# Patient Record
Sex: MALE | Race: WHITE | Employment: UNEMPLOYED | URBAN - METROPOLITAN AREA
[De-identification: names, ages, dates, MRNs, and addresses within clinical notes are randomized per-mention and may not be internally consistent; named-entity substitution may affect disease eponyms.]

---

## 2021-02-17 ENCOUNTER — TRANSFERRED RECORDS (OUTPATIENT)
Dept: HEALTH INFORMATION MANAGEMENT | Facility: CLINIC | Age: 36
End: 2021-02-17

## 2021-02-23 LAB
CREAT SERPL-MCNC: 1.03 MG/DL (ref 0.7–1.2)
GFR SERPL CREATININE-BSD FRML MDRD: >60 ML/MIN/1.73M*2
GLUCOSE SERPL-MCNC: 137 MG/DL (ref 70–99)
INR PPP: 1.2 (ref 0.9–1.1)
POTASSIUM SERPL-SCNC: 3.3 MEQ/L (ref 3.4–5.1)

## 2021-02-24 LAB
CREAT SERPL-MCNC: 0.81 MG/DL (ref 0.7–1.2)
GFR SERPL CREATININE-BSD FRML MDRD: >60 ML/MIN/1.73M*2
GLUCOSE SERPL-MCNC: 134 MG/DL (ref 70–99)
POTASSIUM SERPL-SCNC: 3.1 MEQ/L (ref 3.4–5.1)

## 2021-02-25 LAB
CREAT SERPL-MCNC: 0.79 MG/DL (ref 0.7–1.2)
GFR SERPL CREATININE-BSD FRML MDRD: >60 ML/MIN/1.73M*2
GLUCOSE SERPL-MCNC: 133 MG/DL (ref 70–99)
POTASSIUM SERPL-SCNC: 3.7 MEQ/L (ref 3.4–5.1)

## 2021-02-26 ENCOUNTER — APPOINTMENT (OUTPATIENT)
Dept: GENERAL RADIOLOGY | Facility: CLINIC | Age: 36
End: 2021-02-26
Attending: STUDENT IN AN ORGANIZED HEALTH CARE EDUCATION/TRAINING PROGRAM
Payer: MEDICAID

## 2021-02-26 ENCOUNTER — APPOINTMENT (OUTPATIENT)
Dept: CARDIOLOGY | Facility: CLINIC | Age: 36
End: 2021-02-26
Attending: INTERNAL MEDICINE
Payer: MEDICAID

## 2021-02-26 ENCOUNTER — APPOINTMENT (OUTPATIENT)
Dept: GENERAL RADIOLOGY | Facility: CLINIC | Age: 36
End: 2021-02-26
Attending: INTERNAL MEDICINE
Payer: MEDICAID

## 2021-02-26 ENCOUNTER — APPOINTMENT (OUTPATIENT)
Dept: ULTRASOUND IMAGING | Facility: CLINIC | Age: 36
End: 2021-02-26
Attending: INTERNAL MEDICINE
Payer: MEDICAID

## 2021-02-26 ENCOUNTER — HOSPITAL ENCOUNTER (INPATIENT)
Facility: CLINIC | Age: 36
LOS: 12 days | Discharge: HOME HEALTH | End: 2021-03-10
Attending: INTERNAL MEDICINE | Admitting: INTERNAL MEDICINE
Payer: MEDICAID

## 2021-02-26 DIAGNOSIS — R57.0 CARDIOGENIC SHOCK (H): Primary | ICD-10-CM

## 2021-02-26 DIAGNOSIS — I51.3 LV (LEFT VENTRICULAR) MURAL THROMBUS: ICD-10-CM

## 2021-02-26 LAB
ALBUMIN SERPL-MCNC: 2.8 G/DL (ref 3.4–5)
ALBUMIN SERPL-MCNC: 3.1 G/DL (ref 3.4–5)
ALP SERPL-CCNC: 119 U/L (ref 40–150)
ALP SERPL-CCNC: 128 U/L (ref 40–150)
ALT SERPL W P-5'-P-CCNC: 19 U/L (ref 0–70)
ALT SERPL W P-5'-P-CCNC: 21 U/L (ref 0–70)
AMPHETAMINES UR QL SCN: NEGATIVE
ANION GAP SERPL CALCULATED.3IONS-SCNC: 6 MMOL/L (ref 3–14)
ANION GAP SERPL CALCULATED.3IONS-SCNC: ABNORMAL MMOL/L (ref 3–14)
APTT PPP: 33 SEC (ref 22–37)
AST SERPL W P-5'-P-CCNC: 29 U/L (ref 0–45)
AST SERPL W P-5'-P-CCNC: 31 U/L (ref 0–45)
BARBITURATES UR QL: NEGATIVE
BASE EXCESS BLDA CALC-SCNC: 16.1 MMOL/L
BASE EXCESS BLDA CALC-SCNC: 25.1 MMOL/L
BASE EXCESS BLDA CALC-SCNC: 25.1 MMOL/L
BASE EXCESS BLDA CALC-SCNC: 25.2 MMOL/L
BASE EXCESS BLDA CALC-SCNC: 25.3 MMOL/L
BASE EXCESS BLDV CALC-SCNC: 12.1 MMOL/L
BASE EXCESS BLDV CALC-SCNC: 25.1 MMOL/L
BASE EXCESS BLDV CALC-SCNC: 25.8 MMOL/L
BENZODIAZ UR QL: NEGATIVE
BILIRUB SERPL-MCNC: 3.7 MG/DL (ref 0.2–1.3)
BILIRUB SERPL-MCNC: 3.7 MG/DL (ref 0.2–1.3)
BUN SERPL-MCNC: 23 MG/DL (ref 7–30)
BUN SERPL-MCNC: 23 MG/DL (ref 7–30)
BUN SERPL-MCNC: 24 MG/DL (ref 7–30)
BUN SERPL-MCNC: 25 MG/DL (ref 7–30)
CA-I BLD-MCNC: 4.1 MG/DL (ref 4.4–5.2)
CALCIUM SERPL-MCNC: 8.2 MG/DL (ref 8.5–10.1)
CALCIUM SERPL-MCNC: 8.4 MG/DL (ref 8.5–10.1)
CALCIUM SERPL-MCNC: 8.8 MG/DL (ref 8.5–10.1)
CALCIUM SERPL-MCNC: 9.1 MG/DL (ref 8.5–10.1)
CANNABINOIDS UR QL SCN: POSITIVE
CHLORIDE SERPL-SCNC: 74 MMOL/L (ref 94–109)
CHLORIDE SERPL-SCNC: 77 MMOL/L (ref 94–109)
CHLORIDE SERPL-SCNC: 77 MMOL/L (ref 94–109)
CHLORIDE SERPL-SCNC: 79 MMOL/L (ref 94–109)
CO2 SERPL-SCNC: 39 MMOL/L (ref 20–32)
CO2 SERPL-SCNC: >45 MMOL/L (ref 20–32)
COCAINE UR QL: NEGATIVE
CREAT SERPL-MCNC: 0.68 MG/DL (ref 0.66–1.25)
CREAT SERPL-MCNC: 0.72 MG/DL (ref 0.66–1.25)
CREAT SERPL-MCNC: 0.85 MG/DL (ref 0.66–1.25)
CREAT SERPL-MCNC: 0.91 MG/DL (ref 0.66–1.25)
ERYTHROCYTE [DISTWIDTH] IN BLOOD BY AUTOMATED COUNT: 16.1 % (ref 10–15)
ERYTHROCYTE [DISTWIDTH] IN BLOOD BY AUTOMATED COUNT: 16.2 % (ref 10–15)
ERYTHROCYTE [DISTWIDTH] IN BLOOD BY AUTOMATED COUNT: 16.3 % (ref 10–15)
ETHANOL UR QL SCN: NEGATIVE
GFR SERPL CREATININE-BSD FRML MDRD: >90 ML/MIN/{1.73_M2}
GLUCOSE BLDC GLUCOMTR-MCNC: 151 MG/DL (ref 70–99)
GLUCOSE BLDC GLUCOMTR-MCNC: 166 MG/DL (ref 70–99)
GLUCOSE SERPL-MCNC: 110 MG/DL (ref 70–99)
GLUCOSE SERPL-MCNC: 123 MG/DL (ref 70–99)
GLUCOSE SERPL-MCNC: 131 MG/DL (ref 70–99)
GLUCOSE SERPL-MCNC: 153 MG/DL (ref 70–99)
HCO3 BLD-SCNC: 41 MMOL/L (ref 21–28)
HCO3 BLD-SCNC: 51 MMOL/L (ref 21–28)
HCO3 BLD-SCNC: 52 MMOL/L (ref 21–28)
HCO3 BLDV-SCNC: 37 MMOL/L (ref 21–28)
HCO3 BLDV-SCNC: 52 MMOL/L (ref 21–28)
HCO3 BLDV-SCNC: 53 MMOL/L (ref 21–28)
HCT VFR BLD AUTO: 33.8 % (ref 40–53)
HCT VFR BLD AUTO: 34.7 % (ref 40–53)
HCT VFR BLD AUTO: 35.8 % (ref 40–53)
HGB BLD-MCNC: 11.1 G/DL (ref 13.3–17.7)
HGB BLD-MCNC: 11.5 G/DL (ref 13.3–17.7)
HGB BLD-MCNC: 11.6 G/DL (ref 13.3–17.7)
HGB BLD-MCNC: 12 G/DL (ref 13.3–17.7)
INR PPP: 1.16 (ref 0.86–1.14)
INTERPRETATION ECG - MUSE: NORMAL
LABORATORY COMMENT REPORT: NORMAL
LACTATE BLD-SCNC: 1.3 MMOL/L (ref 0.7–2)
MAGNESIUM SERPL-MCNC: 2.4 MG/DL (ref 1.6–2.3)
MAGNESIUM SERPL-MCNC: 2.4 MG/DL (ref 1.6–2.3)
MAGNESIUM SERPL-MCNC: 2.5 MG/DL (ref 1.6–2.3)
MCH RBC QN AUTO: 31.6 PG (ref 26.5–33)
MCH RBC QN AUTO: 32.5 PG (ref 26.5–33)
MCH RBC QN AUTO: 32.5 PG (ref 26.5–33)
MCHC RBC AUTO-ENTMCNC: 32.8 G/DL (ref 31.5–36.5)
MCHC RBC AUTO-ENTMCNC: 33.4 G/DL (ref 31.5–36.5)
MCHC RBC AUTO-ENTMCNC: 33.5 G/DL (ref 31.5–36.5)
MCV RBC AUTO: 96 FL (ref 78–100)
MCV RBC AUTO: 97 FL (ref 78–100)
MCV RBC AUTO: 97 FL (ref 78–100)
O2/TOTAL GAS SETTING VFR VENT: ABNORMAL %
OPIATES UR QL SCN: POSITIVE
OXYHGB MFR BLD: 69 % (ref 92–100)
OXYHGB MFR BLD: 95 % (ref 92–100)
OXYHGB MFR BLDV: 65 %
OXYHGB MFR BLDV: 73 %
OXYHGB MFR BLDV: 75 %
PCO2 BLD: 52 MM HG (ref 35–45)
PCO2 BLD: 56 MM HG (ref 35–45)
PCO2 BLD: 59 MM HG (ref 35–45)
PCO2 BLD: 59 MM HG (ref 35–45)
PCO2 BLD: 60 MM HG (ref 35–45)
PCO2 BLDV: 51 MM HG (ref 40–50)
PCO2 BLDV: 64 MM HG (ref 40–50)
PCO2 BLDV: 65 MM HG (ref 40–50)
PCP UR QL SCN: NEGATIVE
PH BLD: 7.51 PH (ref 7.35–7.45)
PH BLD: 7.55 PH (ref 7.35–7.45)
PH BLD: 7.57 PH (ref 7.35–7.45)
PH BLDV: 7.48 PH (ref 7.32–7.43)
PH BLDV: 7.51 PH (ref 7.32–7.43)
PH BLDV: 7.53 PH (ref 7.32–7.43)
PHOSPHATE SERPL-MCNC: 3.4 MG/DL (ref 2.5–4.5)
PLATELET # BLD AUTO: 147 10E9/L (ref 150–450)
PLATELET # BLD AUTO: 149 10E9/L (ref 150–450)
PLATELET # BLD AUTO: 150 10E9/L (ref 150–450)
PO2 BLD: 132 MM HG (ref 80–105)
PO2 BLD: 79 MM HG (ref 80–105)
PO2 BLD: 83 MM HG (ref 80–105)
PO2 BLD: 83 MM HG (ref 80–105)
PO2 BLD: 92 MM HG (ref 80–105)
PO2 BLDV: 36 MM HG (ref 25–47)
PO2 BLDV: 41 MM HG (ref 25–47)
PO2 BLDV: 43 MM HG (ref 25–47)
POTASSIUM SERPL-SCNC: 2.8 MMOL/L (ref 3.4–5.3)
POTASSIUM SERPL-SCNC: 3.2 MMOL/L (ref 3.4–5.3)
POTASSIUM SERPL-SCNC: 3.4 MMOL/L (ref 3.4–5.3)
POTASSIUM SERPL-SCNC: 3.4 MMOL/L (ref 3.4–5.3)
POTASSIUM SERPL-SCNC: 3.5 MMOL/L (ref 3.4–5.3)
PROT SERPL-MCNC: 5.4 G/DL (ref 6.8–8.8)
PROT SERPL-MCNC: 5.8 G/DL (ref 6.8–8.8)
RBC # BLD AUTO: 3.51 10E12/L (ref 4.4–5.9)
RBC # BLD AUTO: 3.57 10E12/L (ref 4.4–5.9)
RBC # BLD AUTO: 3.69 10E12/L (ref 4.4–5.9)
SARS-COV-2 RNA RESP QL NAA+PROBE: NEGATIVE
SODIUM SERPL-SCNC: 124 MMOL/L (ref 133–144)
SODIUM SERPL-SCNC: 126 MMOL/L (ref 133–144)
SODIUM SERPL-SCNC: 128 MMOL/L (ref 133–144)
SODIUM SERPL-SCNC: 129 MMOL/L (ref 133–144)
SPECIMEN SOURCE: NORMAL
T4 FREE SERPL-MCNC: 1.04 NG/DL (ref 0.76–1.46)
TSH SERPL DL<=0.005 MIU/L-ACNC: 14.24 MU/L (ref 0.4–4)
UFH PPP CHRO-ACNC: 0.29 IU/ML
WBC # BLD AUTO: 6.6 10E9/L (ref 4–11)
WBC # BLD AUTO: 6.8 10E9/L (ref 4–11)
WBC # BLD AUTO: 7 10E9/L (ref 4–11)

## 2021-02-26 PROCEDURE — 84443 ASSAY THYROID STIM HORMONE: CPT | Performed by: INTERNAL MEDICINE

## 2021-02-26 PROCEDURE — 250N000013 HC RX MED GY IP 250 OP 250 PS 637: Performed by: INTERNAL MEDICINE

## 2021-02-26 PROCEDURE — 999N000065 XR CHEST PORT 1 VW

## 2021-02-26 PROCEDURE — 71045 X-RAY EXAM CHEST 1 VIEW: CPT | Mod: 26 | Performed by: RADIOLOGY

## 2021-02-26 PROCEDURE — 250N000011 HC RX IP 250 OP 636: Performed by: STUDENT IN AN ORGANIZED HEALTH CARE EDUCATION/TRAINING PROGRAM

## 2021-02-26 PROCEDURE — 250N000011 HC RX IP 250 OP 636: Performed by: INTERNAL MEDICINE

## 2021-02-26 PROCEDURE — 02HP32Z INSERTION OF MONITORING DEVICE INTO PULMONARY TRUNK, PERCUTANEOUS APPROACH: ICD-10-PCS | Performed by: INTERNAL MEDICINE

## 2021-02-26 PROCEDURE — 93260 PRGRMG DEV EVAL IMPLTBL SYS: CPT | Mod: 26 | Performed by: INTERNAL MEDICINE

## 2021-02-26 PROCEDURE — 258N000003 HC RX IP 258 OP 636

## 2021-02-26 PROCEDURE — 93005 ELECTROCARDIOGRAM TRACING: CPT

## 2021-02-26 PROCEDURE — 82810 BLOOD GASES O2 SAT ONLY: CPT

## 2021-02-26 PROCEDURE — 4A023N6 MEASUREMENT OF CARDIAC SAMPLING AND PRESSURE, RIGHT HEART, PERCUTANEOUS APPROACH: ICD-10-PCS | Performed by: INTERNAL MEDICINE

## 2021-02-26 PROCEDURE — 999N000208 ECHOCARDIOGRAM COMPLETE

## 2021-02-26 PROCEDURE — 93010 ELECTROCARDIOGRAM REPORT: CPT | Performed by: INTERNAL MEDICINE

## 2021-02-26 PROCEDURE — 99292 CRITICAL CARE ADDL 30 MIN: CPT | Mod: 25 | Performed by: INTERNAL MEDICINE

## 2021-02-26 PROCEDURE — 250N000009 HC RX 250: Performed by: INTERNAL MEDICINE

## 2021-02-26 PROCEDURE — 85520 HEPARIN ASSAY: CPT | Performed by: INTERNAL MEDICINE

## 2021-02-26 PROCEDURE — 99223 1ST HOSP IP/OBS HIGH 75: CPT | Mod: GC | Performed by: INTERNAL MEDICINE

## 2021-02-26 PROCEDURE — 82803 BLOOD GASES ANY COMBINATION: CPT | Performed by: INTERNAL MEDICINE

## 2021-02-26 PROCEDURE — 85610 PROTHROMBIN TIME: CPT | Performed by: INTERNAL MEDICINE

## 2021-02-26 PROCEDURE — 85027 COMPLETE CBC AUTOMATED: CPT | Performed by: STUDENT IN AN ORGANIZED HEALTH CARE EDUCATION/TRAINING PROGRAM

## 2021-02-26 PROCEDURE — 84100 ASSAY OF PHOSPHORUS: CPT | Performed by: INTERNAL MEDICINE

## 2021-02-26 PROCEDURE — 3E043XZ INTRODUCTION OF VASOPRESSOR INTO CENTRAL VEIN, PERCUTANEOUS APPROACH: ICD-10-PCS | Performed by: INTERNAL MEDICINE

## 2021-02-26 PROCEDURE — 83735 ASSAY OF MAGNESIUM: CPT | Performed by: INTERNAL MEDICINE

## 2021-02-26 PROCEDURE — 999N001017 HC STATISTIC GLUCOSE BY METER IP

## 2021-02-26 PROCEDURE — 80307 DRUG TEST PRSMV CHEM ANLYZR: CPT | Performed by: INTERNAL MEDICINE

## 2021-02-26 PROCEDURE — 250N000011 HC RX IP 250 OP 636

## 2021-02-26 PROCEDURE — 80323 ALKALOIDS NOS: CPT | Performed by: STUDENT IN AN ORGANIZED HEALTH CARE EDUCATION/TRAINING PROGRAM

## 2021-02-26 PROCEDURE — 4A133B3 MONITORING OF ARTERIAL PRESSURE, PULMONARY, PERCUTANEOUS APPROACH: ICD-10-PCS | Performed by: INTERNAL MEDICINE

## 2021-02-26 PROCEDURE — 82330 ASSAY OF CALCIUM: CPT | Performed by: STUDENT IN AN ORGANIZED HEALTH CARE EDUCATION/TRAINING PROGRAM

## 2021-02-26 PROCEDURE — 93451 RIGHT HEART CATH: CPT | Performed by: INTERNAL MEDICINE

## 2021-02-26 PROCEDURE — 80053 COMPREHEN METABOLIC PANEL: CPT | Performed by: INTERNAL MEDICINE

## 2021-02-26 PROCEDURE — 200N000002 HC R&B ICU UMMC

## 2021-02-26 PROCEDURE — 250N000013 HC RX MED GY IP 250 OP 250 PS 637: Performed by: STUDENT IN AN ORGANIZED HEALTH CARE EDUCATION/TRAINING PROGRAM

## 2021-02-26 PROCEDURE — U0003 INFECTIOUS AGENT DETECTION BY NUCLEIC ACID (DNA OR RNA); SEVERE ACUTE RESPIRATORY SYNDROME CORONAVIRUS 2 (SARS-COV-2) (CORONAVIRUS DISEASE [COVID-19]), AMPLIFIED PROBE TECHNIQUE, MAKING USE OF HIGH THROUGHPUT TECHNOLOGIES AS DESCRIBED BY CMS-2020-01-R: HCPCS | Performed by: INTERNAL MEDICINE

## 2021-02-26 PROCEDURE — 80048 BASIC METABOLIC PNL TOTAL CA: CPT

## 2021-02-26 PROCEDURE — 71045 X-RAY EXAM CHEST 1 VIEW: CPT

## 2021-02-26 PROCEDURE — 80048 BASIC METABOLIC PNL TOTAL CA: CPT | Performed by: INTERNAL MEDICINE

## 2021-02-26 PROCEDURE — 255N000002 HC RX 255 OP 636: Performed by: INTERNAL MEDICINE

## 2021-02-26 PROCEDURE — 93975 VASCULAR STUDY: CPT

## 2021-02-26 PROCEDURE — 93975 VASCULAR STUDY: CPT | Mod: 26 | Performed by: RADIOLOGY

## 2021-02-26 PROCEDURE — 93260 PRGRMG DEV EVAL IMPLTBL SYS: CPT

## 2021-02-26 PROCEDURE — 85027 COMPLETE CBC AUTOMATED: CPT | Performed by: INTERNAL MEDICINE

## 2021-02-26 PROCEDURE — 76376 3D RENDER W/INTRP POSTPROCES: CPT | Mod: 26 | Performed by: INTERNAL MEDICINE

## 2021-02-26 PROCEDURE — 85018 HEMOGLOBIN: CPT

## 2021-02-26 PROCEDURE — 99291 CRITICAL CARE FIRST HOUR: CPT | Mod: 25 | Performed by: INTERNAL MEDICINE

## 2021-02-26 PROCEDURE — 80321 ALCOHOLS BIOMARKERS 1OR 2: CPT | Performed by: INTERNAL MEDICINE

## 2021-02-26 PROCEDURE — 250N000009 HC RX 250: Performed by: STUDENT IN AN ORGANIZED HEALTH CARE EDUCATION/TRAINING PROGRAM

## 2021-02-26 PROCEDURE — U0005 INFEC AGEN DETEC AMPLI PROBE: HCPCS | Performed by: INTERNAL MEDICINE

## 2021-02-26 PROCEDURE — 250N000013 HC RX MED GY IP 250 OP 250 PS 637

## 2021-02-26 PROCEDURE — 4A1239Z MONITORING OF CARDIAC OUTPUT, PERCUTANEOUS APPROACH: ICD-10-PCS | Performed by: INTERNAL MEDICINE

## 2021-02-26 PROCEDURE — 82805 BLOOD GASES W/O2 SATURATION: CPT | Performed by: INTERNAL MEDICINE

## 2021-02-26 PROCEDURE — 84132 ASSAY OF SERUM POTASSIUM: CPT | Performed by: INTERNAL MEDICINE

## 2021-02-26 PROCEDURE — 272N000001 HC OR GENERAL SUPPLY STERILE: Performed by: INTERNAL MEDICINE

## 2021-02-26 PROCEDURE — 83605 ASSAY OF LACTIC ACID: CPT | Performed by: INTERNAL MEDICINE

## 2021-02-26 PROCEDURE — 85730 THROMBOPLASTIN TIME PARTIAL: CPT | Performed by: STUDENT IN AN ORGANIZED HEALTH CARE EDUCATION/TRAINING PROGRAM

## 2021-02-26 PROCEDURE — 80320 DRUG SCREEN QUANTALCOHOLS: CPT | Performed by: INTERNAL MEDICINE

## 2021-02-26 PROCEDURE — 93306 TTE W/DOPPLER COMPLETE: CPT | Mod: 26 | Performed by: INTERNAL MEDICINE

## 2021-02-26 PROCEDURE — 258N000003 HC RX IP 258 OP 636: Performed by: STUDENT IN AN ORGANIZED HEALTH CARE EDUCATION/TRAINING PROGRAM

## 2021-02-26 PROCEDURE — 84439 ASSAY OF FREE THYROXINE: CPT | Performed by: INTERNAL MEDICINE

## 2021-02-26 PROCEDURE — G0463 HOSPITAL OUTPT CLINIC VISIT: HCPCS

## 2021-02-26 PROCEDURE — 82803 BLOOD GASES ANY COMBINATION: CPT

## 2021-02-26 RX ORDER — POTASSIUM CHLORIDE 29.8 MG/ML
20 INJECTION INTRAVENOUS ONCE
Status: COMPLETED | OUTPATIENT
Start: 2021-02-27 | End: 2021-02-27

## 2021-02-26 RX ORDER — POTASSIUM CHLORIDE 7.45 MG/ML
10 INJECTION INTRAVENOUS ONCE
Status: COMPLETED | OUTPATIENT
Start: 2021-02-26 | End: 2021-02-26

## 2021-02-26 RX ORDER — LIDOCAINE 40 MG/G
CREAM TOPICAL
Status: DISCONTINUED | OUTPATIENT
Start: 2021-02-26 | End: 2021-03-10 | Stop reason: HOSPADM

## 2021-02-26 RX ORDER — DOBUTAMINE HYDROCHLORIDE 200 MG/100ML
2.5-2 INJECTION INTRAVENOUS CONTINUOUS
Status: DISCONTINUED | OUTPATIENT
Start: 2021-02-26 | End: 2021-02-26 | Stop reason: DRUGHIGH

## 2021-02-26 RX ORDER — POTASSIUM CHLORIDE 20MEQ/15ML
40 LIQUID (ML) ORAL ONCE
Status: COMPLETED | OUTPATIENT
Start: 2021-02-26 | End: 2021-02-26

## 2021-02-26 RX ORDER — DOBUTAMINE HYDROCHLORIDE 200 MG/100ML
1 INJECTION INTRAVENOUS CONTINUOUS
Status: DISCONTINUED | OUTPATIENT
Start: 2021-02-26 | End: 2021-02-28

## 2021-02-26 RX ORDER — POTASSIUM CHLORIDE 20MEQ/15ML
30 LIQUID (ML) ORAL ONCE
Status: COMPLETED | OUTPATIENT
Start: 2021-02-26 | End: 2021-02-26

## 2021-02-26 RX ORDER — OXYCODONE HYDROCHLORIDE 5 MG/1
5 TABLET ORAL
Status: COMPLETED | OUTPATIENT
Start: 2021-02-26 | End: 2021-02-26

## 2021-02-26 RX ORDER — ACETAMINOPHEN 325 MG/1
650 TABLET ORAL EVERY 4 HOURS PRN
Status: DISCONTINUED | OUTPATIENT
Start: 2021-02-26 | End: 2021-03-10 | Stop reason: HOSPADM

## 2021-02-26 RX ORDER — NOREPINEPHRINE BITARTRATE 0.06 MG/ML
0.03-0.4 INJECTION, SOLUTION INTRAVENOUS CONTINUOUS
Status: DISCONTINUED | OUTPATIENT
Start: 2021-02-26 | End: 2021-03-01

## 2021-02-26 RX ORDER — MULTIPLE VITAMINS W/ MINERALS TAB 9MG-400MCG
1 TAB ORAL DAILY
Status: DISCONTINUED | OUTPATIENT
Start: 2021-02-26 | End: 2021-03-10 | Stop reason: HOSPADM

## 2021-02-26 RX ORDER — ACETAMINOPHEN 650 MG/1
650 SUPPOSITORY RECTAL EVERY 4 HOURS PRN
Status: DISCONTINUED | OUTPATIENT
Start: 2021-02-26 | End: 2021-03-10 | Stop reason: HOSPADM

## 2021-02-26 RX ORDER — NOREPINEPHRINE BITARTRATE 0.06 MG/ML
0.03-0.4 INJECTION, SOLUTION INTRAVENOUS CONTINUOUS
Status: DISCONTINUED | OUTPATIENT
Start: 2021-02-26 | End: 2021-02-26 | Stop reason: DRUGHIGH

## 2021-02-26 RX ORDER — FOLIC ACID 1 MG/1
1 TABLET ORAL DAILY
Status: DISCONTINUED | OUTPATIENT
Start: 2021-02-26 | End: 2021-03-10 | Stop reason: HOSPADM

## 2021-02-26 RX ORDER — MAGNESIUM HYDROXIDE/ALUMINUM HYDROXICE/SIMETHICONE 120; 1200; 1200 MG/30ML; MG/30ML; MG/30ML
30 SUSPENSION ORAL EVERY 4 HOURS PRN
Status: DISCONTINUED | OUTPATIENT
Start: 2021-02-26 | End: 2021-03-10 | Stop reason: HOSPADM

## 2021-02-26 RX ORDER — NITROGLYCERIN 0.4 MG/1
0.4 TABLET SUBLINGUAL EVERY 5 MIN PRN
Status: DISCONTINUED | OUTPATIENT
Start: 2021-02-26 | End: 2021-03-10 | Stop reason: HOSPADM

## 2021-02-26 RX ORDER — LANOLIN ALCOHOL/MO/W.PET/CERES
100 CREAM (GRAM) TOPICAL DAILY
Status: DISCONTINUED | OUTPATIENT
Start: 2021-02-26 | End: 2021-03-10 | Stop reason: HOSPADM

## 2021-02-26 RX ORDER — ACETAZOLAMIDE 500 MG/5ML
500 INJECTION, POWDER, LYOPHILIZED, FOR SOLUTION INTRAVENOUS EVERY 12 HOURS
Status: COMPLETED | OUTPATIENT
Start: 2021-02-26 | End: 2021-02-27

## 2021-02-26 RX ORDER — POTASSIUM CHLORIDE 750 MG/1
40 TABLET, EXTENDED RELEASE ORAL ONCE
Status: COMPLETED | OUTPATIENT
Start: 2021-02-26 | End: 2021-02-26

## 2021-02-26 RX ORDER — HEPARIN SODIUM 10000 [USP'U]/100ML
0-5000 INJECTION, SOLUTION INTRAVENOUS CONTINUOUS
Status: DISCONTINUED | OUTPATIENT
Start: 2021-02-26 | End: 2021-02-28

## 2021-02-26 RX ADMIN — MULTIPLE VITAMINS W/ MINERALS TAB 1 TABLET: TAB at 16:55

## 2021-02-26 RX ADMIN — POTASSIUM CHLORIDE 10 MEQ: 7.46 INJECTION, SOLUTION INTRAVENOUS at 11:22

## 2021-02-26 RX ADMIN — NICOTINE 7 MG/24 HR DAILY TRANSDERMAL PATCH 1 PATCH: at 13:10

## 2021-02-26 RX ADMIN — POTASSIUM CHLORIDE 20 MEQ: 29.8 INJECTION, SOLUTION INTRAVENOUS at 23:56

## 2021-02-26 RX ADMIN — HEPARIN SODIUM 950 UNITS/HR: 10000 INJECTION, SOLUTION INTRAVENOUS at 16:59

## 2021-02-26 RX ADMIN — Medication 0.04 MCG/KG/MIN: at 00:10

## 2021-02-26 RX ADMIN — OXYCODONE HYDROCHLORIDE 5 MG: 5 TABLET ORAL at 02:51

## 2021-02-26 RX ADMIN — ACETAZOLAMIDE SODIUM 500 MG: 500 INJECTION, POWDER, LYOPHILIZED, FOR SOLUTION INTRAVENOUS at 11:22

## 2021-02-26 RX ADMIN — DOBUTAMINE HYDROCHLORIDE 3 MCG/KG/MIN: 200 INJECTION INTRAVENOUS at 00:51

## 2021-02-26 RX ADMIN — Medication 2.4 UNITS/HR: at 00:12

## 2021-02-26 RX ADMIN — POTASSIUM CHLORIDE 40 MEQ: 40 SOLUTION ORAL at 02:51

## 2021-02-26 RX ADMIN — FOLIC ACID 1 MG: 1 TABLET ORAL at 16:55

## 2021-02-26 RX ADMIN — ACETAMINOPHEN 650 MG: 325 TABLET, FILM COATED ORAL at 00:43

## 2021-02-26 RX ADMIN — Medication 5 MG/HR: at 00:50

## 2021-02-26 RX ADMIN — HUMAN ALBUMIN MICROSPHERES AND PERFLUTREN 4 ML: 10; .22 INJECTION, SOLUTION INTRAVENOUS at 08:47

## 2021-02-26 RX ADMIN — POTASSIUM CHLORIDE 40 MEQ: 750 TABLET, EXTENDED RELEASE ORAL at 18:55

## 2021-02-26 RX ADMIN — Medication 2.4 UNITS/HR: at 09:30

## 2021-02-26 RX ADMIN — POTASSIUM CHLORIDE 30 MEQ: 40 SOLUTION ORAL at 20:06

## 2021-02-26 RX ADMIN — OXYCODONE HYDROCHLORIDE 5 MG: 5 TABLET ORAL at 22:54

## 2021-02-26 RX ADMIN — CALCIUM GLUCONATE 1 G: 98 INJECTION, SOLUTION INTRAVENOUS at 13:37

## 2021-02-26 RX ADMIN — THIAMINE HCL TAB 100 MG 100 MG: 100 TAB at 16:55

## 2021-02-26 RX ADMIN — ACETAMINOPHEN 650 MG: 325 TABLET, FILM COATED ORAL at 22:54

## 2021-02-26 RX ADMIN — DOBUTAMINE HYDROCHLORIDE 3 MCG/KG/MIN: 200 INJECTION INTRAVENOUS at 00:12

## 2021-02-26 ASSESSMENT — ACTIVITIES OF DAILY LIVING (ADL)
ADLS_ACUITY_SCORE: 15
ADLS_ACUITY_SCORE: 15
ADLS_ACUITY_SCORE: 17
ADLS_ACUITY_SCORE: 15
ADLS_ACUITY_SCORE: 17

## 2021-02-26 ASSESSMENT — MIFFLIN-ST. JEOR: SCORE: 1446.25

## 2021-02-26 NOTE — PROGRESS NOTES
[facility]    Cardiology Progress Note- Cards 2        Date of Admission:  2/26/2021     Assessment & Plan: HVSL   Miguel Ángel Henson is a 35 year old male with a PMHx of NICM (LVEF 15%) 2/2 to EtOH abuse, subcutaneous ICD with hx of VT and tobacco abuse who presented to OSH 2/17/20 profoundly volume overloaded with acute on chronic HFrEF, cardiogenic shock and AC requiring transfer to [facility] 2/26/2021 for consideration of advanced therapies.     New Today:  - RHC  - start heparin gtt for LV apical thrombus  - wean vasopressors as tolerated  - start acetazolamide for metabolic alkalosis  - f/u liver unit(s)/s  - neuropsych evaluation  - obtain financial clearance for advanced therapies evaluation     ---Neuro---  No acute issues      ---Cardiovascular---  # Dilated NICM (LVEF 15%, LVIDd 7.7 cm) 2/2 to EtOH   # Acute on chronic HFrEF, NYHA class IV   # Cardiogenic shock   # Medication/clinic follow-up non compliance   Presented to OSH with marked volume overload and cardiogenic shock requiring multiple pressors. Decompensated HF is in setting of medication non-compliance. On admit to [facility], CI is 2.2 (on dobutamine 3 mcg/kg/min, NE 0.06 and vaso 2.4) with CVP of 8 (small collapsible IVC) after significant volume removal (70 lbs) at OSH. Consideration of advanced therapies likely limited by current tobacco use, EtOH abuse, and medication/follow-up noncompliance.   -continue dobutamine 3 mcg/kg/min   -wean NE and vasopressin as able  -hold diuretics for now (was on lasix gtt 5 mg/hr)   -RHC today  -TTE today  -PEt in process  - neuropsych evaluation  - obtain financial approval for advanced therapies evalulation   -cirrhosis w/u as below     # LV apical thrombus  - start heparin gtt     # Subcutaneous ICD with hx of VT  -ICD check ordered      ---Respiratory---  # Acute hypoxic respiratory failure 2/2 to CHF/atelectasis, improved   -supplemental O2 as needed to maintain  SpO2>92%     # Tobacco abuse  -strict cessation encouraged      ---Renal---  # AC, resolved, 2/2 to cardiorenal   Cr 1.5 on admit to OSH, 0.9 on admit to [facility].  -trend renal fxn and UOP      # Hyponatremia, improving, 2/2 to volume overload   -trend BMP      # Primary metabolic alkalosis, suspect 2/2 to significant diuresis     -start acetazolamide  -trend BMP/VBG     # Diuretic induced hypokalemia  - replacement per unit protocol     ---GI---  # Portal hypertension   # Ascites s/p multiple paracentesis   # Hx alcohol abuse with alcoholic pancreatis/hepatitis    No cirrhosis per reports on outside imaging. Ascites felt to be 2/2 to CHF as above. As status of liver will impact advanced therapies discussion, will get formal abdominal US.  -abdominal US with dopplers   -PEth as above   -trend LFTs  MELD-Na score: 20 at 2/26/2021  9:25 AM  MELD score: 13 at 2/26/2021  9:25 AM  Calculated from:  Serum Creatinine: 0.85 mg/dL (Rounded to 1 mg/dL) at 2/26/2021  9:25 AM  Serum Sodium: 129 mmol/L at 2/26/2021  9:25 AM  Total Bilirubin: 3.7 mg/dL at 2/26/2021  3:37 AM  INR(ratio): 1.16 at 2/26/2021 12:30 AM  Age: 35 years 3 months       ---ID---  No acute issues, no growth on cx from paracentesis   -covid negative   -repeat infectious workup if unable to wean vasopressors     ---Endocrine--   No acute issues     ---Hematology---  # Normocytic Anemia/ ACD  - trend CBC daily     FEN: NPO for RHC   DVT Prophylaxis: Heparin gtt  Code Status: Full     Entered: Renzo Dorman MD 02/26/2021, 7:02 AM       The patient's care was discussed with the Attending Physician, Dr. Pratt.    Renzo Dorman MD   Cardiology Fellow  [facility] [id number]  [facility]  Please see sign in/sign out for up to date coverage information  ______________________________________________________________________    Interval History   Patient admitted overnight as transfer with cardiogenic shock 2/2 NICM and volume  overload. Overnight diuretics were held given CVP of 6 and metabolic alkalosis. Patient remains on dobutamine, norepinephrine, and vasopressin. This morning his only complaint is thirst. Denies shortness of breath. Denies chest pain or palpitations. Denies lightheadedness or dizziness. Denies n/v/d. Denies fevers,, chills, or night sweats.    Data reviewed today: I reviewed all medications, new labs and imaging results over the last 24 hours.    Physical Exam   Vital Signs: Temp: 98.7  F (37.1  C) Temp src: Oral   Pulse: 97   Resp: 16 SpO2: 96 % O2 Device: Nasal cannula Oxygen Delivery: 2 LPM  Weight: 113 lbs 8.59 oz  Constitutional: awake, alert, cooperative, no apparent distress, cachectic in appearance  Eyes: Nonicteric, pupils equal  HENT: NCAT, temporal wasting  Respiratory: Nonlabored,  Decreased air movement over the b/l bases, no crackles or wheezess  Cardiovascular: Regular tachycardia,  III/VI Holosystolic blowing murmur   Best appreciated over apex, parasternal heave, laterally displaced PMI  GI: Distended abdomen, + fluid wave, tympanitic to percussion, NT  Skin: warm and dry, no rash  Musculoskeletal: thin extremities, wwp, no peripheral edema  Neurologic: AAOx3, CN grossly intact, spontaneous movement of all extremities    Data   Recent Labs   Lab 02/26/21  0337 02/26/21  0030   WBC 7.0 6.6   HGB 11.1* 12.0*   MCV 96 97   * 150   INR  --  1.16*   * 128*   POTASSIUM 3.4 3.4   CHLORIDE 74* 77*   CO2 >45* >45*   BUN 23 23   CR 0.68 0.91   ANIONGAP Not Calculated Not Calculated   ALEKSEY 8.4* 9.1   * 131*   ALBUMIN 2.8* 3.1*   PROTTOTAL 5.4* 5.8*   BILITOTAL 3.7* 3.7*   ALKPHOS 119 128   ALT 21 19   AST 29 31     Recent Results (from the past 24 hour(s))   XR Chest Port 1 View    Narrative    EXAM: XR CHEST PORT 1 VW  2/26/2021 1:55 AM     HISTORY:  heart failure, intubated       COMPARISON:  None    FINDINGS:   Frontal radiograph of the chest. Right upper extremity PICC tip  projects over  the low SVC. Single lead subcutaneous left chest wall  implanted cardioverter defibrillator. The cardiac silhouette is  enlarged. Pulmonary venous congestion. Mild prominent interstitial  opacities. No pneumothorax. Trace bilateral pleural effusions. The  upper abdomen is unremarkable. No acute osseous abnormalities.      Impression    IMPRESSION:   1. Endotracheal tube is not visualized in the thoracic trachea.  2. Cardiomegaly with pulmonary venous congestion and mild prominent  interstitial opacities, likely representing pulmonary edema.  3. Trace bilateral pleural effusions.     Medications     DOBUTamine 3 mcg/kg/min (02/26/21 0800)     norepinephrine 0.06 mcg/kg/min (02/26/21 0800)     - MEDICATION INSTRUCTIONS -       vasopressin 2.4 Units/hr (02/26/21 0800)       sodium chloride (PF)  10 mL Intravenous Once

## 2021-02-26 NOTE — PLAN OF CARE
Major shift events: Patient arrived to  from [facility] at midnight. Belongings kept with patient. Patient at home meds kept in room and to be taken home with Teresa, patient's significant other, when she arrives today. 2 RN skin assessment completed by WILDER Garcia RN and MARY KATE Felix RN. Wound consult placed for coccyx and right dorsal foot wounds.     Patient A/Ox4 breathing comfortably on 2L NC. Denies SOB. On vaso, dobutamine, and titrating levo to maintain MAP > 65. HR ST. CVP 8. EP consulted for ICD device check. NPO since 0200 for abdominal US, but tolerates liquids and meds. Patient notes poor appetite for the past couple weeks. Hanson in place with adequate urine output. Lasix gtt stopped d/t over diuresing. No BM, + BS. Abdomen firm and distended. Mild back pain controlled with tylenol and oxy. R dorsal foot wound and coccyx wound dressed w/ foam dressings and WOC consult placed. K+ replaced x1 and trend with further AM labs. Plan for potential RHC and swan placement today per cardiology. Continue to monitor patient and update team with further changes. See flowsheet for details and assessment.

## 2021-02-26 NOTE — PROGRESS NOTES
Reviewed RN consult for suspected pressure injury from OSH from foot and coccyx.   RN may enter consult for hospital acquired pressure injury only.  Provider order required for WOC service.   WOC RN requested bedside RN obtain provider order/consult.   WOC consult completed by WOC- RN, await provider consult if services desired.    Sylwia Mirza, RN BSN CWOCN

## 2021-02-26 NOTE — PROGRESS NOTES
"CLINICAL NUTRITION SERVICES - ASSESSMENT NOTE     Nutrition Prescription    Recommendations:  Advance to regular diet as appropriate. Suspect patient will not need low-sodium diet restrictions given low appetite (can always restrict in the future if his appetite improves).     Malnutrition Status:    Severe malnutrition in the context of acute on chronic illness    Interventions ordered by Registered Dietitian (RD):  - When diet is ordered post-procedure, Ensure Enlive (350 kcal, 20 g protein ea) will be sent TID between meals.   - Multivitamin w/ minerals (1 tablet), thiamine (100 mg) and folic acid (1 mg) daily given h/o ETOH dependence and possible micronutrient deficiencies.     Future Recommendations:  If enteral nutrition indicated this admit, would recommend: Nutren 1.5 @ 50 mL/hr + Prosource (1 pkt BID) to provide 1880 kcals (36 kcal/kg/day), 104 g PRO (2 g/kg/day), 912 mL H2O, 211 g CHO and no fiber daily.       REASON FOR ASSESSMENT  Miguel Ángel Henson is a 35 year old male assessed by the dietitian for Provider Order - \"Severe Malnutrition\"  PMH includes NICM (LVEF 15%) secondary to ETOH dependence (6 beers/day multiple days of the week, reported last drink was 2 months ago), subcutaneous ICD w/ hx of VT and active tobacco use.    NUTRITION HISTORY  Patient reports following a regular diet at home. Typically consumes 3 meals/day (oatmeal or yogurt/fruit smoothie for breakfast; sandwich for lunch; protein/starch/vegetable for dinner). Patient reports usual body weight of 150-160 lbs, though he doesn't regularly weigh himself. Given severe fat and muscle loss, suspect ETOH consumption has chronically displaced nutrient-dense intake.    Patient reports reduced appetite since admission at OSH on 2/17 (x9 days). He attributes reduced appetite to fluid restrictions at OSH, stating he \"always drinks 16 oz of fluid\" with meals, and with a fluid restriction it felt \"impossible\" to eat. He feels he lost significant " "weight during OSH admission, reports feeling like \"a skeleton\" despite that he's always considered himself to have a thin physique. Patient enjoyed strawberry and chocolate Ensure Enlive at OSH.     CURRENT NUTRITION ORDERS  Diet: NPO (for procedure)   Intake: Patient has been NPO since arrival (< 24 hrs).     LABS  Labs reviewed  Na 129   Cr 0.85     MEDICATIONS  Medications reviewed  Dobutamine 3 mcg/kg/min  Norepinephrine 0.06 mcg/kg/min  Vasopressin  2.4 units/hr     ANTHROPOMETRICS  Height: 177.8 cm (5' 10\")  Most Recent Weight: 51.5 kg (113 lb 8.6 oz)    IBW: 75.5 kg  BMI: 16.29 kg/m^2 (underweight)   Weight History: Per chart, admitted at 206 lbs (93.5 kg) on 2/17 at OSH. After significan volume removal with paracentesis and diuresis, patient discharged at 135 lbs (61.5 kg) on 2/26 at OSH. Upon admission to [facility], patient weighs 113 lbs (51.5 kg) on 2/26. Overall, patient has lost 93 lbs (45% body weight) over the past 9 days. Cannot rule out fluctuation in fluid status.   Wt Readings from Last 10 Encounters:   02/26/21 51.5 kg (113 lb 8.6 oz)       Dosing Weight: 52 kg (actual weight on 2/26)    ASSESSED NUTRITION NEEDS  Estimated Energy Needs: 1550 - 1800+ kcals/day (30 - 35 kcals/kg )  Justification: Increased needs, underweight   Estimated Protein Needs: 80 - 105 grams protein/day (1.5 - 2 grams of pro/kg)  Justification: Increased needs, repletion  Estimated Fluid Needs: (1 mL/kcal)   Justification: Maintenance, pending fluid status     PHYSICAL FINDINGS  See malnutrition section below.  Ascites     MALNUTRITION  % Intake: </= 50% for >/= 5 days (severe)  % Weight Loss: Difficult to assess w/ fluctuation in fluid status   Subcutaneous Fat Loss: Facial region: Severe, Upper arm: Severe, Lower arm: Severe and Thoracic/intercostal: Severe  Muscle Loss: Facial & jaw region: Moderate, Scapular bone: Severe, Thoracic region (clavicle, acromium bone, deltoid, trapezius, pectoral): Severe, Upper arm (bicep, " tricep): Severe, Lower arm  (forearm): Severe, Dorsal hand: Severe, Upper leg (quadricep, hamstring): Severe, Patellar region: Severe and Posterior calf: Severe  Fluid Accumulation/Edema: Does not meet criteria  Malnutrition Diagnosis: Severe malnutrition in the context of acute on chronic illness    NUTRITION DIAGNOSIS  Inadequate oral intake related to reduced appetite 2/2 diet restrictions at OSH and chronic illness as evidenced by pt reports poor intake x9 days, observed severe fat and muscle wasting with BMI of 16       INTERVENTIONS  Implementation  See interventions at top of progress note     Goals  Diet adv within 1-2 days.  Patient to consume % of nutritionally adequate meal trays TID, or the equivalent with supplements/snacks.     Monitoring/Evaluation  Progress toward goals will be monitored and evaluated per protocol.    Zeynep Neville RD, LD  [id number]  Pgr: [phone]     .
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
## 2021-02-26 NOTE — Clinical Note
Secured with Catheter remains in place at 55cm.    Secured in normal fashion with sutureby Trinidad CÁRDENAS

## 2021-02-26 NOTE — H&P
Cards 2 - History and Physical       Date of Admission: 2/25/2021   Date of Service (when I saw the patient): 02/25/21    Assessment & Plan   Miguel Ángel Henson is a 35 year old male with a PMHx of NICM (LVEF 15%) 2/2 to EtOH abuse, subcutaneous ICD with hx of VT and tobacco abuse who presented to OSH 2/17/20 profoundly volume overloaded with acute on chronic HFrEF, cardiogenic shock and AC requiring transfer to [facility] 2/26/2021 for consideration of advanced therapies.     ---Neuro---  No acute issues     ---Cardiovascular---  # Dilated NICM (LVEF 15%, LVIDd 7.7 cm) 2/2 to EtOH   # Acute on chronic HFrEF, NYHA class IV   # Cardiogenic shock   # Medication/clinic follow-up non compliance   Presented to OSH with marked volume overload and cardiogenic shock requiring multiple pressors. Decompensated HF is in setting of medication non-compliance. On admit to [facility], CI is 2.2 (on dobutamine 3 mcg/kg/min, NE 0.06 and vaso 2.4) with CVP of 8 (small collapsible IVC) after significant volume removal (70 lbs) at OSH. Consideration of advanced therapies likely limited by current tobacco use, EtOH abuse, and medication/follow-up noncompliance.   -continue dobutamine 3 mcg/kg/min   -wean NE and vasopressin as able  -hold diuretics for now (was on lasix gtt 5 mg/hr)   -RHC in AM  -TTE  -PEth and UDS   -consider palliative care involvement   -cirrhosis w/u as below     # Subcutaneous ICD with hx of VT  -ICD check ordered     ---Respiratory---  # Acute hypoxic respiratory failure 2/2 to CHF, improved   -supplemental O2     # Tobacco abuse  -strict cessation encouraged     ---Renal---  # AC, resolved, 2/2 to cardiorenal   Cr 1.5 on admit to OSH, 0.9 on admit to [facility].  -trend renal fxn and UOP     # Hyponatremia, improving, 2/2 to volume overload   -trend BMP     # Primary metabolic alkalosis, suspect 2/2 to significant diuresis     -hold diuretics as above   -trend BMP/VBG     ---GI---  # Portal hypertension   # Ascites s/p multiple  paracentesis   # Hx alcohol abuse with alcoholic pancreatis/hepatitis    No cirrhosis per reports on outside imaging. Ascites felt to be 2/2 to CHF as above. As status of liver will impact advanced therapies discussion, will get formal abdominal US in AM.  -abdominal US with dopplers   -PEt as above   -trend LFTs    ---ID---  No acute issues, no growth on cx from paracentesis   -covid swab pending (negative at OSH ~9 days ago)     ---Endocrine--   No acute issues    ---Hematology---  No acute issues    FEN: NPO for RHC   DVT Prophylaxis: Enoxaparin (Lovenox) SQ  Code Status: Full     Staffed overnight with Dr. Vera. Will be formally discussed in AM with Dr. Pratt.      Chandana Franco MD  Cardiology Fellow, PGY-5  [phone]     Chief Complaint   Lower extremity swelling    History is obtained from the patient and electronic health record    History of Present Illness   Miguel Ángel Henson is a 35 year old male with a PMHx of NICM (LVEF 15%) 2/2 to EtOH abuse, subcutaneous ICD with hx of VT and tobacco abuse who presented to OSH 2/17/20 profoundly volume overloaded with acute on chronic HFrEF, cardiogenic shock and AC requiring transfer to [facility] 2/26/2021 for consideration of advanced therapies.     Per chart review of records from [facility] in [place], the patient presented to the emergency department 2/17/2021 with marked lower extremity swelling to the point where he had difficulty walking and abdominal distention from ascites and profound volume overload in the setting of acute on chronic HFrEF and cardiogenic shock.  He states he had approximately 1 month of progressive abdominal swelling and lower extremity swelling prior to presentation at [facility], this was in the setting of him not taking his medications as he ran out.  Notes also indicate he is noncompliant with follow-up.  On admission, he was found to be hyponatremic with a sodium of 109 and hyperkalemic with a potassium of 6.3 on admission and had AC  with Cr of 1.5.  TTE on 2/18/2021 showed LVEF less than 20% with severe global hypokinesis of the left ventricle and severe LV dilation along with reduced RV function.      He underwent a total of 4 paracenteses for a total volume removal of 21.4 L over 3 days.  Additionally, he required dobutamine, norepinephrine and vasopressin and was diuresed from an admission weight of 93.5 kg to a discharge weight of 61.5 kg (134 lbs) with a continuous Lasix drip (at 5 mg/hr at discharge from [facility]) and metolazone.  On discharge, sodium was 130, potassium 3.5 and creatinine 0.79.    He states his last drink of alcohol was approximately 2 months ago.  He notes his last ICD shock was approximately 1 year ago.  He denies any chest pain. Notes his dry weight is ~150 lbs. He further endorses orthopnea prior to admission and marked activity limitation in the setting of his swelling and shortness of breath.    Past Medical History    I have reviewed this patient's medical history and updated it with pertinent information if needed.   1.  Nonischemic cardiomyopathy secondary to alcohol use  -Hx of CHF dates back to Dec '18, no CAD on angiogram 12/22/18  2. Subcutaneous ICD placed for primary prevention, hx of VT   -ICD placed 9/30/19  -hx of ICD shocks in '19 in setting of hypokalemia and hypomagnesia   3.  Alcohol abuse   4. Tobacco abuse   5. Hx of alcoholic pancreatitis and alcoholic hepatitis      Past Surgical History   1.  Plastic surgery of the face for facial trauma    Prior to Admission Medications    Losartan 50 mg daily  Eplerenone 25 mg daily  Omeprazole 40 mg daily  Magnesium oxide 800 mg daily  Potassium chloride 10 mill equivalents twice daily  Torsemide 40 mg daily  Toprol-XL 25 mg daily  Multivitamin  Sildenafil citrate 100 mg as needed    Allergies   No known allergies    Social History   I have personally reviewed the social history with the patient showing that he currently resides with his fiancée, he is  unemployed last worked approximately 2 years ago as a .  He smokes marijuana on a weekly basis to help fall asleep, last use marijuana a week prior to admission. Also smokes tobacco. He states his last drink of alcohol was approximately 2 months ago, he states he would drink up to 6 beers in a day multiple times per week.      Family History   Mother has CAD with hx of PCI  No hx of heart failure or sudden cardiac death in family     Review of Systems   The 10 point Review of Systems is negative other than noted in the HPI or here.     Physical Exam   Vitals: Afebrile, heart rate 114, blood pressure 119/52 with map of 76, respiratory rate 12 satting 100% on 2 L per nasal cannula  GEN:  Cachectic. Alert, oriented x 3.  No acute distress.    HEENT: EMOI, PERRL, no scleral icterus, no nasal discharge.   CV:  Tachy, no murmurs. JVP  8  S1 + S2 noted.  LUNGS:  Decreased at bases b/l.   ABD:  Distended. Active bowel sounds, non-tender No rebound/guarding/rigidity.  EXT:  1+ LE edema, hands/feet warm to touch.    SKIN:  Dry to touch, no exanthems noted in the visualized areas.  NEURO: Alert and oriented x3, follows commands, answers questions appropriately with fluent speech, no focal deficits on limited exam.    Data   Data reviewed today:  I personally reviewed  Lab Results   Component Value Date    PH 7.57 (H) 02/26/2021    PO2 83 02/26/2021    PCO2 56 (H) 02/26/2021    HCO3 51 (HH) 02/26/2021    KYLE 25.1 02/26/2021          Lab Results   Component Value Date     02/26/2021    Lab Results   Component Value Date    CHLORIDE 77 02/26/2021    Lab Results   Component Value Date    BUN 23 02/26/2021      Lab Results   Component Value Date    POTASSIUM 3.4 02/26/2021    Lab Results   Component Value Date    CO2 >45 02/26/2021    Lab Results   Component Value Date    CR 0.91 02/26/2021        Lab Results   Component Value Date    WBC 6.6 02/26/2021    HGB 12.0 (L) 02/26/2021    HCT 35.8 (L) 02/26/2021    MCV 97  02/26/2021     02/26/2021     Lab Results   Component Value Date    AST 31 02/26/2021    ALT 19 02/26/2021    ALKPHOS 128 02/26/2021    BILITOTAL 3.7 (H) 02/26/2021     Lab Results   Component Value Date    INR 1.16 (H) 02/26/2021

## 2021-02-26 NOTE — CONSULTS
[facility] Nurse Inpatient Wound Assessment    Reason for consultation: Evaluate and treat  R foot and coccyx wounds    Assessment  R foot wounds due to Venous Ulcer due to significant edema   Status: initial assessment     R ischial tuberosity wounds due to Pressure Injury, present on admission  Pressure injury is stage 2  Status: initial assessment    Coccyx/sacrum with distinct area of minimally blanchable redness, present on admission     Treatment Plan  R foot wounds: Every other day cleanse with microKlenz and dry, cut to fit vaseline gauze and butter with medihoney (381971) to nickel thickness then apply to wound bed, medihoney side against wound, cover with mepilex. Ensure heels elevated off mattress at all times when in bed.      R ischial tuberosity wound and coccyx/sacrum redness: Every third day cleanse with microKlenz and dry, apply Cavilon no sting barrier film to skin around wound and let dry, then place mepilex. Turns q2hrs, lift foot of bed prior to lifting head of bed to reduce shearing to sacrum, chair cushion use when up to chair.       Orders Written    WO Nurse follow-up plan:weekly  Nursing to notify the Provider(s) and re-consult the WO Nurse if wound(s) deteriorates or new skin concern.    Patient History  According to provider note(s):  Miguel Ángel Henson is a 35 year old male with a PMHx of NICM (LVEF 15%) 2/2 to EtOH abuse, subcutaneous ICD with hx of VT and tobacco abuse who presented to OSH 2/17/20 profoundly volume overloaded with acute on chronic HFrEF, cardiogenic shock and AC requiring transfer to [facility] 2/26/2021 for consideration of advanced therapies.     Objective Data  Containment of urine/stool: Indwelling catheter    Active Diet Order  Orders Placed This Encounter      NPO for Medical/Clinical Reasons Except for: Meds, Ice Chips      Output:   I/O last 3 completed shifts:  In: 200.24 [P.O.:120; I.V.:80.24]  Out: 650 [Urine:650]    Risk Assessment:   Sensory Perception: 4-->no  impairment  Moisture: 4-->rarely moist  Activity: 2-->chairfast  Mobility: 3-->slightly limited  Nutrition: 2-->probably inadequate  Friction and Shear: 2-->potential problem  Hermilo Score: 17                          Labs:   Recent Labs   Lab 02/26/21  1148 02/26/21  0337 02/26/21  0030   ALBUMIN  --  2.8* 3.1*   HGB 11.6* 11.1* 12.0*   INR  --   --  1.16*   WBC 6.8 7.0 6.6       Physical Exam  Areas of skin assessed: focused R foot and buttock     Wound Location:  R dorsal foot  Date of last photo 2/26  Wound History: patient had severe LE edema which caused this wound to develop per patient         Wound Base: 100 % eschar and fibrin     Palpation of the wound bed: normal      Drainage: moderate     Description of drainage: serosanguinous     Measurements (length x width x depth, in cm) 2cm  x 3cm  x  Unknown due to fibrin     Tunneling N/A     Undermining N/A  Periwound skin: erythema- blanchable      Color: red      Temperature: normal   Odor: none  Pain: moderate, tender        Wound Location:  R ischial tuberosity and coccyx/sacrum redness   Date of last photo 2/26  Wound History: present on admission, developed at outside hospital         Wound Base: 100 % blanchable , erythema and dermis     Palpation of the wound bed: normal      Drainage: small     Description of drainage: serous     Measurements (length x width x depth, in cm) R IT: 1.2cm  x 1.5cm  x  0.1 cm  Coccyx/sacrum redness measures about 5cm x 5cm x 0cm      Tunneling N/A     Undermining N/A  Periwound skin: erythema- blanchable      Color: red      Temperature: normal   Odor: none  Pain: mild, tender    Interventions  Visual inspection and assessment completed   Wound Care Rationale Provide selective debridement (autolysis) of nonviable tissue  and Decrease bacterial load  Wound Care: done per plan of care  Supplies: at bedside  Current off-loading measures: Pillows  Current support surface: Standard  Low air loss mattress  Education provided  to: importance of repositioning and plan of care  Discussed plan of care with Patient and Nurse    Rosalind Edgar RN, CWOCN

## 2021-02-27 ENCOUNTER — ANCILLARY PROCEDURE (OUTPATIENT)
Dept: ULTRASOUND IMAGING | Facility: CLINIC | Age: 36
End: 2021-02-27
Attending: STUDENT IN AN ORGANIZED HEALTH CARE EDUCATION/TRAINING PROGRAM
Payer: MEDICAID

## 2021-02-27 ENCOUNTER — APPOINTMENT (OUTPATIENT)
Dept: GENERAL RADIOLOGY | Facility: CLINIC | Age: 36
End: 2021-02-27
Attending: STUDENT IN AN ORGANIZED HEALTH CARE EDUCATION/TRAINING PROGRAM
Payer: MEDICAID

## 2021-02-27 LAB
ABO + RH BLD: NORMAL
ABO + RH BLD: NORMAL
ALBUMIN FLD-MCNC: 1.4 G/DL
ALBUMIN SERPL-MCNC: 2.6 G/DL (ref 3.4–5)
ALP SERPL-CCNC: 131 U/L (ref 40–150)
ALT SERPL W P-5'-P-CCNC: 21 U/L (ref 0–70)
ANION GAP SERPL CALCULATED.3IONS-SCNC: 8 MMOL/L (ref 3–14)
ANION GAP SERPL CALCULATED.3IONS-SCNC: 9 MMOL/L (ref 3–14)
APPEARANCE FLD: CLEAR
AST SERPL W P-5'-P-CCNC: 35 U/L (ref 0–45)
BASE EXCESS BLDA CALC-SCNC: 7.6 MMOL/L
BASE EXCESS BLDA CALC-SCNC: 9.6 MMOL/L
BASE EXCESS BLDV CALC-SCNC: 10.7 MMOL/L
BASE EXCESS BLDV CALC-SCNC: 6.6 MMOL/L
BASE EXCESS BLDV CALC-SCNC: 7.6 MMOL/L
BASE EXCESS BLDV CALC-SCNC: 7.8 MMOL/L
BILIRUB SERPL-MCNC: 3.4 MG/DL (ref 0.2–1.3)
BLD GP AB SCN SERPL QL: NORMAL
BLOOD BANK CMNT PATIENT-IMP: NORMAL
BUN SERPL-MCNC: 26 MG/DL (ref 7–30)
BUN SERPL-MCNC: 31 MG/DL (ref 7–30)
CALCIUM SERPL-MCNC: 8 MG/DL (ref 8.5–10.1)
CALCIUM SERPL-MCNC: 8.5 MG/DL (ref 8.5–10.1)
CHLORIDE SERPL-SCNC: 80 MMOL/L (ref 94–109)
CHLORIDE SERPL-SCNC: 84 MMOL/L (ref 94–109)
CO2 SERPL-SCNC: 32 MMOL/L (ref 20–32)
CO2 SERPL-SCNC: 36 MMOL/L (ref 20–32)
COLOR FLD: YELLOW
CREAT SERPL-MCNC: 0.74 MG/DL (ref 0.66–1.25)
CREAT SERPL-MCNC: 0.76 MG/DL (ref 0.66–1.25)
ERYTHROCYTE [DISTWIDTH] IN BLOOD BY AUTOMATED COUNT: 16 % (ref 10–15)
GFR SERPL CREATININE-BSD FRML MDRD: >90 ML/MIN/{1.73_M2}
GFR SERPL CREATININE-BSD FRML MDRD: >90 ML/MIN/{1.73_M2}
GLUCOSE BLDC GLUCOMTR-MCNC: 118 MG/DL (ref 70–99)
GLUCOSE FLD-MCNC: 103 MG/DL
GLUCOSE SERPL-MCNC: 101 MG/DL (ref 70–99)
GLUCOSE SERPL-MCNC: 117 MG/DL (ref 70–99)
GRAM STN SPEC: NORMAL
HCO3 BLD-SCNC: 32 MMOL/L (ref 21–28)
HCO3 BLD-SCNC: 34 MMOL/L (ref 21–28)
HCO3 BLDV-SCNC: 32 MMOL/L (ref 21–28)
HCO3 BLDV-SCNC: 32 MMOL/L (ref 21–28)
HCO3 BLDV-SCNC: 33 MMOL/L (ref 21–28)
HCO3 BLDV-SCNC: 36 MMOL/L (ref 21–28)
HCT VFR BLD AUTO: 35 % (ref 40–53)
HGB BLD-MCNC: 11.8 G/DL (ref 13.3–17.7)
LDH FLD L TO P-CCNC: 172 U/L
LDH SERPL L TO P-CCNC: 189 U/L (ref 85–227)
LYMPHOCYTES NFR FLD MANUAL: 20 %
MAGNESIUM SERPL-MCNC: 2.1 MG/DL (ref 1.6–2.3)
MAGNESIUM SERPL-MCNC: 2.3 MG/DL (ref 1.6–2.3)
MCH RBC QN AUTO: 32.4 PG (ref 26.5–33)
MCHC RBC AUTO-ENTMCNC: 33.7 G/DL (ref 31.5–36.5)
MCV RBC AUTO: 96 FL (ref 78–100)
NEUTS BAND NFR FLD MANUAL: 21 %
O2/TOTAL GAS SETTING VFR VENT: ABNORMAL %
OTHER CELLS FLD MANUAL: 59 %
OXYHGB MFR BLDV: 60 %
OXYHGB MFR BLDV: 64 %
OXYHGB MFR BLDV: 67 %
OXYHGB MFR BLDV: 74 %
PCO2 BLD: 44 MM HG (ref 35–45)
PCO2 BLD: 47 MM HG (ref 35–45)
PCO2 BLDV: 44 MM HG (ref 40–50)
PCO2 BLDV: 49 MM HG (ref 40–50)
PCO2 BLDV: 50 MM HG (ref 40–50)
PCO2 BLDV: 52 MM HG (ref 40–50)
PH BLD: 7.47 PH (ref 7.35–7.45)
PH BLD: 7.48 PH (ref 7.35–7.45)
PH BLDV: 7.42 PH (ref 7.32–7.43)
PH BLDV: 7.43 PH (ref 7.32–7.43)
PH BLDV: 7.45 PH (ref 7.32–7.43)
PH BLDV: 7.47 PH (ref 7.32–7.43)
PHOSPHATE SERPL-MCNC: 3.6 MG/DL (ref 2.5–4.5)
PHOSPHATE SERPL-MCNC: 4.2 MG/DL (ref 2.5–4.5)
PLATELET # BLD AUTO: 155 10E9/L (ref 150–450)
PO2 BLD: 113 MM HG (ref 80–105)
PO2 BLD: 128 MM HG (ref 80–105)
PO2 BLDV: 34 MM HG (ref 25–47)
PO2 BLDV: 36 MM HG (ref 25–47)
PO2 BLDV: 38 MM HG (ref 25–47)
PO2 BLDV: 42 MM HG (ref 25–47)
POTASSIUM SERPL-SCNC: 3 MMOL/L (ref 3.4–5.3)
POTASSIUM SERPL-SCNC: 3.1 MMOL/L (ref 3.4–5.3)
POTASSIUM SERPL-SCNC: 3.5 MMOL/L (ref 3.4–5.3)
PROT FLD-MCNC: 2.4 G/DL
PROT SERPL-MCNC: 5.5 G/DL (ref 6.8–8.8)
RBC # BLD AUTO: 3.64 10E12/L (ref 4.4–5.9)
SODIUM SERPL-SCNC: 123 MMOL/L (ref 133–144)
SODIUM SERPL-SCNC: 125 MMOL/L (ref 133–144)
SPECIMEN EXP DATE BLD: NORMAL
SPECIMEN SOURCE FLD: NORMAL
SPECIMEN SOURCE: NORMAL
UFH PPP CHRO-ACNC: 0.15 IU/ML
UFH PPP CHRO-ACNC: 0.27 IU/ML
WBC # BLD AUTO: 9.6 10E9/L (ref 4–11)
WBC # FLD AUTO: 125 /UL

## 2021-02-27 PROCEDURE — 82803 BLOOD GASES ANY COMBINATION: CPT

## 2021-02-27 PROCEDURE — 250N000011 HC RX IP 250 OP 636: Performed by: STUDENT IN AN ORGANIZED HEALTH CARE EDUCATION/TRAINING PROGRAM

## 2021-02-27 PROCEDURE — 250N000013 HC RX MED GY IP 250 OP 250 PS 637: Performed by: STUDENT IN AN ORGANIZED HEALTH CARE EDUCATION/TRAINING PROGRAM

## 2021-02-27 PROCEDURE — 82805 BLOOD GASES W/O2 SATURATION: CPT | Performed by: INTERNAL MEDICINE

## 2021-02-27 PROCEDURE — P9041 ALBUMIN (HUMAN),5%, 50ML: HCPCS | Performed by: STUDENT IN AN ORGANIZED HEALTH CARE EDUCATION/TRAINING PROGRAM

## 2021-02-27 PROCEDURE — 87205 SMEAR GRAM STAIN: CPT

## 2021-02-27 PROCEDURE — 84100 ASSAY OF PHOSPHORUS: CPT | Performed by: INTERNAL MEDICINE

## 2021-02-27 PROCEDURE — 85520 HEPARIN ASSAY: CPT | Performed by: INTERNAL MEDICINE

## 2021-02-27 PROCEDURE — 71045 X-RAY EXAM CHEST 1 VIEW: CPT | Mod: 26 | Performed by: RADIOLOGY

## 2021-02-27 PROCEDURE — 83615 LACTATE (LD) (LDH) ENZYME: CPT | Performed by: INTERNAL MEDICINE

## 2021-02-27 PROCEDURE — 71045 X-RAY EXAM CHEST 1 VIEW: CPT

## 2021-02-27 PROCEDURE — 250N000011 HC RX IP 250 OP 636: Performed by: INTERNAL MEDICINE

## 2021-02-27 PROCEDURE — 87070 CULTURE OTHR SPECIMN AEROBIC: CPT

## 2021-02-27 PROCEDURE — 84100 ASSAY OF PHOSPHORUS: CPT

## 2021-02-27 PROCEDURE — 250N000011 HC RX IP 250 OP 636

## 2021-02-27 PROCEDURE — 250N000009 HC RX 250: Performed by: STUDENT IN AN ORGANIZED HEALTH CARE EDUCATION/TRAINING PROGRAM

## 2021-02-27 PROCEDURE — 83615 LACTATE (LD) (LDH) ENZYME: CPT

## 2021-02-27 PROCEDURE — 84157 ASSAY OF PROTEIN OTHER: CPT

## 2021-02-27 PROCEDURE — 88112 CYTOPATH CELL ENHANCE TECH: CPT | Mod: 26 | Performed by: PATHOLOGY

## 2021-02-27 PROCEDURE — 87015 SPECIMEN INFECT AGNT CONCNTJ: CPT

## 2021-02-27 PROCEDURE — 80053 COMPREHEN METABOLIC PANEL: CPT | Performed by: INTERNAL MEDICINE

## 2021-02-27 PROCEDURE — 258N000003 HC RX IP 258 OP 636: Performed by: STUDENT IN AN ORGANIZED HEALTH CARE EDUCATION/TRAINING PROGRAM

## 2021-02-27 PROCEDURE — 85027 COMPLETE CBC AUTOMATED: CPT | Performed by: INTERNAL MEDICINE

## 2021-02-27 PROCEDURE — 83735 ASSAY OF MAGNESIUM: CPT | Performed by: INTERNAL MEDICINE

## 2021-02-27 PROCEDURE — 89051 BODY FLUID CELL COUNT: CPT

## 2021-02-27 PROCEDURE — 88305 TISSUE EXAM BY PATHOLOGIST: CPT | Mod: TC

## 2021-02-27 PROCEDURE — 87075 CULTR BACTERIA EXCEPT BLOOD: CPT

## 2021-02-27 PROCEDURE — 88112 CYTOPATH CELL ENHANCE TECH: CPT | Mod: TC

## 2021-02-27 PROCEDURE — 49083 ABD PARACENTESIS W/IMAGING: CPT | Performed by: STUDENT IN AN ORGANIZED HEALTH CARE EDUCATION/TRAINING PROGRAM

## 2021-02-27 PROCEDURE — 250N000013 HC RX MED GY IP 250 OP 250 PS 637

## 2021-02-27 PROCEDURE — 999N001018 HC STATISTIC H-CELL BLOCK W/STAIN

## 2021-02-27 PROCEDURE — 80048 BASIC METABOLIC PNL TOTAL CA: CPT

## 2021-02-27 PROCEDURE — 86901 BLOOD TYPING SEROLOGIC RH(D): CPT | Performed by: INTERNAL MEDICINE

## 2021-02-27 PROCEDURE — 84132 ASSAY OF SERUM POTASSIUM: CPT | Performed by: INTERNAL MEDICINE

## 2021-02-27 PROCEDURE — 200N000002 HC R&B ICU UMMC

## 2021-02-27 PROCEDURE — 86900 BLOOD TYPING SEROLOGIC ABO: CPT | Performed by: INTERNAL MEDICINE

## 2021-02-27 PROCEDURE — 82042 OTHER SOURCE ALBUMIN QUAN EA: CPT

## 2021-02-27 PROCEDURE — 99291 CRITICAL CARE FIRST HOUR: CPT | Mod: GC | Performed by: INTERNAL MEDICINE

## 2021-02-27 PROCEDURE — 86850 RBC ANTIBODY SCREEN: CPT | Performed by: INTERNAL MEDICINE

## 2021-02-27 PROCEDURE — 999N001017 HC STATISTIC GLUCOSE BY METER IP

## 2021-02-27 PROCEDURE — 88305 TISSUE EXAM BY PATHOLOGIST: CPT | Mod: 26 | Performed by: PATHOLOGY

## 2021-02-27 PROCEDURE — 82945 GLUCOSE OTHER FLUID: CPT

## 2021-02-27 PROCEDURE — 999N001014 HC STATISTIC CYTO WRIGHT STAIN TC

## 2021-02-27 PROCEDURE — P9041 ALBUMIN (HUMAN),5%, 50ML: HCPCS

## 2021-02-27 PROCEDURE — 85520 HEPARIN ASSAY: CPT

## 2021-02-27 PROCEDURE — 83735 ASSAY OF MAGNESIUM: CPT

## 2021-02-27 RX ORDER — POTASSIUM CHLORIDE 20MEQ/15ML
60 LIQUID (ML) ORAL ONCE
Status: COMPLETED | OUTPATIENT
Start: 2021-02-27 | End: 2021-02-27

## 2021-02-27 RX ORDER — OMEPRAZOLE 40 MG/1
40 CAPSULE, DELAYED RELEASE ORAL DAILY
COMMUNITY

## 2021-02-27 RX ORDER — POTASSIUM CHLORIDE 29.8 MG/ML
20 INJECTION INTRAVENOUS ONCE
Status: COMPLETED | OUTPATIENT
Start: 2021-02-27 | End: 2021-02-27

## 2021-02-27 RX ORDER — EPLERENONE 25 MG/1
25 TABLET, FILM COATED ORAL DAILY
Status: ON HOLD | COMMUNITY
End: 2021-03-10

## 2021-02-27 RX ORDER — ONDANSETRON 4 MG/1
4 TABLET, ORALLY DISINTEGRATING ORAL
Status: COMPLETED | OUTPATIENT
Start: 2021-02-27 | End: 2021-02-27

## 2021-02-27 RX ORDER — POTASSIUM CHLORIDE 750 MG/1
10 TABLET, EXTENDED RELEASE ORAL 2 TIMES DAILY
Status: ON HOLD | COMMUNITY
End: 2021-03-10

## 2021-02-27 RX ORDER — POTASSIUM CHLORIDE 29.8 MG/ML
20 INJECTION INTRAVENOUS
Status: COMPLETED | OUTPATIENT
Start: 2021-02-27 | End: 2021-02-27

## 2021-02-27 RX ORDER — SILDENAFIL 100 MG/1
100 TABLET, FILM COATED ORAL DAILY PRN
Status: ON HOLD | COMMUNITY
End: 2021-03-10

## 2021-02-27 RX ORDER — MULTIPLE VITAMINS W/ MINERALS TAB 9MG-400MCG
1 TAB ORAL DAILY
COMMUNITY

## 2021-02-27 RX ORDER — METOPROLOL SUCCINATE 25 MG/1
25 TABLET, EXTENDED RELEASE ORAL DAILY
Status: ON HOLD | COMMUNITY
End: 2021-03-10

## 2021-02-27 RX ORDER — ALBUMIN, HUMAN INJ 5% 5 %
12.5 SOLUTION INTRAVENOUS ONCE
Status: COMPLETED | OUTPATIENT
Start: 2021-02-27 | End: 2021-02-27

## 2021-02-27 RX ORDER — ONDANSETRON 2 MG/ML
4 INJECTION INTRAMUSCULAR; INTRAVENOUS
Status: COMPLETED | OUTPATIENT
Start: 2021-02-27 | End: 2021-02-27

## 2021-02-27 RX ORDER — TORSEMIDE 20 MG/1
40 TABLET ORAL DAILY
Status: ON HOLD | COMMUNITY
End: 2021-03-10

## 2021-02-27 RX ORDER — LIDOCAINE 4 G/G
1 PATCH TOPICAL
Status: DISCONTINUED | OUTPATIENT
Start: 2021-02-27 | End: 2021-03-10 | Stop reason: HOSPADM

## 2021-02-27 RX ORDER — LIDOCAINE 40 MG/G
CREAM TOPICAL
Status: DISCONTINUED | OUTPATIENT
Start: 2021-02-27 | End: 2021-02-27

## 2021-02-27 RX ORDER — LOSARTAN POTASSIUM 50 MG/1
50 TABLET ORAL DAILY
Status: ON HOLD | COMMUNITY
End: 2021-03-10

## 2021-02-27 RX ORDER — POTASSIUM CHLORIDE 29.8 MG/ML
20 INJECTION INTRAVENOUS ONCE
Status: DISCONTINUED | OUTPATIENT
Start: 2021-02-27 | End: 2021-02-27 | Stop reason: DRUGHIGH

## 2021-02-27 RX ORDER — MAGNESIUM OXIDE 400 MG/1
800 TABLET ORAL DAILY
COMMUNITY

## 2021-02-27 RX ADMIN — POTASSIUM CHLORIDE 20 MEQ: 29.8 INJECTION, SOLUTION INTRAVENOUS at 21:21

## 2021-02-27 RX ADMIN — POTASSIUM CHLORIDE 20 MEQ: 29.8 INJECTION, SOLUTION INTRAVENOUS at 03:05

## 2021-02-27 RX ADMIN — NICOTINE 7 MG/24 HR DAILY TRANSDERMAL PATCH 1 PATCH: at 08:20

## 2021-02-27 RX ADMIN — POTASSIUM CHLORIDE 60 MEQ: 40 SOLUTION ORAL at 10:51

## 2021-02-27 RX ADMIN — LIDOCAINE 1 PATCH: 560 PATCH PERCUTANEOUS; TOPICAL; TRANSDERMAL at 11:05

## 2021-02-27 RX ADMIN — HEPARIN SODIUM 1250 UNITS/HR: 10000 INJECTION, SOLUTION INTRAVENOUS at 07:58

## 2021-02-27 RX ADMIN — POTASSIUM CHLORIDE 20 MEQ: 29.8 INJECTION, SOLUTION INTRAVENOUS at 11:03

## 2021-02-27 RX ADMIN — POTASSIUM CHLORIDE 20 MEQ: 29.8 INJECTION, SOLUTION INTRAVENOUS at 20:06

## 2021-02-27 RX ADMIN — ONDANSETRON 4 MG: 2 INJECTION INTRAMUSCULAR; INTRAVENOUS at 11:14

## 2021-02-27 RX ADMIN — POTASSIUM CHLORIDE 20 MEQ: 29.8 INJECTION, SOLUTION INTRAVENOUS at 05:13

## 2021-02-27 RX ADMIN — ACETAMINOPHEN 650 MG: 325 TABLET, FILM COATED ORAL at 23:48

## 2021-02-27 RX ADMIN — THIAMINE HCL TAB 100 MG 100 MG: 100 TAB at 08:20

## 2021-02-27 RX ADMIN — ALBUMIN HUMAN 12.5 G: 0.05 INJECTION, SOLUTION INTRAVENOUS at 17:21

## 2021-02-27 RX ADMIN — LIDOCAINE: 40 CREAM TOPICAL at 01:58

## 2021-02-27 RX ADMIN — ALBUMIN HUMAN 12.5 G: 0.05 INJECTION, SOLUTION INTRAVENOUS at 10:51

## 2021-02-27 RX ADMIN — POTASSIUM CHLORIDE 20 MEQ: 29.8 INJECTION, SOLUTION INTRAVENOUS at 04:07

## 2021-02-27 RX ADMIN — POTASSIUM CHLORIDE 20 MEQ: 29.8 INJECTION, SOLUTION INTRAVENOUS at 16:55

## 2021-02-27 RX ADMIN — Medication 1 UNITS/HR: at 06:06

## 2021-02-27 RX ADMIN — ONDANSETRON 4 MG: 4 TABLET, ORALLY DISINTEGRATING ORAL at 19:35

## 2021-02-27 RX ADMIN — FOLIC ACID 1 MG: 1 TABLET ORAL at 08:20

## 2021-02-27 RX ADMIN — ACETAZOLAMIDE SODIUM 500 MG: 500 INJECTION, POWDER, LYOPHILIZED, FOR SOLUTION INTRAVENOUS at 00:37

## 2021-02-27 RX ADMIN — MULTIPLE VITAMINS W/ MINERALS TAB 1 TABLET: TAB at 08:20

## 2021-02-27 RX ADMIN — ALBUMIN HUMAN 12.5 G: 0.05 INJECTION, SOLUTION INTRAVENOUS at 12:45

## 2021-02-27 RX ADMIN — POTASSIUM CHLORIDE 60 MEQ: 40 SOLUTION ORAL at 16:38

## 2021-02-27 ASSESSMENT — MIFFLIN-ST. JEOR: SCORE: 1529.25

## 2021-02-27 ASSESSMENT — ACTIVITIES OF DAILY LIVING (ADL)
ADLS_ACUITY_SCORE: 17

## 2021-02-27 NOTE — PROGRESS NOTES
[facility]    Cardiology Progress Note- Cards 2        Date of Admission:  2021     Assessment & Plan: HVSL   Miguel Ángel Henson is a 35 year old male with a PMHx of NICM (LVEF 15%) 2 to EtOH abuse, subcutaneous ICD with hx of VT and tobacco abuse who presented to OSH 20 profoundly volume overloaded with acute on chronic HFrEF, cardiogenic shock and AC requiring transfer to [facility] 2021 for consideration of advanced therapies.      New Today:  - paracentesis per procedure team  - albumin 12.5mg pre- and post- paracentesis  - IR for transjugular biopsy, will likely be scheduled for Monday  - trend hemodynamics, wean pressors, may wean dobutamine pending post-paracentesis hemodynamics     ---Neuro---  No acute issues      ---Cardiovascular---  # Dilated NICM (LVEF 15%, LVIDd 7.7 cm)  to EtOH   # Acute on chronic HFrEF, NYHA class IV   # Cardiogenic shock   # Medication/clinic follow-up non compliance   Presented to OSH with marked volume overload and cardiogenic shock requiring multiple pressors. Decompensated HF is in setting of medication non-compliance. On admit to [facility], CI is 2.2 (on dobutamine 3 mcg/kg/min, NE 0.06 and vaso 2.4) with CVP of 8 (small collapsible IVC) after significant volume removal (70 lbs) at OSH. Consideration of advanced therapies likely limited by current tobacco use, EtOH abuse, and medication/follow-up noncompliance.   TTE : LVEF 5-10%, LVIDd 8.97 cm, LV apical thrombus, mod-sev MR, mod-sev TR Mild RV dysfunction,  RH : RA 6, RV 31/6, PA 31/16 (22), PCWP 14, Luis 5.2/3.2, TD 5.4/3.3, SVR 1060, PVR 1.5 on dobutamine 3, vaso 2.4, norepi 0.02  [place] Numbers : CVP 3, PA 28/17 (24), PCWP 12, SvO2 60, CI 2.05, SVR 1634 on vaso 1, norepi 0.06,  2  -dobutamine 2 mcg/kg/min; may wean off today   -wean NE and vasopressin as able  -hold diuretics given low CVP  -PEth in process  - neuropsych evaluation  - obtain  financial approval for advanced therapies evalulation   - cirrhosis w/u as below      # LV apical thrombus  - heparin gtt     # Subcutaneous ICD with hx of VT  -ICD check unremarkable     ---Respiratory---  # Acute hypoxic respiratory failure 2/2 to CHF/atelectasis, improved   -supplemental O2 as needed to maintain SpO2>92%     # Tobacco abuse  -strict cessation encouraged   - nicotine patch     ---Renal---  # AC, resolved, 2/2 to cardiorenal   Cr 1.5 on admit to OSH, 0.9 on admit to [facility].  -trend renal fxn and UOP      # Hyponatremia, improving, 2/2 to volume overload   -trend BMP      # Primary metabolic alkalosis, suspect 2/2 to significant diuresis     -trend BMP/VBG      # Diuretic induced hypokalemia  - replacement per unit protocol     ---GI---  # Portal hypertension   # Ascites s/p multiple paracentesis   # Hx alcohol abuse with alcoholic pancreatis/hepatitis    No cirrhosis per reports on outside imaging. Ascites felt to be 2/2 to CHF as above. As status of liver will impact advanced therapies discussion, will get formal abdominal US.  -PEth as above   -trend LFTs  - appreciate hepatology consultation: obtain transjugular liver biopsy  MELD-Na score: 24 at 2/27/2021  2:18 AM  MELD score: 13 at 2/27/2021  2:18 AM  Calculated from:  Serum Creatinine: 0.76 mg/dL (Rounded to 1 mg/dL) at 2/27/2021  2:18 AM  Serum Sodium: 123 mmol/L (Rounded to 125 mmol/L) at 2/27/2021  2:18 AM  Total Bilirubin: 3.4 mg/dL at 2/27/2021  2:18 AM  INR(ratio): 1.16 at 2/26/2021 12:30 AM  Age: 35 years 3 months    ---ID---  No acute issues, no growth on cx from paracentesis   -covid negative   -repeat infectious workup if unable to wean vasopressors     ---Endocrine--   No acute issues     ---Hematology---  # Normocytic Anemia/ ACD  - trend CBC daily     FEN: 2g Na, 1800cc fluid restricted   DVT Prophylaxis: Heparin gtt  Code Status: Full       Entered: Renzo Dorman MD 02/27/2021, 6:48 AM       The patient's care was discussed with  the Attending Physician, Dr. Pratt.    Renzo Dorman MD   Cardiology Fellow  [facility]  Please see sign in/sign out for up to date coverage information  ______________________________________________________________________    Interval History   RHC yesterday with normal filling pressures and normal cardiac output on dobutamine and vasopressors. Alkalosis improving gradually with acetazolamide and cessation of loop diuretics. Gradually weaning pressors. Subjectively having muscle cramps. Denies shortness of breath or chest pain. Denies palpitations or lightheadedness. Having nausea with movement.    Data reviewed today: I reviewed all medications, new labs and imaging results over the last 24 hours.    Physical Exam   Vital Signs: Temp: 97.6  F (36.4  C) Temp src: Oral   Pulse: 102   Resp: 16 SpO2: 100 % O2 Device: Nasal cannula Oxygen Delivery: 2 LPM  Weight: 114 lbs 3.17 oz  Constitutional: awake, alert, cooperative, no apparent distress, cachectic in appearance  Eyes: Nonicteric, pupils equal  HENT: NCAT, temporal wasting  Respiratory: Nonlabored,  Decreased air movement over the b/l bases, no crackles or wheezess  Cardiovascular: Regular tachycardia,  III/VI Holosystolic blowing murmur  best appreciated over apex, parasternal heave, laterally displaced PMI  GI: Distended abdomen, + fluid wave, NT  Skin: warm and dry, no rash  Musculoskeletal: thin extremities, wwp, no peripheral edema  Neurologic: AAOx3, CN grossly intact, spontaneous movement of all extremities    Data   Recent Labs   Lab 02/27/21  0218 02/26/21  2257 02/26/21  1740 02/26/21  1518 02/26/21  1148 02/26/21  0925 02/26/21  0337 02/26/21  0030   WBC 9.6  --   --   --  6.8  --  7.0 6.6   HGB 11.8*  --   --  11.5* 11.6*  --  11.1* 12.0*   MCV 96  --   --   --  97  --  96 97     --   --   --  149*  --  147* 150   INR  --   --   --   --   --   --   --  1.16*   *  --  124*  --   --  129* 126*  128*   POTASSIUM 3.0* 3.2* 2.8*  --   --  3.5 3.4 3.4   CHLORIDE 80*  --  79*  --   --  77* 74* 77*   CO2 36*  --  39*  --   --  >45* >45* >45*   BUN 26  --  24  --   --  25 23 23   CR 0.76  --  0.72  --   --  0.85 0.68 0.91   ANIONGAP 8  --  6  --   --  Not Calculated Not Calculated Not Calculated   ALEKSEY 8.5  --  8.2*  --   --  8.8 8.4* 9.1   *  --  110*  --   --  123* 153* 131*   ALBUMIN 2.6*  --   --   --   --   --  2.8* 3.1*   PROTTOTAL 5.5*  --   --   --   --   --  5.4* 5.8*   BILITOTAL 3.4*  --   --   --   --   --  3.7* 3.7*   ALKPHOS 131  --   --   --   --   --  119 128   ALT 21  --   --   --   --   --  21 19   AST 35  --   --   --   --   --  29 31     Recent Results (from the past 24 hour(s))   Echo Complete    Narrative    [id number]  [id number]  [id number]  [id number]^MASSIEL^RD^ZHENG           [facility],[place]  Echocardiography Laboratory  [address] [place] [id number]     Name: JUANY HICKS  MRN: [id number]  : 1985  Study Date: 2021 07:59 AM  Age: 35 yrs  Gender: Male  Patient Location: [facility]  Reason For Study: Heart Failure  Ordering Physician: RD RANKIN  Performed By: Ruddy Carson     BSA: 1.6 m2  Height: 70 in  Weight: 113 lb  HR: 100  BP: 107/62 mmHg  _____________________________________________________________________________  __        Procedure  Complete Portable Echo Adult. Optison (NDC #5779-8209-71) given intravenously.  Patient was given 4 ml mixture of 3 ml Optison and 6 ml saline. 5 ml wasted.  _____________________________________________________________________________  __        Interpretation Summary  Severely (EF 5-10%) reduced left ventricular function is present.  LVEF 11% based on volumetric 3D analysis.  Severe diffuse hypokinesis is present. Severe left ventricular dilation is  present. LVIDd 8.97 cm.  LV apical thrombus noted.  Global right ventricular function is mildly reduced.  Severe biatrial enlargement is  present.  Moderate to severe mitral insufficiency is present.  Moderate to severe tricuspid insufficiency is present.  Right ventricular systolic pressure is 46mmHg above the right atrial pressure.  IVC diameter >2.1 cm collapsing <50% with sniff suggests a high RA pressure  estimated at 15 mmHg or greater.  No pericardial effusion is present.     There is no prior study for direct comparison.           _____________________________________________________________________________  __        Left Ventricle  LVEF 11% based on volumetric 3D analysis. Severe left ventricular dilation is  present. LVIDd 8.97 cm. Severely (EF 5-10%) reduced left ventricular function  is present. Diastolic function not assessed due to tachycardia. Severe diffuse  hypokinesis is present. There is no thrombus.     Right Ventricle  Moderate right ventricular dilation is present. Global right ventricular  function is mildly reduced.     Atria  Severe biatrial enlargement is present.     Mitral Valve  Moderate to severe mitral insufficiency is present. The cause of the mitral  insufficiency appears to be altered left ventricular size and geometry.        Aortic Valve  Aortic valve is normal in structure and function. The aortic valve is  tricuspid.     Tricuspid Valve  Moderate to severe tricuspid insufficiency is present. Right ventricular  systolic pressure is 46mmHg above the right atrial pressure.     Pulmonic Valve  The pulmonic valve is normal. On Doppler interrogation, there is no  significant stenosis or regurgitation.     Vessels  The aorta root is normal. The pulmonary artery and bifurcation cannot be  assessed. Sinuses of Valsalva 3.0 cm. Ascending aorta 2.8 cm. Dilation of the  inferior vena cava is present with abnormal respiratory variation in diameter.  IVC diameter >2.1 cm collapsing <50% with sniff suggests a high RA pressure  estimated at 15 mmHg or greater.     Pericardium  No pericardial effusion is present.        Compared  to Previous Study  There is no prior study for direct comparison.     Attestation  I have personally viewed the imaging and agree with the interpretation and  report as documented by the fellow, Christina Rodriguez, and/or edited by me.  _____________________________________________________________________________  __     MMode/2D Measurements & Calculations  IVSd: 0.74 cm  LVIDd: 9.0 cm  LVIDs: 8.5 cm  LVPWd: 1.1 cm  FS: 5.4 %  LV mass(C)d: 463.4 grams  LV mass(C)dI: 283.0 grams/m2  Ao root diam: 3.0 cm  asc Aorta Diam: 2.8 cm  LVOT diam: 2.3 cm  LVOT area: 4.2 cm2  LA Volume (BP): 185.0 ml     LA Volume Index (BP): 112.8 ml/m2  RWT: 0.25        Doppler Measurements & Calculations  MV E max monica: 114.0 cm/sec  MV A max monica: 23.3 cm/sec  MV E/A: 4.9  MV dec slope: 701.0 cm/sec2  PA acc time: 0.07 sec  TR max monica: 300.0 cm/sec  TR max P.7 mmHg     _____________________________________________________________________________  __           Report approved by: Katie Ba 2021 10:32 AM      US Abdomen Limited w Abd/Pelv Duplex Complete Port    Narrative    EXAMINATION: US ABDOMEN LIMITED WITH DOPPLER COMPLETE PORTABLE,  2021 9:55 AM     COMPARISON: None.    HISTORY: Alcohol abuse, profound ascites in the setting of advanced  CHF. Assess for liver cirrhosis and portal vein thrombosis    TECHNIQUE: The abdomen was scanned in standard fashion with  specialized ultrasound transducer(s) using both gray-scale, color  Doppler, and spectral flow techniques.    Findings:    Liver: Coarsened and heterogeneous hepatic echotexture with linear  echogenic bands extending from the hilum. No evidence of a focal  hepatic mass. No overt capsular nodularity.    Extrahepatic portal vein flow is antegrade, measuring 34 cm/sec.  Right portal vein flow is antegrade, measuring 24 cm/sec.  Left portal vein flow is antegrade, measuring 25 cm/sec.    Flowing echogenic foci demonstrated throughout the portal veins (best  demonstrated  on cine clips)    Flow in the hepatic artery is towards the liver and:  63 cm/sec peak systolic  0.79 resistive index.     The right and left hepatic arteries are patent. The splenic vein is  obscured.  The left, middle, and right hepatic veins are patent with  flow towards the IVC. The IVC is patent with flow towards the heart.    The visualized aorta is not dilated.    Gallbladder: Large amount of layering biliary sludge. Borderline wall  thickening measuring 4 mm in diameter. No pericholecystic fluid.    Bile Ducts: Both the intra- and extrahepatic biliary system are of  normal caliber. Common bile duct is not visualized.    Pancreas: Not well visualized.    Right kidney: Normal echotexture, without mass or hydronephrosis,  measuring 10.4 cm.    Fluid: Moderate to large amount of abdominal ascites demonstrated  throughout the four abdominal quadrants.      Impression    Impression:   1a. Increased parenchymal echogenicity along the portal system with  flowing echogenic foci within the portal veins, favored to be  secondary to recent contrast-enhanced echocardiogram. Alternatively,  these findings can be seen with pneumatosis/portal venous gas.  1b. No appreciable focal hepatic lesion, however, smaller lesions may  be obscured by previously mentioned artifactual echogenicity.  1c. Patent hepatic vasculature.  2. Large volume ascites.  3. Biliary sludge.    I have personally reviewed the examination and initial interpretation  and I agree with the findings.    NEMO CASTILLO, DO   Cardiac Device Check - Inpatient   Result Value    Implantable Pulse Generator  [employer]    Implantable Pulse Generator Model A219 [place] MRI S-ICD    Implantable Pulse Generator Serial Number [id number]    Type Interrogation Session In Clinic    Clinic Name [facility]    Implantable Pulse Generator Type Defibrillator    Implantable Pulse Generator Implant Date 20190930    Implantable Lead  " [employer]    Implantable Lead Model [address] [place] S-ICD Electrode    Implantable Lead Serial Number [id number]    Implantable Lead Implant Date 20190930    Implantable Lead Polarity Type Tripolar Lead    Implantable Lead Special Function subcutaneous lead    Implantable Lead Location Unknown    Zone Setting Type Category VF    Zone Setting Vendor Type Category VF    Zone Setting Detection Interval 240    Zone Setting Type Category VT    Zone Setting Vendor Type Category VT    Zone Setting Detection Interval 285.71    Zone Setting Type Category VT    Zone Setting Vendor Type Category VT-1    Zone Setting Type Category VT    Capacitor Charge Type Shock    Capacitor Charge Type Reformation    Capacitor Charge Type FULL_ENERGY    Date Time Interrogation Session [id number]    Narrative    Preliminary Device Interrogation Results.  Final physician signed paceart report to be scanned and attached.    Patient seen in [address] [place] [address] for evaluation and iterative programming of [employer] subcutaneous ICD per MD orders.  Patient was admitted for cardiogenic shock.  Normal ICD function.  No episodes recorded.  Presenting EGM = ST @ 110 bpm.  Remaining battery life to JULIA = 56%.  Electrode impedance status = ok.  Patient reports that he is feeling \"better.\"  Plan for continued inpatient treatment. Patient follows in [place] and will follow up post discharge. Patient states that he has been notified of Electrode Lumen advisory by his home device clinic.  AVI Rodríguez RN.    SQ ICD   Cardiac Catheterization    Narrative      Right sided filling pressures are normal.    Normal PA pressures.    Left sided filling pressures are normal.    Normal cardiac output level. On dobutamine.    Venous sheath sutured in and PA cath left at 55 cm.    Hemodynamic data has been modified in Epic per physician review.      XR Chest Port 1 View    Narrative    Portable chest 2/26/21 at 1631 hours    INDICATION: There are 5 pulmonary " artery catheter location    COMPARISON: Earlier today at 0101 hours    Findings: Heart is enlarged. Right IJ [place] catheter tip is in the  distal right pulmonary artery. No pneumothorax. Right upper extremity  PICC line is present with tip in the SVC. Epicardial pacer appears  unchanged. Haziness in the lower lung fields which may indicate mild  atelectasis or edema.      Impression    IMPRESSION: Right IJ [place] catheter tip in the distal right main  and branch pulmonary artery. Epicardial pacer. Haziness in the lower  lung fields bilaterally which may indicate atelectasis or edema.  Cardiomegaly.    TIFFANIE GUALLPA MD     Medications     DOBUTamine 2 mcg/kg/min (02/27/21 0700)     heparin 1,250 Units/hr (02/27/21 0700)     norepinephrine 0.06 mcg/kg/min (02/27/21 0700)     - MEDICATION INSTRUCTIONS -       vasopressin 1 Units/hr (02/27/21 0700)       folic acid  1 mg Oral Daily     multivitamin w/minerals  1 tablet Oral Daily     nicotine  1 patch Transdermal Daily     nicotine   Transdermal Q8H     potassium chloride  20 mEq Intravenous Q2H     sodium chloride (PF)  10 mL Intravenous Once     vitamin B1  100 mg Oral Daily

## 2021-02-27 NOTE — CONSULTS
Consult and Procedure Service - Procedure Note    Attending:Chica Perry DO    Resident: n/a  Procedure: Diagnostic and therapeutic paracentesis  Indication: heart failure, cardiogenic shock, per cardiology draining ascites will help heart function  Risk Assessment: high, heparin drip, held 2 hours prior to procedure  Pre-procedure diagnosis: ascites  Post-procedure diagnosis: same    The risks and benefits of the procedure were explained to the patient who expressed understanding and opted to proceed.  Consent was obtained and placed in the chart.  A time out was performed.  An area of ascites was located and marked using ultrasound guidance in the left lower quadrant; the area was prepped and draped in the usual sterile fashion.  5 ml of 1% lidocaine was instilled and ascites located.  The [facility] paracentesis catheter and needle were inserted under real-time guidance until ascites obtained then the needle removed and the catheter advanced.  The apparatus was connected to vacuum bottles and a total of 2000 ml of yellow colored fluid removed.   A specimen was sent for analysis. The catheter was withdrawn and the area dressed.  Patient tolerated the procedure well with no immediate complications.  Please contact the Consult and Procedure Service if any complications or concerns arise.     DOS:  02/27/21

## 2021-02-27 NOTE — PHARMACY-ADMISSION MEDICATION HISTORY
Admission medication history interview status for the 2/26/2021 admission is complete. See Epic admission navigator for allergy information, pharmacy, prior to admission medications and immunization status.     Medication history interview sources:  Patient arrived from OSH with PTA medications (now sent home with patient's significant other)    Changes made to PTA medication list (reason)  Added: All medications were added  Deleted: N/A  Changed: N/A    Additional medication history information (including reliability of information, actions taken by pharmacist): None      Prior to Admission medications    Medication Sig Last Dose Taking? Auth Provider   eplerenone (INSPRA) 25 MG tablet Take 25 mg by mouth daily Past Week at Unknown time Yes Unknown, Entered By History   losartan (COZAAR) 50 MG tablet Take 50 mg by mouth daily Past Week at Unknown time Yes Unknown, Entered By History   magnesium oxide (MAG-OX) 400 MG tablet Take 800 mg by mouth daily Past Week at Unknown time Yes Unknown, Entered By History   metoprolol succinate ER (TOPROL-XL) 25 MG 24 hr tablet Take 25 mg by mouth daily Past Week at Unknown time Yes Unknown, Entered By History   multivitamin w/minerals (MULTI-VITAMIN) tablet Take 1 tablet by mouth daily Past Week at Unknown time Yes Unknown, Entered By History   omeprazole (PRILOSEC) 40 MG DR capsule Take 40 mg by mouth daily Past Week at Unknown time Yes Unknown, Entered By History   potassium chloride ER (KLOR-CON M) 10 MEQ CR tablet Take 10 mEq by mouth 2 times daily Past Week at Unknown time Yes Unknown, Entered By History   sildenafil (VIAGRA) 100 MG tablet Take 100 mg by mouth daily as needed  Yes Unknown, Entered By History   torsemide (DEMADEX) 20 MG tablet Take 40 mg by mouth daily Past Week at Unknown time Yes Unknown, Entered By History     Medication history completed by:   Kaylee Mars, PharmD  February 27, 2021

## 2021-02-27 NOTE — PLAN OF CARE
Major Shift Events:    - Eaton placed  - Monitor electrolytes.  - Diomax given to improve alkalosis.  - Weaned off levo and vaso  Plan:   - Paracentesis tomorrow.   For vital signs and complete assessments, please see documentation flowsheets.

## 2021-02-27 NOTE — PLAN OF CARE
Changes this shift: Replacing K throughout shift, recheck this AM at 0715. Per Dr. Joan watts to give Diamox despite low K. Dobutamine gtt decreased to 2 per cards. MICHELLE Q6H. One time oxycodone and prn tylenol given for generalized pain. Lidocaine cream applied to chest. Heparin at 1100, 10A recheck at 0600. Levo and Vaso gtts restarted to maintain MAP>65, currently levo at 0.06 and vaso at 1. Voiding adequate amounts into urinal.     Plan: Paracentesis today. Possible removal of swan. Continue to monitor and notify MD with pertinent changes.

## 2021-02-27 NOTE — CONSULTS
"    Interventional Radiology Consult Service Note    Patient is a 35 yM with a relevant PMH of NICM (LVEF 15%) 2/2 alcohol abuse, ICD placement, who presented to OSH on 2/17/2021 with volume overload and acute HFrEF exacerbation/cardiogenic shock requiring pressors and AC. IR was consulted for transjugular liver biopsy to better elucidate the etiology of patient's developing cirrhosis as to whether it is cardiogenic in etiology or secondary to alcohol abuse and as to whether patient would be candidate for LVAD and/or heart transplant.       Platelets WNL for procedure. Currently on therapeutic heparin drip for LV thrombus. Will need to be be made NPO at midnight on 2/28 with current plan for procedure on 2/29.     Consent will be done prior to procedure.     Please contact the IR charge RN at [phone] for estimated time of procedure.     Case discussed with Dr Lacy and Dr Dorman, cardiology fellow.       Vitals:   Pulse 94   Temp 99  F (37.2  C) (Oral)   Resp 16   Ht 1.778 m (5' 10\")   Wt 51.8 kg (114 lb 3.2 oz)   SpO2 98%   BMI 16.39 kg/m      Pertinent Labs:     Lab Results   Component Value Date    WBC 9.6 02/27/2021    WBC 6.8 02/26/2021    WBC 7.0 02/26/2021       Lab Results   Component Value Date    HGB 11.8 02/27/2021    HGB 11.5 02/26/2021    HGB 11.6 02/26/2021       Lab Results   Component Value Date     02/27/2021     02/26/2021     02/26/2021       Lab Results   Component Value Date    INR 1.16 (H) 02/26/2021    PTT 33 02/26/2021       Lab Results   Component Value Date    POTASSIUM 3.1 (L) 02/27/2021        Antonio Thomas MD  Interventional Radiology  Pager: [phone]    I, Dr Trent Lacy, discussed the case with the resident/fellow and agree with the findings as documented in the assessment and plan.    Trent Lacy MD    Vascular and Interventional Radiology  [facility]      "

## 2021-02-28 ENCOUNTER — APPOINTMENT (OUTPATIENT)
Dept: GENERAL RADIOLOGY | Facility: CLINIC | Age: 36
End: 2021-02-28
Attending: STUDENT IN AN ORGANIZED HEALTH CARE EDUCATION/TRAINING PROGRAM
Payer: MEDICAID

## 2021-02-28 LAB
ALBUMIN SERPL-MCNC: 2.5 G/DL (ref 3.4–5)
ALP SERPL-CCNC: 91 U/L (ref 40–150)
ALT SERPL W P-5'-P-CCNC: 17 U/L (ref 0–70)
ANION GAP SERPL CALCULATED.3IONS-SCNC: 4 MMOL/L (ref 3–14)
ANION GAP SERPL CALCULATED.3IONS-SCNC: 5 MMOL/L (ref 3–14)
ANION GAP SERPL CALCULATED.3IONS-SCNC: 7 MMOL/L (ref 3–14)
ANION GAP SERPL CALCULATED.3IONS-SCNC: 7 MMOL/L (ref 3–14)
AST SERPL W P-5'-P-CCNC: 24 U/L (ref 0–45)
BASE EXCESS BLDV CALC-SCNC: 0.5 MMOL/L
BASE EXCESS BLDV CALC-SCNC: 1.3 MMOL/L
BASE EXCESS BLDV CALC-SCNC: 3.3 MMOL/L
BASE EXCESS BLDV CALC-SCNC: 4.1 MMOL/L
BILIRUB SERPL-MCNC: 2.8 MG/DL (ref 0.2–1.3)
BUN SERPL-MCNC: 35 MG/DL (ref 7–30)
BUN SERPL-MCNC: 39 MG/DL (ref 7–30)
BUN SERPL-MCNC: 40 MG/DL (ref 7–30)
BUN SERPL-MCNC: 44 MG/DL (ref 7–30)
CALCIUM SERPL-MCNC: 7.4 MG/DL (ref 8.5–10.1)
CALCIUM SERPL-MCNC: 7.7 MG/DL (ref 8.5–10.1)
CALCIUM SERPL-MCNC: 7.8 MG/DL (ref 8.5–10.1)
CALCIUM SERPL-MCNC: 7.9 MG/DL (ref 8.5–10.1)
CHLORIDE SERPL-SCNC: 92 MMOL/L (ref 94–109)
CHLORIDE SERPL-SCNC: 94 MMOL/L (ref 94–109)
CHLORIDE SERPL-SCNC: 94 MMOL/L (ref 94–109)
CHLORIDE SERPL-SCNC: 95 MMOL/L (ref 94–109)
CO2 SERPL-SCNC: 28 MMOL/L (ref 20–32)
CO2 SERPL-SCNC: 29 MMOL/L (ref 20–32)
CO2 SERPL-SCNC: 31 MMOL/L (ref 20–32)
CO2 SERPL-SCNC: 32 MMOL/L (ref 20–32)
CREAT SERPL-MCNC: 0.68 MG/DL (ref 0.66–1.25)
CREAT SERPL-MCNC: 0.69 MG/DL (ref 0.66–1.25)
CREAT SERPL-MCNC: 0.7 MG/DL (ref 0.66–1.25)
CREAT SERPL-MCNC: 0.71 MG/DL (ref 0.66–1.25)
ERYTHROCYTE [DISTWIDTH] IN BLOOD BY AUTOMATED COUNT: 15.7 % (ref 10–15)
ERYTHROCYTE [DISTWIDTH] IN BLOOD BY AUTOMATED COUNT: 15.7 % (ref 10–15)
ERYTHROCYTE [DISTWIDTH] IN BLOOD BY AUTOMATED COUNT: 15.8 % (ref 10–15)
ERYTHROCYTE [DISTWIDTH] IN BLOOD BY AUTOMATED COUNT: 15.9 % (ref 10–15)
GFR SERPL CREATININE-BSD FRML MDRD: >90 ML/MIN/{1.73_M2}
GLUCOSE SERPL-MCNC: 101 MG/DL (ref 70–99)
GLUCOSE SERPL-MCNC: 108 MG/DL (ref 70–99)
GLUCOSE SERPL-MCNC: 114 MG/DL (ref 70–99)
GLUCOSE SERPL-MCNC: 118 MG/DL (ref 70–99)
HCO3 BLDV-SCNC: 26 MMOL/L (ref 21–28)
HCO3 BLDV-SCNC: 26 MMOL/L (ref 21–28)
HCO3 BLDV-SCNC: 29 MMOL/L (ref 21–28)
HCO3 BLDV-SCNC: 29 MMOL/L (ref 21–28)
HCT VFR BLD AUTO: 22.3 % (ref 40–53)
HCT VFR BLD AUTO: 23.9 % (ref 40–53)
HCT VFR BLD AUTO: 24 % (ref 40–53)
HCT VFR BLD AUTO: 24.3 % (ref 40–53)
HCT VFR BLD AUTO: 25 % (ref 40–53)
HCT VFR BLD AUTO: 26.1 % (ref 40–53)
HGB BLD-MCNC: 7.3 G/DL (ref 13.3–17.7)
HGB BLD-MCNC: 7.7 G/DL (ref 13.3–17.7)
HGB BLD-MCNC: 7.9 G/DL (ref 13.3–17.7)
HGB BLD-MCNC: 8 G/DL (ref 13.3–17.7)
HGB BLD-MCNC: 8.1 G/DL (ref 13.3–17.7)
HGB BLD-MCNC: 8.5 G/DL (ref 13.3–17.7)
LACTATE BLD-SCNC: 0.6 MMOL/L (ref 0.7–2)
MAGNESIUM SERPL-MCNC: 2 MG/DL (ref 1.6–2.3)
MAGNESIUM SERPL-MCNC: 2.1 MG/DL (ref 1.6–2.3)
MCH RBC QN AUTO: 31.5 PG (ref 26.5–33)
MCH RBC QN AUTO: 31.6 PG (ref 26.5–33)
MCH RBC QN AUTO: 31.7 PG (ref 26.5–33)
MCH RBC QN AUTO: 32.1 PG (ref 26.5–33)
MCHC RBC AUTO-ENTMCNC: 32.2 G/DL (ref 31.5–36.5)
MCHC RBC AUTO-ENTMCNC: 32.4 G/DL (ref 31.5–36.5)
MCHC RBC AUTO-ENTMCNC: 32.6 G/DL (ref 31.5–36.5)
MCHC RBC AUTO-ENTMCNC: 32.7 G/DL (ref 31.5–36.5)
MCHC RBC AUTO-ENTMCNC: 32.9 G/DL (ref 31.5–36.5)
MCHC RBC AUTO-ENTMCNC: 32.9 G/DL (ref 31.5–36.5)
MCV RBC AUTO: 96 FL (ref 78–100)
MCV RBC AUTO: 96 FL (ref 78–100)
MCV RBC AUTO: 97 FL (ref 78–100)
MCV RBC AUTO: 97 FL (ref 78–100)
MCV RBC AUTO: 98 FL (ref 78–100)
MCV RBC AUTO: 99 FL (ref 78–100)
O2/TOTAL GAS SETTING VFR VENT: 21 %
O2/TOTAL GAS SETTING VFR VENT: NORMAL %
OXYHGB MFR BLDV: 38 %
OXYHGB MFR BLDV: 52 %
OXYHGB MFR BLDV: 57 %
OXYHGB MFR BLDV: 58 %
PCO2 BLDV: 38 MM HG (ref 40–50)
PCO2 BLDV: 42 MM HG (ref 40–50)
PCO2 BLDV: 46 MM HG (ref 40–50)
PCO2 BLDV: 50 MM HG (ref 40–50)
PH BLDV: 7.37 PH (ref 7.32–7.43)
PH BLDV: 7.39 PH (ref 7.32–7.43)
PH BLDV: 7.41 PH (ref 7.32–7.43)
PH BLDV: 7.44 PH (ref 7.32–7.43)
PLATELET # BLD AUTO: 145 10E9/L (ref 150–450)
PLATELET # BLD AUTO: 159 10E9/L (ref 150–450)
PLATELET # BLD AUTO: 170 10E9/L (ref 150–450)
PLATELET # BLD AUTO: 176 10E9/L (ref 150–450)
PLATELET # BLD AUTO: 196 10E9/L (ref 150–450)
PLATELET # BLD AUTO: 201 10E9/L (ref 150–450)
PO2 BLDV: 25 MM HG (ref 25–47)
PO2 BLDV: 30 MM HG (ref 25–47)
PO2 BLDV: 31 MM HG (ref 25–47)
PO2 BLDV: 33 MM HG (ref 25–47)
POTASSIUM SERPL-SCNC: 3.2 MMOL/L (ref 3.4–5.3)
POTASSIUM SERPL-SCNC: 3.5 MMOL/L (ref 3.4–5.3)
POTASSIUM SERPL-SCNC: 3.7 MMOL/L (ref 3.4–5.3)
POTASSIUM SERPL-SCNC: 3.7 MMOL/L (ref 3.4–5.3)
PROT SERPL-MCNC: 4.8 G/DL (ref 6.8–8.8)
RBC # BLD AUTO: 2.3 10E12/L (ref 4.4–5.9)
RBC # BLD AUTO: 2.44 10E12/L (ref 4.4–5.9)
RBC # BLD AUTO: 2.5 10E12/L (ref 4.4–5.9)
RBC # BLD AUTO: 2.53 10E12/L (ref 4.4–5.9)
RBC # BLD AUTO: 2.57 10E12/L (ref 4.4–5.9)
RBC # BLD AUTO: 2.65 10E12/L (ref 4.4–5.9)
SODIUM SERPL-SCNC: 128 MMOL/L (ref 133–144)
SODIUM SERPL-SCNC: 129 MMOL/L (ref 133–144)
SODIUM SERPL-SCNC: 130 MMOL/L (ref 133–144)
SODIUM SERPL-SCNC: 131 MMOL/L (ref 133–144)
UFH PPP CHRO-ACNC: 0.47 IU/ML
WBC # BLD AUTO: 5.6 10E9/L (ref 4–11)
WBC # BLD AUTO: 6.3 10E9/L (ref 4–11)
WBC # BLD AUTO: 7.8 10E9/L (ref 4–11)
WBC # BLD AUTO: 8.3 10E9/L (ref 4–11)
WBC # BLD AUTO: 8.8 10E9/L (ref 4–11)
WBC # BLD AUTO: 9.7 10E9/L (ref 4–11)

## 2021-02-28 PROCEDURE — 85027 COMPLETE CBC AUTOMATED: CPT

## 2021-02-28 PROCEDURE — 80053 COMPREHEN METABOLIC PANEL: CPT

## 2021-02-28 PROCEDURE — 85027 COMPLETE CBC AUTOMATED: CPT | Performed by: STUDENT IN AN ORGANIZED HEALTH CARE EDUCATION/TRAINING PROGRAM

## 2021-02-28 PROCEDURE — 93005 ELECTROCARDIOGRAM TRACING: CPT

## 2021-02-28 PROCEDURE — 80048 BASIC METABOLIC PNL TOTAL CA: CPT | Performed by: STUDENT IN AN ORGANIZED HEALTH CARE EDUCATION/TRAINING PROGRAM

## 2021-02-28 PROCEDURE — 250N000011 HC RX IP 250 OP 636: Performed by: STUDENT IN AN ORGANIZED HEALTH CARE EDUCATION/TRAINING PROGRAM

## 2021-02-28 PROCEDURE — 250N000013 HC RX MED GY IP 250 OP 250 PS 637: Performed by: STUDENT IN AN ORGANIZED HEALTH CARE EDUCATION/TRAINING PROGRAM

## 2021-02-28 PROCEDURE — 43235 EGD DIAGNOSTIC BRUSH WASH: CPT | Performed by: INTERNAL MEDICINE

## 2021-02-28 PROCEDURE — 999N000157 HC STATISTIC RCP TIME EA 10 MIN

## 2021-02-28 PROCEDURE — P9041 ALBUMIN (HUMAN),5%, 50ML: HCPCS | Performed by: STUDENT IN AN ORGANIZED HEALTH CARE EDUCATION/TRAINING PROGRAM

## 2021-02-28 PROCEDURE — 99291 CRITICAL CARE FIRST HOUR: CPT | Mod: GC | Performed by: INTERNAL MEDICINE

## 2021-02-28 PROCEDURE — 83735 ASSAY OF MAGNESIUM: CPT

## 2021-02-28 PROCEDURE — 258N000003 HC RX IP 258 OP 636: Performed by: STUDENT IN AN ORGANIZED HEALTH CARE EDUCATION/TRAINING PROGRAM

## 2021-02-28 PROCEDURE — 83605 ASSAY OF LACTIC ACID: CPT | Performed by: INTERNAL MEDICINE

## 2021-02-28 PROCEDURE — 71045 X-RAY EXAM CHEST 1 VIEW: CPT

## 2021-02-28 PROCEDURE — 93010 ELECTROCARDIOGRAM REPORT: CPT | Performed by: INTERNAL MEDICINE

## 2021-02-28 PROCEDURE — 83735 ASSAY OF MAGNESIUM: CPT | Performed by: STUDENT IN AN ORGANIZED HEALTH CARE EDUCATION/TRAINING PROGRAM

## 2021-02-28 PROCEDURE — 999N000015 HC STATISTIC ARTERIAL MONITORING DAILY

## 2021-02-28 PROCEDURE — 85520 HEPARIN ASSAY: CPT

## 2021-02-28 PROCEDURE — 80048 BASIC METABOLIC PNL TOTAL CA: CPT

## 2021-02-28 PROCEDURE — G0500 MOD SEDAT ENDO SERVICE >5YRS: HCPCS | Performed by: INTERNAL MEDICINE

## 2021-02-28 PROCEDURE — 71045 X-RAY EXAM CHEST 1 VIEW: CPT | Mod: 26 | Performed by: RADIOLOGY

## 2021-02-28 PROCEDURE — 250N000013 HC RX MED GY IP 250 OP 250 PS 637

## 2021-02-28 PROCEDURE — 250N000011 HC RX IP 250 OP 636: Performed by: INTERNAL MEDICINE

## 2021-02-28 PROCEDURE — 0DJ08ZZ INSPECTION OF UPPER INTESTINAL TRACT, VIA NATURAL OR ARTIFICIAL OPENING ENDOSCOPIC: ICD-10-PCS | Performed by: INTERNAL MEDICINE

## 2021-02-28 PROCEDURE — 82805 BLOOD GASES W/O2 SATURATION: CPT | Performed by: INTERNAL MEDICINE

## 2021-02-28 PROCEDURE — 200N000002 HC R&B ICU UMMC

## 2021-02-28 PROCEDURE — C9113 INJ PANTOPRAZOLE SODIUM, VIA: HCPCS | Performed by: STUDENT IN AN ORGANIZED HEALTH CARE EDUCATION/TRAINING PROGRAM

## 2021-02-28 PROCEDURE — 99292 CRITICAL CARE ADDL 30 MIN: CPT | Mod: GC | Performed by: INTERNAL MEDICINE

## 2021-02-28 RX ORDER — POTASSIUM CHLORIDE 7.45 MG/ML
10 INJECTION INTRAVENOUS ONCE
Status: DISCONTINUED | OUTPATIENT
Start: 2021-02-28 | End: 2021-02-28 | Stop reason: CLARIF

## 2021-02-28 RX ORDER — POTASSIUM CHLORIDE 7.45 MG/ML
10 INJECTION INTRAVENOUS ONCE
Status: COMPLETED | OUTPATIENT
Start: 2021-02-28 | End: 2021-02-28

## 2021-02-28 RX ORDER — DIMENHYDRINATE 50 MG
25 TABLET ORAL EVERY 6 HOURS PRN
Status: DISCONTINUED | OUTPATIENT
Start: 2021-02-28 | End: 2021-03-10 | Stop reason: HOSPADM

## 2021-02-28 RX ORDER — NALOXONE HYDROCHLORIDE 0.4 MG/ML
0.4 INJECTION, SOLUTION INTRAMUSCULAR; INTRAVENOUS; SUBCUTANEOUS
Status: DISCONTINUED | OUTPATIENT
Start: 2021-02-28 | End: 2021-03-10 | Stop reason: HOSPADM

## 2021-02-28 RX ORDER — POTASSIUM CHLORIDE 29.8 MG/ML
20 INJECTION INTRAVENOUS ONCE
Status: COMPLETED | OUTPATIENT
Start: 2021-02-28 | End: 2021-03-01

## 2021-02-28 RX ORDER — NALOXONE HYDROCHLORIDE 0.4 MG/ML
0.2 INJECTION, SOLUTION INTRAMUSCULAR; INTRAVENOUS; SUBCUTANEOUS
Status: DISCONTINUED | OUTPATIENT
Start: 2021-02-28 | End: 2021-03-10 | Stop reason: HOSPADM

## 2021-02-28 RX ORDER — MAGNESIUM SULFATE HEPTAHYDRATE 40 MG/ML
2 INJECTION, SOLUTION INTRAVENOUS ONCE
Status: COMPLETED | OUTPATIENT
Start: 2021-02-28 | End: 2021-02-28

## 2021-02-28 RX ORDER — CEFTRIAXONE 1 G/1
1 INJECTION, POWDER, FOR SOLUTION INTRAMUSCULAR; INTRAVENOUS EVERY 24 HOURS
Status: DISCONTINUED | OUTPATIENT
Start: 2021-02-28 | End: 2021-03-02

## 2021-02-28 RX ORDER — ONDANSETRON 2 MG/ML
4 INJECTION INTRAMUSCULAR; INTRAVENOUS ONCE
Status: COMPLETED | OUTPATIENT
Start: 2021-02-28 | End: 2021-02-28

## 2021-02-28 RX ORDER — FENTANYL CITRATE 50 UG/ML
100 INJECTION, SOLUTION INTRAMUSCULAR; INTRAVENOUS
Status: DISCONTINUED | OUTPATIENT
Start: 2021-02-28 | End: 2021-03-02

## 2021-02-28 RX ORDER — ALBUMIN, HUMAN INJ 5% 5 %
12.5 SOLUTION INTRAVENOUS ONCE
Status: COMPLETED | OUTPATIENT
Start: 2021-02-28 | End: 2021-02-28

## 2021-02-28 RX ADMIN — POTASSIUM CHLORIDE 10 MEQ: 7.46 INJECTION, SOLUTION INTRAVENOUS at 00:34

## 2021-02-28 RX ADMIN — ALBUMIN HUMAN 12.5 G: 0.05 INJECTION, SOLUTION INTRAVENOUS at 05:00

## 2021-02-28 RX ADMIN — ACETAMINOPHEN 650 MG: 325 TABLET, FILM COATED ORAL at 16:41

## 2021-02-28 RX ADMIN — ONDANSETRON 4 MG: 2 INJECTION INTRAMUSCULAR; INTRAVENOUS at 06:31

## 2021-02-28 RX ADMIN — PANTOPRAZOLE SODIUM 40 MG: 40 INJECTION, POWDER, FOR SOLUTION INTRAVENOUS at 06:04

## 2021-02-28 RX ADMIN — CEFTRIAXONE 1 G: 1 INJECTION, POWDER, FOR SOLUTION INTRAMUSCULAR; INTRAVENOUS at 07:37

## 2021-02-28 RX ADMIN — POTASSIUM CHLORIDE 10 MEQ: 7.46 INJECTION, SOLUTION INTRAVENOUS at 15:23

## 2021-02-28 RX ADMIN — LIDOCAINE 1 PATCH: 560 PATCH PERCUTANEOUS; TOPICAL; TRANSDERMAL at 08:17

## 2021-02-28 RX ADMIN — FENTANYL CITRATE 50 MCG: 50 INJECTION, SOLUTION INTRAMUSCULAR; INTRAVENOUS at 11:04

## 2021-02-28 RX ADMIN — MULTIPLE VITAMINS W/ MINERALS TAB 1 TABLET: TAB at 14:06

## 2021-02-28 RX ADMIN — MAGNESIUM SULFATE IN WATER 2 G: 40 INJECTION, SOLUTION INTRAVENOUS at 08:59

## 2021-02-28 RX ADMIN — MIDAZOLAM 2 MG: 1 INJECTION INTRAMUSCULAR; INTRAVENOUS at 11:07

## 2021-02-28 RX ADMIN — POTASSIUM CHLORIDE 20 MEQ: 29.8 INJECTION, SOLUTION INTRAVENOUS at 23:32

## 2021-02-28 RX ADMIN — PANTOPRAZOLE SODIUM 40 MG: 40 INJECTION, POWDER, FOR SOLUTION INTRAVENOUS at 21:29

## 2021-02-28 RX ADMIN — HEPARIN SODIUM 1550 UNITS/HR: 10000 INJECTION, SOLUTION INTRAVENOUS at 05:27

## 2021-02-28 RX ADMIN — OCTREOTIDE ACETATE 50 MCG/HR: 500 INJECTION, SOLUTION INTRAVENOUS; SUBCUTANEOUS at 07:02

## 2021-02-28 RX ADMIN — THIAMINE HCL TAB 100 MG 100 MG: 100 TAB at 13:57

## 2021-02-28 RX ADMIN — Medication 2 UNITS/HR: at 05:51

## 2021-02-28 RX ADMIN — POTASSIUM CHLORIDE 10 MEQ: 7.46 INJECTION, SOLUTION INTRAVENOUS at 04:59

## 2021-02-28 RX ADMIN — FOLIC ACID 1 MG: 1 TABLET ORAL at 14:01

## 2021-02-28 RX ADMIN — NICOTINE 7 MG/24 HR DAILY TRANSDERMAL PATCH 1 PATCH: at 07:47

## 2021-02-28 ASSESSMENT — ACTIVITIES OF DAILY LIVING (ADL)
ADLS_ACUITY_SCORE: 16
ADLS_ACUITY_SCORE: 16
ADLS_ACUITY_SCORE: 15
ADLS_ACUITY_SCORE: 16
ADLS_ACUITY_SCORE: 17
ADLS_ACUITY_SCORE: 17

## 2021-02-28 ASSESSMENT — MIFFLIN-ST. JEOR: SCORE: 1507.25

## 2021-02-28 NOTE — PLAN OF CARE
Changes this shift: Dobutamine titrated off overnight. Levo and vaso needed to keep map>65. 250 albumin given for CVP 4. K replaced x2. 2 large BM overnight, per pt improvement with nausea with each BM. Voiding 100+/hr. Heparin gtt at 1550, 1st therapeutic check, 2nd scheduled for 1015.     Pt had large coffee ground emesis at 0600. Heparin held. CBC sent. At 0609 pt had 28 beat run of Vt in the 130s. Pt self converted. BMP and mag sent.     Plan: Liver biopsy Monday. Possible EGD. Continue LVAD/transplant workup. Notify MD with pertinent changes.

## 2021-02-28 NOTE — CONSULTS
BRIEF GI NOTE - consult performed previously, this is a progress note    EGD 2/28/21:                                                                                  Impression:          - Moderately severe esophagitis.                        - Hematin (altered blood/coffee-ground-like material) in                        the stomach.                        - Friable duodenal mucosa.                        - No specimens collected.     Plan  Likely etiology for acute on chronic anemia and coffee ground emesis is severe esophagitis and friable mucosa in the setting of critical illness requiring pressors. No evidence of esophageal varices.     - Okay for CLD; no OG or NG tube at this time   - Hold anticoagulation for 48 hours if able  - Ok to stop octreotide  - Continue IV PPI BID  - Transfuse for Hgb < 7 or active bleeding    Discussed with Dr. Encarnacion, Hepatology attending.

## 2021-02-28 NOTE — PLAN OF CARE
Major Shift Events:    - Paracentesis 2L fluid out  - Lethargic most of day post para. Typical according to patient and significant other.  - 3x of 250 albumin given.  - Positional shift a drop in MAP.   - monitor Ficks and ABGs.  Plan:   - Liver Biopsy and LVAD/Transplant workup  For vital signs and complete assessments, please see documentation flowsheets.

## 2021-02-28 NOTE — PROGRESS NOTES
[facility]    Cardiology Progress Note- Cards 2        Date of Admission:  2021     Assessment & Plan: HVSL   Miguel Ángel Henson is a 35 year old male with a PMHx of NICM (LVEF 15%) 2 to EtOH abuse, subcutaneous ICD with hx of VT and tobacco abuse who presented to OSH 20 profoundly volume overloaded with acute on chronic HFrEF, cardiogenic shock and AC requiring transfer to [facility] 2021 for consideration of advanced therapies. Course complicated by UGIB.     Today's Plan:  - EGD   - transfuse for Hb < 7.0  - ceftriaxone for SBP prophylaxis  - hold heparin for 24-48hr  GIB  - IR for transjugular biopsy, will likely be scheduled for Monday  - NPO after midnight  - trend hemodynamics, wean pressors     ---Neuro---  No acute issues      ---Cardiovascular---  # Dilated NICM (LVEF 15%, LVIDd 7.7 cm)  to EtOH   # Acute on chronic HFrEF, NYHA class IV   # Cardiogenic shock   # Medication/clinic follow-up non compliance   Presented to OSH with marked volume overload and cardiogenic shock requiring multiple pressors. Decompensated HF is in setting of medication non-compliance. On admit to [facility], CI is 2.2 (on dobutamine 3 mcg/kg/min, NE 0.06 and vaso 2.4) with CVP of 8 (small collapsible IVC) after significant volume removal (70 lbs) at OSH. Consideration of advanced therapies likely limited by current tobacco use, EtOH abuse, and medication/follow-up noncompliance.   TTE : LVEF 5-10%, LVIDd 8.97 cm, LV apical thrombus, mod-sev MR, mod-sev TR Mild RV dysfunction,  RHC : RA 6, RV 31/6, PA 31/16 (22), PCWP 14, Luis 5.2/3.2, TD 5.4/3.3, SVR 1060, PVR 1.5 on dobutamine 3, vaso 2.4, norepi 0.02  [place] Numbers : CVP 3, PA 28/17 (24), PCWP 12, SvO2 60, CI 2.05, SVR 1634 on vaso 1, norepi 0.06,  2  -dobutamine stopped  -wean NE and vasopressin as able  -hold diuretics given low CVP and ongoing GIB  -PEth in process  -neuropsych evaluation  -obtain  financial approval for advanced therapies evalulation   -cirrhosis w/u as below      # LV apical thrombus  - heparin gtt - held 2/2 GIB     # Subcutaneous ICD with hx of VT  -ICD check unremarkable     ---Respiratory---  # Acute hypoxic respiratory failure 2/2 to CHF/atelectasis, improved   -supplemental O2 as needed to maintain SpO2>92%     # Tobacco abuse  -strict cessation encouraged   - nicotine patch     ---Renal---  # AC, resolved, 2/2 to cardiorenal   Cr 1.5 on admit to OSH, 0.9 on admit to [facility].  -trend renal fxn and UOP      # Hyponatremia, improving, 2/2 to volume overload   -trend BMP      # Primary metabolic alkalosis, suspect 2/2 to significant diuresis     -trend BMP/VBG      # Diuretic induced hypokalemia  - replacement per unit protocol     ---GI---  # Portal hypertension   # Ascites s/p multiple paracentesis   # Hx alcohol abuse with alcoholic pancreatis/hepatitis    No cirrhosis per reports on outside imaging. Ascites felt to be 2/2 to CHF as above. As status of liver will impact advanced therapies discussion.  -PEth as above   -trend LFTs  - appreciate hepatology consultation: obtain transjugular liver biopsy  MELD-Na score: 18 at 2/28/2021  6:13 AM  MELD score: 12 at 2/28/2021  6:13 AM  Calculated from:  Serum Creatinine: 0.68 mg/dL (Rounded to 1 mg/dL) at 2/28/2021  6:13 AM  Serum Sodium: 131 mmol/L at 2/28/2021  6:13 AM  Total Bilirubin: 2.8 mg/dL at 2/28/2021  3:22 AM  INR(ratio): 1.16 at 2/26/2021 12:30 AM  Age: 35 years 3 months    #Upper GIB  Patient with coffee ground emesis. Hb 11 > 8. S/p EGD which showed esophagitis, friable mucosa. No evidence of varices.   - hold octreotide gtt  - continue ceftriaxone for SBP prophylaxis  - continue pantoprazole 40mg bid    ---ID---  No acute issues, no growth on cx from paracentesis   -covid negative   -repeat infectious workup if unable to wean vasopressors     ---Endocrine--   No acute issues     ---Hematology---  # Normocytic Anemia/ ACD  - trend  CBC daily     FEN: 2g Na, 1800cc fluid restricted   DVT Prophylaxis: Pneumatic compression devices (Heparin gtt held)  Code Status: Full       Entered: Jordan Hansen MD 02/28/2021, 2:31 PM       The patient's care was discussed with the Attending Physician, Dr. Pratt.    Jordan Hansen MD   Cardiology Fellow  [facility]  Please see sign in/sign out for up to date coverage information  ______________________________________________________________________    Interval History   Overnight patient with coffee ground emesis. Hb drop from 11.8 > 8.0. heparin stopped. Feels tired this AM. Denies shortness of breath or chest pain. Denies palpitations or lightheadedness.     Data reviewed today: I reviewed all medications, new labs and imaging results over the last 24 hours.    Physical Exam   Vital Signs: Temp: 97.8  F (36.6  C) Temp src: Axillary   Pulse: 99   Resp: 16 SpO2: 97 % O2 Device: None (Room air) Oxygen Delivery: 2 LPM  Weight: 126 lbs 15.76 oz  Constitutional: awake, alert, cooperative, no apparent distress, cachectic in appearance  Eyes: Nonicteric, pupils equal  HENT: NCAT, temporal wasting  Respiratory: Nonlabored,  Decreased air movement over the b/l bases, no crackles or wheezess  Cardiovascular: Regular tachycardia,  III/VI Holosystolic blowing murmur  best appreciated over apex, parasternal heave, laterally displaced PMI  GI: Mildly distended abdomen, NT  Skin: warm and dry, no rash  Musculoskeletal: thin extremities, wwp, no peripheral edema  Neurologic: AAOx3, CN grossly intact, spontaneous movement of all extremities    Data   Recent Labs   Lab 02/28/21  1155 02/28/21  0754 02/28/21  0614 02/28/21  0613 02/28/21  0322 02/27/21  2323 02/27/21  1428 02/27/21  1428 02/27/21  0218 02/27/21  0218 02/26/21  0030 02/26/21  0030   WBC 7.8 8.8 8.3  --  9.7  --   --   --   --  9.6   < > 6.6   HGB 7.9* 7.7* 8.0*  --  8.5*  --   --   --   --  11.8*   < > 12.0*    MCV 96 98 96  --  99  --   --   --   --  96   < > 97    196 176  --  170  --   --   --   --  155   < > 150   INR  --   --   --   --   --   --   --   --   --   --   --  1.16*   NA  --   --   --  131* 128*  --   --  125*  --  123*   < > 128*   POTASSIUM  --   --   --  3.7 3.7 3.5   < > 3.1*   < > 3.0*   < > 3.4   CHLORIDE  --   --   --  94 92*  --   --  84*  --  80*   < > 77*   CO2  --   --   --  29 31  --   --  32  --  36*   < > >45*   BUN  --   --   --  40* 39*  --   --  31*  --  26   < > 23   CR  --   --   --  0.68 0.70  --   --  0.74  --  0.76   < > 0.91   ANIONGAP  --   --   --  7 5  --   --  9  --  8   < > Not Calculated   ALEKSEY  --   --   --  7.8* 7.7*  --   --  8.0*  --  8.5   < > 9.1   GLC  --   --   --  114* 118*  --   --  117*  --  101*   < > 131*   ALBUMIN  --   --   --   --  2.5*  --   --   --   --  2.6*   < > 3.1*   PROTTOTAL  --   --   --   --  4.8*  --   --   --   --  5.5*   < > 5.8*   BILITOTAL  --   --   --   --  2.8*  --   --   --   --  3.4*   < > 3.7*   ALKPHOS  --   --   --   --  91  --   --   --   --  131   < > 128   ALT  --   --   --   --  17  --   --   --   --  21   < > 19   AST  --   --   --   --  24  --   --   --   --  35   < > 31    < > = values in this interval not displayed.     Recent Results (from the past 24 hour(s))   XR Chest Port 1 View    Narrative    EXAM: XR CHEST PORT 1 VW  2/28/2021 3:46 AM     HISTORY:  Verify PA catheter location       COMPARISON:  2/27/2021    FINDINGS:   Frontal radiograph of the chest. The right IJ [place] catheter  projects over the right main branch pulmonary artery. The right upper  extremity PICC tip projects over the mid SVC. Unchanged single-lead  subcutaneous left chest wall implanted cardiac defibrillator. The  cardiac silhouette is enlarged. Decreased right basilar opacities. No  pneumothorax. No large pleural effusion. Osseous structures are  unchanged.      Impression    IMPRESSION:   1. The right IJ [place] catheter projects over the  right main branch  pulmonary artery. Additional support devices are stable.  2. Resolution of right basilar opacities. No new acute airspace  opacities.    I have personally reviewed the examination and initial interpretation  and I agree with the findings.    TIFFANIE GUALLPA MD     Medications     norepinephrine 0.02 mcg/kg/min (02/28/21 1300)     - MEDICATION INSTRUCTIONS -       vasopressin Stopped (02/28/21 1321)       cefTRIAXone  1 g Intravenous Q24H     folic acid  1 mg Oral Daily     lidocaine  1 patch Transdermal Q24H     lidocaine   Transdermal Q8H     multivitamin w/minerals  1 tablet Oral Daily     nicotine  1 patch Transdermal Daily     nicotine   Transdermal Q8H     pantoprazole (PROTONIX) IV  40 mg Intravenous BID     sodium chloride (PF)  10 mL Intravenous Once     vitamin B1  100 mg Oral Daily

## 2021-03-01 ENCOUNTER — APPOINTMENT (OUTPATIENT)
Dept: GENERAL RADIOLOGY | Facility: CLINIC | Age: 36
End: 2021-03-01
Attending: STUDENT IN AN ORGANIZED HEALTH CARE EDUCATION/TRAINING PROGRAM
Payer: MEDICAID

## 2021-03-01 ENCOUNTER — APPOINTMENT (OUTPATIENT)
Dept: INTERVENTIONAL RADIOLOGY/VASCULAR | Facility: CLINIC | Age: 36
End: 2021-03-01
Attending: NURSE PRACTITIONER
Payer: MEDICAID

## 2021-03-01 LAB
ALBUMIN SERPL-MCNC: 2.4 G/DL (ref 3.4–5)
ALP SERPL-CCNC: 106 U/L (ref 40–150)
ALT SERPL W P-5'-P-CCNC: 20 U/L (ref 0–70)
ANION GAP SERPL CALCULATED.3IONS-SCNC: 4 MMOL/L (ref 3–14)
ANION GAP SERPL CALCULATED.3IONS-SCNC: 4 MMOL/L (ref 3–14)
ANION GAP SERPL CALCULATED.3IONS-SCNC: 6 MMOL/L (ref 3–14)
AST SERPL W P-5'-P-CCNC: 34 U/L (ref 0–45)
BASE EXCESS BLDV CALC-SCNC: 2 MMOL/L
BASE EXCESS BLDV CALC-SCNC: 2.9 MMOL/L
BILIRUB SERPL-MCNC: 1.9 MG/DL (ref 0.2–1.3)
BUN SERPL-MCNC: 26 MG/DL (ref 7–30)
BUN SERPL-MCNC: 28 MG/DL (ref 7–30)
BUN SERPL-MCNC: 31 MG/DL (ref 7–30)
CALCIUM SERPL-MCNC: 7.4 MG/DL (ref 8.5–10.1)
CALCIUM SERPL-MCNC: 7.6 MG/DL (ref 8.5–10.1)
CALCIUM SERPL-MCNC: 7.8 MG/DL (ref 8.5–10.1)
CHLORIDE SERPL-SCNC: 101 MMOL/L (ref 94–109)
CHLORIDE SERPL-SCNC: 97 MMOL/L (ref 94–109)
CHLORIDE SERPL-SCNC: 99 MMOL/L (ref 94–109)
CO2 SERPL-SCNC: 26 MMOL/L (ref 20–32)
CO2 SERPL-SCNC: 28 MMOL/L (ref 20–32)
CO2 SERPL-SCNC: 28 MMOL/L (ref 20–32)
CREAT SERPL-MCNC: 0.62 MG/DL (ref 0.66–1.25)
CREAT SERPL-MCNC: 0.62 MG/DL (ref 0.66–1.25)
CREAT SERPL-MCNC: 0.65 MG/DL (ref 0.66–1.25)
ERYTHROCYTE [DISTWIDTH] IN BLOOD BY AUTOMATED COUNT: 15.8 % (ref 10–15)
ERYTHROCYTE [DISTWIDTH] IN BLOOD BY AUTOMATED COUNT: 15.8 % (ref 10–15)
ERYTHROCYTE [DISTWIDTH] IN BLOOD BY AUTOMATED COUNT: 15.9 % (ref 10–15)
ERYTHROCYTE [DISTWIDTH] IN BLOOD BY AUTOMATED COUNT: 16.1 % (ref 10–15)
GFR SERPL CREATININE-BSD FRML MDRD: >90 ML/MIN/{1.73_M2}
GLUCOSE SERPL-MCNC: 95 MG/DL (ref 70–99)
GLUCOSE SERPL-MCNC: 96 MG/DL (ref 70–99)
GLUCOSE SERPL-MCNC: 99 MG/DL (ref 70–99)
HCO3 BLDV-SCNC: 27 MMOL/L (ref 21–28)
HCO3 BLDV-SCNC: 27 MMOL/L (ref 21–28)
HCT VFR BLD AUTO: 22.2 % (ref 40–53)
HCT VFR BLD AUTO: 23.5 % (ref 40–53)
HCT VFR BLD AUTO: 24.8 % (ref 40–53)
HCT VFR BLD AUTO: 25.5 % (ref 40–53)
HGB BLD-MCNC: 7.1 G/DL (ref 13.3–17.7)
HGB BLD-MCNC: 7.8 G/DL (ref 13.3–17.7)
HGB BLD-MCNC: 8 G/DL (ref 13.3–17.7)
HGB BLD-MCNC: 8.2 G/DL (ref 13.3–17.7)
INTERPRETATION ECG - MUSE: NORMAL
MAGNESIUM SERPL-MCNC: 2.3 MG/DL (ref 1.6–2.3)
MCH RBC QN AUTO: 31.3 PG (ref 26.5–33)
MCH RBC QN AUTO: 31.8 PG (ref 26.5–33)
MCH RBC QN AUTO: 31.9 PG (ref 26.5–33)
MCH RBC QN AUTO: 32.2 PG (ref 26.5–33)
MCHC RBC AUTO-ENTMCNC: 32 G/DL (ref 31.5–36.5)
MCHC RBC AUTO-ENTMCNC: 32.2 G/DL (ref 31.5–36.5)
MCHC RBC AUTO-ENTMCNC: 32.3 G/DL (ref 31.5–36.5)
MCHC RBC AUTO-ENTMCNC: 33.2 G/DL (ref 31.5–36.5)
MCV RBC AUTO: 100 FL (ref 78–100)
MCV RBC AUTO: 96 FL (ref 78–100)
MCV RBC AUTO: 98 FL (ref 78–100)
MCV RBC AUTO: 99 FL (ref 78–100)
O2/TOTAL GAS SETTING VFR VENT: 21 %
O2/TOTAL GAS SETTING VFR VENT: 21 %
OXYHGB MFR BLDV: 43 %
OXYHGB MFR BLDV: 46 %
PCO2 BLDV: 40 MM HG (ref 40–50)
PCO2 BLDV: 43 MM HG (ref 40–50)
PH BLDV: 7.41 PH (ref 7.32–7.43)
PH BLDV: 7.44 PH (ref 7.32–7.43)
PLATELET # BLD AUTO: 152 10E9/L (ref 150–450)
PLATELET # BLD AUTO: 166 10E9/L (ref 150–450)
PLATELET # BLD AUTO: 173 10E9/L (ref 150–450)
PLATELET # BLD AUTO: 204 10E9/L (ref 150–450)
PO2 BLDV: 25 MM HG (ref 25–47)
PO2 BLDV: 27 MM HG (ref 25–47)
POTASSIUM SERPL-SCNC: 3.1 MMOL/L (ref 3.4–5.3)
POTASSIUM SERPL-SCNC: 3.4 MMOL/L (ref 3.4–5.3)
POTASSIUM SERPL-SCNC: 3.5 MMOL/L (ref 3.4–5.3)
POTASSIUM SERPL-SCNC: 3.9 MMOL/L (ref 3.4–5.3)
PROT SERPL-MCNC: 4.6 G/DL (ref 6.8–8.8)
RBC # BLD AUTO: 2.27 10E12/L (ref 4.4–5.9)
RBC # BLD AUTO: 2.45 10E12/L (ref 4.4–5.9)
RBC # BLD AUTO: 2.51 10E12/L (ref 4.4–5.9)
RBC # BLD AUTO: 2.55 10E12/L (ref 4.4–5.9)
SODIUM SERPL-SCNC: 130 MMOL/L (ref 133–144)
SODIUM SERPL-SCNC: 131 MMOL/L (ref 133–144)
SODIUM SERPL-SCNC: 133 MMOL/L (ref 133–144)
UPPER GI ENDOSCOPY: NORMAL
WBC # BLD AUTO: 5.4 10E9/L (ref 4–11)
WBC # BLD AUTO: 6 10E9/L (ref 4–11)
WBC # BLD AUTO: 6.4 10E9/L (ref 4–11)
WBC # BLD AUTO: 6.8 10E9/L (ref 4–11)

## 2021-03-01 PROCEDURE — 999N000157 HC STATISTIC RCP TIME EA 10 MIN

## 2021-03-01 PROCEDURE — 71045 X-RAY EXAM CHEST 1 VIEW: CPT

## 2021-03-01 PROCEDURE — 250N000011 HC RX IP 250 OP 636: Performed by: INTERNAL MEDICINE

## 2021-03-01 PROCEDURE — 75970 VASCULAR BIOPSY: CPT | Mod: 26 | Performed by: RADIOLOGY

## 2021-03-01 PROCEDURE — 250N000013 HC RX MED GY IP 250 OP 250 PS 637: Performed by: STUDENT IN AN ORGANIZED HEALTH CARE EDUCATION/TRAINING PROGRAM

## 2021-03-01 PROCEDURE — 88307 TISSUE EXAM BY PATHOLOGIST: CPT | Mod: 26 | Performed by: PATHOLOGY

## 2021-03-01 PROCEDURE — 75970 VASCULAR BIOPSY: CPT

## 2021-03-01 PROCEDURE — 85027 COMPLETE CBC AUTOMATED: CPT | Performed by: INTERNAL MEDICINE

## 2021-03-01 PROCEDURE — 250N000013 HC RX MED GY IP 250 OP 250 PS 637: Performed by: INTERNAL MEDICINE

## 2021-03-01 PROCEDURE — 85027 COMPLETE CBC AUTOMATED: CPT | Performed by: STUDENT IN AN ORGANIZED HEALTH CARE EDUCATION/TRAINING PROGRAM

## 2021-03-01 PROCEDURE — 250N000013 HC RX MED GY IP 250 OP 250 PS 637

## 2021-03-01 PROCEDURE — 99152 MOD SED SAME PHYS/QHP 5/>YRS: CPT

## 2021-03-01 PROCEDURE — C1769 GUIDE WIRE: HCPCS

## 2021-03-01 PROCEDURE — 85027 COMPLETE CBC AUTOMATED: CPT

## 2021-03-01 PROCEDURE — 88307 TISSUE EXAM BY PATHOLOGIST: CPT | Mod: TC

## 2021-03-01 PROCEDURE — 272N000155 HC KIT CR15

## 2021-03-01 PROCEDURE — 84132 ASSAY OF SERUM POTASSIUM: CPT

## 2021-03-01 PROCEDURE — 36011 PLACE CATHETER IN VEIN: CPT | Mod: 51 | Performed by: RADIOLOGY

## 2021-03-01 PROCEDURE — 88313 SPECIAL STAINS GROUP 2: CPT | Mod: TC

## 2021-03-01 PROCEDURE — C9113 INJ PANTOPRAZOLE SODIUM, VIA: HCPCS | Performed by: STUDENT IN AN ORGANIZED HEALTH CARE EDUCATION/TRAINING PROGRAM

## 2021-03-01 PROCEDURE — 99233 SBSQ HOSP IP/OBS HIGH 50: CPT | Mod: GC | Performed by: INTERNAL MEDICINE

## 2021-03-01 PROCEDURE — 80053 COMPREHEN METABOLIC PANEL: CPT | Performed by: STUDENT IN AN ORGANIZED HEALTH CARE EDUCATION/TRAINING PROGRAM

## 2021-03-01 PROCEDURE — 82805 BLOOD GASES W/O2 SATURATION: CPT | Performed by: INTERNAL MEDICINE

## 2021-03-01 PROCEDURE — 0FB03ZX EXCISION OF LIVER, PERCUTANEOUS APPROACH, DIAGNOSTIC: ICD-10-PCS | Performed by: RADIOLOGY

## 2021-03-01 PROCEDURE — C1887 CATHETER, GUIDING: HCPCS

## 2021-03-01 PROCEDURE — 88313 SPECIAL STAINS GROUP 2: CPT | Mod: 26 | Performed by: PATHOLOGY

## 2021-03-01 PROCEDURE — 37200 TRANSCATHETER BIOPSY: CPT | Performed by: RADIOLOGY

## 2021-03-01 PROCEDURE — 36011 PLACE CATHETER IN VEIN: CPT

## 2021-03-01 PROCEDURE — 214N000001 HC R&B CCU UMMC

## 2021-03-01 PROCEDURE — 96130 PSYCL TST EVAL PHYS/QHP 1ST: CPT | Mod: 95 | Performed by: CLINICAL NEUROPSYCHOLOGIST

## 2021-03-01 PROCEDURE — 272N000570 HC SHEATH CR7

## 2021-03-01 PROCEDURE — 90791 PSYCH DIAGNOSTIC EVALUATION: CPT | Mod: 95 | Performed by: CLINICAL NEUROPSYCHOLOGIST

## 2021-03-01 PROCEDURE — 250N000009 HC RX 250: Performed by: RADIOLOGY

## 2021-03-01 PROCEDURE — 250N000011 HC RX IP 250 OP 636: Performed by: STUDENT IN AN ORGANIZED HEALTH CARE EDUCATION/TRAINING PROGRAM

## 2021-03-01 PROCEDURE — 272N000504 HC NEEDLE CR4

## 2021-03-01 PROCEDURE — 37200 TRANSCATHETER BIOPSY: CPT

## 2021-03-01 PROCEDURE — 02PYX3Z REMOVAL OF INFUSION DEVICE FROM GREAT VESSEL, EXTERNAL APPROACH: ICD-10-PCS | Performed by: RADIOLOGY

## 2021-03-01 PROCEDURE — 71045 X-RAY EXAM CHEST 1 VIEW: CPT | Mod: 26 | Performed by: STUDENT IN AN ORGANIZED HEALTH CARE EDUCATION/TRAINING PROGRAM

## 2021-03-01 PROCEDURE — 99153 MOD SED SAME PHYS/QHP EA: CPT

## 2021-03-01 PROCEDURE — 83735 ASSAY OF MAGNESIUM: CPT | Performed by: STUDENT IN AN ORGANIZED HEALTH CARE EDUCATION/TRAINING PROGRAM

## 2021-03-01 PROCEDURE — 255N000002 HC RX 255 OP 636: Performed by: RADIOLOGY

## 2021-03-01 PROCEDURE — 999N000015 HC STATISTIC ARTERIAL MONITORING DAILY

## 2021-03-01 PROCEDURE — 99152 MOD SED SAME PHYS/QHP 5/>YRS: CPT | Performed by: RADIOLOGY

## 2021-03-01 PROCEDURE — 80048 BASIC METABOLIC PNL TOTAL CA: CPT | Performed by: INTERNAL MEDICINE

## 2021-03-01 PROCEDURE — 250N000011 HC RX IP 250 OP 636: Performed by: RADIOLOGY

## 2021-03-01 PROCEDURE — 96131 PSYCL TST EVAL PHYS/QHP EA: CPT | Mod: 95 | Performed by: CLINICAL NEUROPSYCHOLOGIST

## 2021-03-01 RX ORDER — SPIRONOLACTONE 25 MG/1
25 TABLET ORAL DAILY
Status: DISCONTINUED | OUTPATIENT
Start: 2021-03-01 | End: 2021-03-04

## 2021-03-01 RX ORDER — NALOXONE HYDROCHLORIDE 0.4 MG/ML
0.2 INJECTION, SOLUTION INTRAMUSCULAR; INTRAVENOUS; SUBCUTANEOUS
Status: DISCONTINUED | OUTPATIENT
Start: 2021-03-01 | End: 2021-03-01 | Stop reason: HOSPADM

## 2021-03-01 RX ORDER — FENTANYL CITRATE 50 UG/ML
25-50 INJECTION, SOLUTION INTRAMUSCULAR; INTRAVENOUS EVERY 5 MIN PRN
Status: DISCONTINUED | OUTPATIENT
Start: 2021-03-01 | End: 2021-03-01 | Stop reason: HOSPADM

## 2021-03-01 RX ORDER — FUROSEMIDE 40 MG
40 TABLET ORAL DAILY
Status: DISCONTINUED | OUTPATIENT
Start: 2021-03-01 | End: 2021-03-01

## 2021-03-01 RX ORDER — NALOXONE HYDROCHLORIDE 0.4 MG/ML
0.4 INJECTION, SOLUTION INTRAMUSCULAR; INTRAVENOUS; SUBCUTANEOUS
Status: DISCONTINUED | OUTPATIENT
Start: 2021-03-01 | End: 2021-03-01 | Stop reason: HOSPADM

## 2021-03-01 RX ORDER — SPIRONOLACTONE 25 MG/1
25 TABLET ORAL DAILY
Status: CANCELLED | OUTPATIENT
Start: 2021-03-01

## 2021-03-01 RX ORDER — HEPARIN SODIUM 200 [USP'U]/100ML
1 INJECTION, SOLUTION INTRAVENOUS CONTINUOUS PRN
Status: DISCONTINUED | OUTPATIENT
Start: 2021-03-01 | End: 2021-03-01 | Stop reason: HOSPADM

## 2021-03-01 RX ORDER — FLUMAZENIL 0.1 MG/ML
0.2 INJECTION, SOLUTION INTRAVENOUS
Status: DISCONTINUED | OUTPATIENT
Start: 2021-03-01 | End: 2021-03-01 | Stop reason: HOSPADM

## 2021-03-01 RX ORDER — FUROSEMIDE 40 MG
40 TABLET ORAL
Status: DISCONTINUED | OUTPATIENT
Start: 2021-03-02 | End: 2021-03-02

## 2021-03-01 RX ORDER — IODIXANOL 320 MG/ML
150 INJECTION, SOLUTION INTRAVASCULAR ONCE
Status: COMPLETED | OUTPATIENT
Start: 2021-03-01 | End: 2021-03-01

## 2021-03-01 RX ORDER — POTASSIUM CHLORIDE 750 MG/1
10 TABLET, EXTENDED RELEASE ORAL ONCE
Status: COMPLETED | OUTPATIENT
Start: 2021-03-01 | End: 2021-03-01

## 2021-03-01 RX ORDER — POTASSIUM CHLORIDE 29.8 MG/ML
20 INJECTION INTRAVENOUS ONCE
Status: COMPLETED | OUTPATIENT
Start: 2021-03-01 | End: 2021-03-01

## 2021-03-01 RX ORDER — LANOLIN ALCOHOL/MO/W.PET/CERES
3 CREAM (GRAM) TOPICAL
Status: DISCONTINUED | OUTPATIENT
Start: 2021-03-01 | End: 2021-03-10 | Stop reason: HOSPADM

## 2021-03-01 RX ORDER — POTASSIUM CHLORIDE 750 MG/1
20 TABLET, EXTENDED RELEASE ORAL ONCE
Status: COMPLETED | OUTPATIENT
Start: 2021-03-01 | End: 2021-03-01

## 2021-03-01 RX ADMIN — THIAMINE HCL TAB 100 MG 100 MG: 100 TAB at 09:43

## 2021-03-01 RX ADMIN — FENTANYL CITRATE 50 MCG: 50 INJECTION, SOLUTION INTRAMUSCULAR; INTRAVENOUS at 10:49

## 2021-03-01 RX ADMIN — CEFTRIAXONE 1 G: 1 INJECTION, POWDER, FOR SOLUTION INTRAMUSCULAR; INTRAVENOUS at 09:44

## 2021-03-01 RX ADMIN — LIDOCAINE 1 PATCH: 560 PATCH PERCUTANEOUS; TOPICAL; TRANSDERMAL at 09:43

## 2021-03-01 RX ADMIN — PANTOPRAZOLE SODIUM 40 MG: 40 INJECTION, POWDER, FOR SOLUTION INTRAVENOUS at 09:43

## 2021-03-01 RX ADMIN — MELATONIN TAB 3 MG 3 MG: 3 TAB at 22:46

## 2021-03-01 RX ADMIN — LIDOCAINE HYDROCHLORIDE 7 ML: 10 INJECTION, SOLUTION EPIDURAL; INFILTRATION; INTRACAUDAL; PERINEURAL at 11:09

## 2021-03-01 RX ADMIN — POTASSIUM CHLORIDE 20 MEQ: 29.8 INJECTION, SOLUTION INTRAVENOUS at 06:53

## 2021-03-01 RX ADMIN — FUROSEMIDE 40 MG: 40 TABLET ORAL at 12:40

## 2021-03-01 RX ADMIN — POTASSIUM CHLORIDE 10 MEQ: 750 TABLET, EXTENDED RELEASE ORAL at 22:46

## 2021-03-01 RX ADMIN — PANTOPRAZOLE SODIUM 40 MG: 40 INJECTION, POWDER, FOR SOLUTION INTRAVENOUS at 20:11

## 2021-03-01 RX ADMIN — POTASSIUM CHLORIDE 20 MEQ: 750 TABLET, EXTENDED RELEASE ORAL at 14:55

## 2021-03-01 RX ADMIN — ACETAMINOPHEN 650 MG: 325 TABLET, FILM COATED ORAL at 22:45

## 2021-03-01 RX ADMIN — SPIRONOLACTONE 25 MG: 25 TABLET, FILM COATED ORAL at 12:40

## 2021-03-01 RX ADMIN — IODIXANOL 30 ML: 320 INJECTION, SOLUTION INTRAVASCULAR at 11:27

## 2021-03-01 RX ADMIN — MIDAZOLAM 1 MG: 1 INJECTION INTRAMUSCULAR; INTRAVENOUS at 10:50

## 2021-03-01 RX ADMIN — FOLIC ACID 1 MG: 1 TABLET ORAL at 09:43

## 2021-03-01 RX ADMIN — HEPARIN SODIUM 1 BAG: 200 INJECTION, SOLUTION INTRAVENOUS at 10:58

## 2021-03-01 RX ADMIN — MIDAZOLAM 1 MG: 1 INJECTION INTRAMUSCULAR; INTRAVENOUS at 10:36

## 2021-03-01 RX ADMIN — FENTANYL CITRATE 50 MCG: 50 INJECTION, SOLUTION INTRAMUSCULAR; INTRAVENOUS at 10:35

## 2021-03-01 RX ADMIN — FENTANYL CITRATE 50 MCG: 50 INJECTION, SOLUTION INTRAMUSCULAR; INTRAVENOUS at 11:01

## 2021-03-01 RX ADMIN — MULTIPLE VITAMINS W/ MINERALS TAB 1 TABLET: TAB at 09:43

## 2021-03-01 RX ADMIN — MIDAZOLAM 1 MG: 1 INJECTION INTRAMUSCULAR; INTRAVENOUS at 11:02

## 2021-03-01 RX ADMIN — ACETAMINOPHEN 650 MG: 325 TABLET, FILM COATED ORAL at 16:44

## 2021-03-01 RX ADMIN — NICOTINE 7 MG/24 HR DAILY TRANSDERMAL PATCH 1 PATCH: at 09:42

## 2021-03-01 ASSESSMENT — ACTIVITIES OF DAILY LIVING (ADL)
ADLS_ACUITY_SCORE: 15

## 2021-03-01 ASSESSMENT — MIFFLIN-ST. JEOR
SCORE: 1525.25
SCORE: 1519.11

## 2021-03-01 NOTE — PLAN OF CARE
Alert and oriented x4, occasionally lethargic. MARIZA.   MAP within goal range, weaned off all pressors since 13:00, tolerated well.  HR  SR.  Latest PAP 30/18, CVP 9, SVO2 57%, CI 2.77. SVR 1009.  Spontaneous breathing on room air with good SpO2.  Mild dyspnea on exertion.  Oral clear liquid diet tolerated well, no nausea or vomiting, no GIB observed.  Hb stable.  Self void with adequate urine output.  C/o generalized soreness, tylenol given.

## 2021-03-01 NOTE — PLAN OF CARE
Nights:  ARMAND  Alert and oriented x4, occasionally lethargic. MARIZA.   MAP within goal range no pressors needed. HR  SR.  Latest PAP 40/18, CVP 17, SVO2 46-50%, CI 2.4. .  Spontaneous breathing on room air with good SpO2.  Mild dyspnea on exertion.  Oral clear liquid diet tolerated well but NPO after midnight. no nausea or vomiting, no GIB observed.  Med Black tar bm.  Hgb 7.1 (MD updated) and will recheck after 8 am.  Self void with adequate urine output.  C/o generalized soreness, tylenol given.   P:  Liver Bxp today unsure of time.  Monitor for bleeding.

## 2021-03-01 NOTE — PROGRESS NOTES
No further evidence of bleeding.   5 mm HG portosystemic gradient.  Awaiting liver biopsy.    Yeny Encarnacion MD    Hepatology/Liver Transplant  Medical Director, Liver Transplantation  [facility]    Appointments [phone]  Clinic Fax [phone]  Transplant Care [phone] Option 4  Transplant Fax [phone]  Administrative Office [phone]  Administrative Fax [phone]  ===================================================================

## 2021-03-01 NOTE — PRE-PROCEDURE
GENERAL PRE-PROCEDURE:   Procedure:  Transjugular liver biopsy and pressure measurements  Date/Time:  3/1/2021 10:10 AM    Verbal consent obtained?: Yes    Written consent obtained?: Yes    Risks and benefits: Risks, benefits and alternatives were discussed    Consent given by:  Patient  Patient states understanding of procedure being performed: Yes    Patient's understanding of procedure matches consent: Yes    Procedure consent matches procedure scheduled: Yes    Appropriately NPO:  Yes  ASA Class:  Class 3- Severe systemic disease, definite functional limitations  Mallampati  :  Grade 2- soft palate, base of uvula, tonsillar pillars, and portion of posterior pharyngeal wall visible  Heart:  Normal heart sounds and rate  History & Physical reviewed:  History and physical reviewed and no updates needed  Statement of review:  I have reviewed the lab findings, diagnostic data, medications, and the plan for sedation

## 2021-03-01 NOTE — PLAN OF CARE
Major Shift Events: HR/BP stable. [place]/ a line removed. Remains on RA. Has a nonproductive cough. Became agitated this morning stating that staff are keeping pt here against his will. RN educated patient that he is not here against his will, and if he wishes to leave RN will contact the primary team. During rounds this was voiced to Cards 2 and patient did state that he would not like to die and in order to stay alive needed to follow the doctor's recommendations. Pt appeared to be less agitated as the day has gone on and was allowed to have a diet again after liver biopsy. Pt tolerated liver biopsy well. Has been complaining of mild back pain. Voiding spontaneously, no BM this shift. Has been needing reinforcement education on fluid restriction as patient is constantly asking for fluids. Changed coccyx and foot wound today. Labs remained stable throughout shift, no transfusions needed.     Plan: Transfer to floor.    For vital signs and complete assessments, please see documentation flowsheets.

## 2021-03-01 NOTE — PROCEDURES
PA Catheter Closing Numbers  Date CVP SPAP DPAP MPAP PCWP SV02 lona CO lona CI MAP SVR  PVR Inotropes/Pressors/  Interventions Notes    3/1/21 13 36 24 28 16 43 3.6 2.1 84 1622 3.3 None

## 2021-03-01 NOTE — PROGRESS NOTES
[facility]    Cardiology Progress Note- Cards 2        Date of Admission:  2021     Assessment & Plan: HVSL   Miguel Ángel Henson is a 35 year old male with a PMHx of NICM (LVEF 15%) 2/ to EtOH abuse, subcutaneous ICD with hx of VT and tobacco abuse who presented to OSH 20 profoundly volume overloaded with acute on chronic HFrEF, cardiogenic shock and AC requiring transfer to [facility] 2021 for consideration of advanced therapies. Course complicated by UGIB due to esophagitis.     Today's Plan:  - trend hgb q8hr, transfuse for Hb < 7.0  - hold heparin for 48hr  GIB  - IR for transjugular biopsy  - remove PA catheter, remove arterial line, transfer to floor  - lasix 40mg PO for goal net negative ~500cc  - spironolactone 25mg daily  - ambulate     ---Neuro---  # Polysubstance dependence  - neuropsych evaluation pending  - counseled on strict alcohol and tobacco cessation       ---Cardiovascular---  # Dilated NICM (LVEF 15%, LVIDd 7.7 cm) 2/ to EtOH   # Acute on chronic HFrEF, NYHA class IV   # Cardiogenic shock   # Medication/clinic follow-up non compliance   Presented to OSH with marked volume overload and cardiogenic shock requiring multiple pressors. Decompensated HF is in setting of medication non-compliance. On admit to [facility], CI is 2.2 (on dobutamine 3 mcg/kg/min, NE 0.06 and vaso 2.4) with CVP of 8 (small collapsible IVC) after significant volume removal (70 lbs) at OSH. Consideration of advanced therapies likely limited by current tobacco use, EtOH abuse, and medication/follow-up noncompliance.   TTE : LVEF 5-10%, LVIDd 8.97 cm, LV apical thrombus, mod-sev MR, mod-sev TR Mild RV dysfunction,  RHC : RA 6, RV 31/6, PA 31/16 (22), PCWP 14, Luis 5.2/3.2, TD 5.4/3.3, SVR 1060, PVR 1.5 on dobutamine 3, vaso 2.4, norepi 0.02  [place] Numbers : CVP 3, PA 28/17 (24), PCWP 12, SvO2 60, CI 2.05, SVR 1634 on vaso 1, norepi 0.06,  2  [place] numbers  2/28 CVP 13, PA 36/24, PCWP 16, SvO2 43, CI/CO 2.1/3.6, SVR 1622  -off dobutamine and pressors  -volume: hypervolemic, start lasix 40mg po with gaol negative 500cc  - start spironolactone  -PEth in process   -neuropsych evaluation: ordered  -obtain financial approval for advanced therapies evalulation   -cirrhosis w/u as below      # LV apical thrombus  - heparin gtt - held for 48hrs 2/2 GIB     # Subcutaneous ICD with hx of VT  -ICD check unremarkable     ---Respiratory---  # Acute hypoxic respiratory failure 2/2 to CHF/atelectasis, improved   -supplemental O2 as needed to maintain SpO2>92%     # Tobacco abuse  -strict cessation encouraged   - nicotine patch     ---Renal---  # Hyponatremia, improving, 2/2 to volume overload   -trend BMP      # Primary metabolic alkalosis, suspect 2/2 to significant diuresis     -trend BMP/VBG      # Diuretic induced hypokalemia  - replacement per unit protocol  - start spironolactone     ---GI---  # Portal hypertension   # Ascites s/p multiple paracentesis   # Hx alcohol abuse with alcoholic pancreatis/hepatitis    No cirrhosis per reports on outside imaging. Ascites felt to be 2/2 to CHF as above. As status of liver will impact advanced therapies discussion.  -PEth as above   -trend LFTs  - appreciate hepatology consultation: obtain transjugular liver biopsy  -start furosemide/spirnolactone     #Upper GIB  Patient with coffee ground emesis. Hb 11 > 8. S/p EGD which showed esophagitis, friable mucosa. No evidence of varices.   - continue ceftriaxone for SBP prophylaxis  - continue pantoprazole 40mg bid  - hemoglobin checks q8hr     ---ID---  No acute issues, no growth on cx from paracentesis   -covid negative   -CTX for SBP prophylaxis     ---Endocrine--   No acute issues     ---Hematology---  # Acute Blood Loss Anemia 2/2 UGIB  - transfuse for Hgb<7     FEN: 2g Na, 1800cc fluid restricted   DVT Prophylaxis: Pneumatic compression devices (Heparin gtt held)  Code  Status: Full     Disposition Plan   Transfer out of CVICU. PT/OT consult. Transfer agreement in place.      Entered: Renzo Dorman MD 03/01/2021, 7:22 AM       The patient's care was discussed with the Attending Physician, Dr. Pratt.    Renzo Dorman MD   Cardiology Fellow  [facility] [id number]  [facility]  Please see sign in/sign out for up to date coverage information  ______________________________________________________________________    Interval History   Underwent EGD for hematemesis which revealed esophagitis without varices. Hemoglobin stabilized between 7-8. Weaned from pressors and inotropes. Frustrated with hospitalization and hoping to return home soon. Otherwise denies chest pain, palpitations, shortness of breath, n/v/d. No further emesis.    Data reviewed today: I reviewed all medications, new labs and imaging results over the last 24 hours.    Physical Exam   Vital Signs: Temp: 97.9  F (36.6  C) Temp src: Oral   Pulse: 97   Resp: 20 SpO2: 98 % O2 Device: None (Room air)    Weight: 130 lbs 15.25 oz  Constitutional: awake, alert, cooperative, no apparent distress, cachectic in appearance  Eyes: Nonicteric, pupils equal  HENT: NCAT, temporal wasting  Respiratory: Nonlabored,  Decreased air movement over the b/l bases, no crackles or wheezess  Cardiovascular: RRR,  III/VI Holosystolic blowing murmur  best appreciated over apex, parasternal heave, laterally displaced PMI  GI: Distended abdomen, nonrigid, NT  Skin: warm and dry, no rash  Musculoskeletal: thin extremities, wwp, no peripheral edema  Neurologic: AAOx3, CN grossly intact, spontaneous movement of all extremities    Data   Recent Labs   Lab 03/01/21  0830 03/01/21  0325 02/28/21  2219 02/28/21  0322 02/28/21  0322 02/26/21  0030 02/26/21  0030   WBC 6.4 5.4 5.6   < > 9.7   < > 6.6   HGB 8.0* 7.1* 7.3*   < > 8.5*   < > 12.0*   MCV 99 98 97   < > 99   < > 97    152 145*   < > 170   < > 150   INR  --    --   --   --   --   --  1.16*    130* 130*   < > 128*   < > 128*   POTASSIUM 3.9 3.1* 3.2*   < > 3.7   < > 3.4   CHLORIDE 101 97 95   < > 92*   < > 77*   CO2 28 28 32   < > 31   < > >45*   BUN 28 31* 35*   < > 39*   < > 23   CR 0.65* 0.62* 0.69   < > 0.70   < > 0.91   ANIONGAP 4 4 4   < > 5   < > Not Calculated   ALEKSEY 7.6* 7.4* 7.4*   < > 7.7*   < > 9.1   GLC 96 99 101*   < > 118*   < > 131*   ALBUMIN  --  2.4*  --   --  2.5*   < > 3.1*   PROTTOTAL  --  4.6*  --   --  4.8*   < > 5.8*   BILITOTAL  --  1.9*  --   --  2.8*   < > 3.7*   ALKPHOS  --  106  --   --  91   < > 128   ALT  --  20  --   --  17   < > 19   AST  --  34  --   --  24   < > 31    < > = values in this interval not displayed.     Recent Results (from the past 24 hour(s))   XR Chest Port 1 View    Narrative    Chest radiograph 3/1/2021 2:03 AM    HISTORY: Verify pulmonary artery catheter location    COMPARISON: 2/28/2021    FINDINGS:  Single AP view of the chest. Right IJ [place] catheter tip overlying  the right pulmonary artery. Left chest wall cardiac device with lead  in similar position. Right upper extremity PICC tip overlying the mid  SVC. Trachea is midline. Cardiac silhouette is similar in size. No  pneumothorax or significant pleural effusion. Mild bibasilar streaky  opacities.      Impression    IMPRESSION:  1. Right IJ [place]-Richy catheter tip overlying the right pulmonary  artery.  2. Bibasilar streaky opacities, likely atelectasis.    I have personally reviewed the examination and initial interpretation  and I agree with the findings.    DELMIS WORRELL MD     Medications     heparin       norepinephrine Stopped (02/28/21 1323)     - MEDICATION INSTRUCTIONS -       sodium chloride 0.9%       vasopressin Stopped (02/28/21 1321)       cefTRIAXone  1 g Intravenous Q24H     folic acid  1 mg Oral Daily     furosemide  40 mg Oral Daily     iodixanol  150 mL Intravenous Once     lidocaine  1 patch Transdermal Q24H     lidocaine   Transdermal  Q8H     multivitamin w/minerals  1 tablet Oral Daily     nicotine  1 patch Transdermal Daily     nicotine   Transdermal Q8H     pantoprazole (PROTONIX) IV  40 mg Intravenous BID     sodium chloride (PF)  10 mL Intravenous Once     spironolactone  25 mg Oral Daily     vitamin B1  100 mg Oral Daily

## 2021-03-01 NOTE — PROCEDURES
[facility]    Procedure: IR Procedure Note    Date/Time: 3/1/2021 11:25 AM  Performed by: Dandre Mantilla MD  Authorized by: Dandre Mantilla MD     UNIVERSAL PROTOCOL   Site Marked: NA  Prior Images Obtained and Reviewed:  Yes  Required items: Required blood products, implants, devices and special equipment available    Patient identity confirmed:  Verbally with patient, arm band, provided demographic data and hospital-assigned identification number  Patient was reevaluated immediately before administering moderate or deep sedation or anesthesia  Confirmation Checklist:  Patient's identity using two indicators, relevant allergies, procedure was appropriate and matched the consent or emergent situation and correct equipment/implants were available  Time out: Immediately prior to the procedure a time out was called    Universal Protocol: the Joint Commission Universal Protocol was followed    Preparation: Patient was prepped and draped in usual sterile fashion    ESBL (mL):  8         ANESTHESIA    Anesthesia: Local infiltration  Local Anesthetic:  Lidocaine 1% without epinephrine  Anesthetic Total (mL):  10      SEDATION    Patient Sedated: Yes    Sedation Type:  Moderate (conscious) sedation  Sedation:  Fentanyl and midazolam  Vital signs: Vital signs monitored during sedation    Fluoroscopy Time: 6 minute(s)  See dictated procedure note for full details.  Findings: Removed swan radha catheter. Right neck and sheath prepped and draped in the usual sterile fashion. Exchanged sheath for new sheath.    Middle hepatic vein catheterized. 19 mmHg wedge pressure. 15 mmHg free hepatic pressure. 14 mmHg IVC pressure. 14 mmHg right atrium.    Middle hepatic vein catheterized again. Two 18 gauge core biopsies taken.    Post biopsy venogram demonstrates no immediate complication.    Sheath removed and pressure held over the venotomy site until hemostasis was achieved. Sterile  dressing applied.    Specimens: none    Complications: None    Condition: Stable    PROCEDURE   Patient Tolerance:  Patient tolerated the procedure well with no immediate complications    Length of time physician/provider present for 1:1 monitoring during sedation: 30

## 2021-03-01 NOTE — PROGRESS NOTES
Patient Name: Miguel Ángel Henson  Medical Record Number: [id number]  Today's Date: 3/1/2021    Procedure: Transjugular Liver Biopsy with portal pressure measurement.   Proceduralist:   Dr Mantilla    Procedure Start: 1037  Procedure end: 1115  Sedation medications administered: Midazolam 3 mg, Fentanyl 150 mcg       Report given to: Veronica GUZMÁN  : N/A    Other Notes: Pt arrived to IR room 5 from . Consent reviewed. Pt denies any questions or concerns regarding procedure. Pt positioned supine and monitored per protocol. Dr Mantilla removed [place]-radha catheter prior to procedure.  2 Liver biopsy cores obtained and sent to surgical pathology.  Right Jugular Sheath removed post Procedure.  Pressure held by Dr Mantilla for 10 minutes until Hemo statis obtained.  Site clean, dry, and flat.  Pt tolerated procedure without any noted complications. Pt transferred back to  on ICU monitoring with RN.

## 2021-03-01 NOTE — CONSULTS
"    Interventional Radiology Consult Service Note    Patient is on IR schedule 3/1 for a transjugular liver biopsy with portal pressures, can use RIJ sheath and then remove.   Labs WNL for procedure. COVID neg 2/26.  Pt is NPO.  Consent will be done prior to procedure.     Please contact the IR charge RN at [phone] for estimated time of procedure.     Case discussed with Dr. Mantilla and Dr. Dorman. This is a 35 year old male with a relevant PMH of NICM (LVEF 15%) 2/2 alcohol abuse, ICD placement, who presented to OSH on 2/17/2021 with volume overload and acute HFrEF exacerbation/cardiogenic shock requiring pressors and AC. IR was consulted for transjugular liver biopsy to better elucidate the etiology of patient's developing cirrhosis as to whether it is cardiogenic in etiology or secondary to alcohol abuse and as to whether patient would be candidate for LVAD and/or heart transplant.    Dx labs entered by Dr. Dorman.    Expected date of discharge: TBD    Vitals:   Pulse 102   Temp 97.9  F (36.6  C) (Oral)   Resp 10   Ht 1.778 m (5' 10\")   Wt 59.4 kg (130 lb 15.3 oz)   SpO2 (!) 86%   BMI 18.79 kg/m      Pertinent Labs:     Lab Results   Component Value Date    WBC 5.4 03/01/2021    WBC 5.6 02/28/2021    WBC 6.3 02/28/2021       Lab Results   Component Value Date    HGB 7.1 03/01/2021    HGB 7.3 02/28/2021    HGB 8.1 02/28/2021       Lab Results   Component Value Date     03/01/2021     02/28/2021     02/28/2021       Lab Results   Component Value Date    INR 1.16 (H) 02/26/2021    PTT 33 02/26/2021       Lab Results   Component Value Date    POTASSIUM 3.1 (L) 03/01/2021        PAOLO Washington CNP  Interventional Radiology  Pager: [phone]    "
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
## 2021-03-02 ENCOUNTER — APPOINTMENT (OUTPATIENT)
Dept: OCCUPATIONAL THERAPY | Facility: CLINIC | Age: 36
End: 2021-03-02
Payer: MEDICAID

## 2021-03-02 ENCOUNTER — APPOINTMENT (OUTPATIENT)
Dept: CT IMAGING | Facility: CLINIC | Age: 36
End: 2021-03-02
Attending: INTERNAL MEDICINE
Payer: MEDICAID

## 2021-03-02 LAB
ALBUMIN SERPL-MCNC: 2.6 G/DL (ref 3.4–5)
ALP SERPL-CCNC: 182 U/L (ref 40–150)
ALT SERPL W P-5'-P-CCNC: 33 U/L (ref 0–70)
ANION GAP SERPL CALCULATED.3IONS-SCNC: 8 MMOL/L (ref 3–14)
ANION GAP SERPL CALCULATED.3IONS-SCNC: 8 MMOL/L (ref 3–14)
APTT PPP: 139 SEC (ref 22–37)
AST SERPL W P-5'-P-CCNC: 49 U/L (ref 0–45)
BILIRUB SERPL-MCNC: 1.7 MG/DL (ref 0.2–1.3)
BUN SERPL-MCNC: 21 MG/DL (ref 7–30)
BUN SERPL-MCNC: 22 MG/DL (ref 7–30)
CALCIUM SERPL-MCNC: 7.8 MG/DL (ref 8.5–10.1)
CALCIUM SERPL-MCNC: 8.1 MG/DL (ref 8.5–10.1)
CHLORIDE SERPL-SCNC: 93 MMOL/L (ref 94–109)
CHLORIDE SERPL-SCNC: 93 MMOL/L (ref 94–109)
CO2 SERPL-SCNC: 25 MMOL/L (ref 20–32)
CO2 SERPL-SCNC: 25 MMOL/L (ref 20–32)
COPATH REPORT: NORMAL
COPATH REPORT: NORMAL
CREAT SERPL-MCNC: 0.71 MG/DL (ref 0.66–1.25)
CREAT SERPL-MCNC: 0.76 MG/DL (ref 0.66–1.25)
ERYTHROCYTE [DISTWIDTH] IN BLOOD BY AUTOMATED COUNT: 16 % (ref 10–15)
ERYTHROCYTE [DISTWIDTH] IN BLOOD BY AUTOMATED COUNT: 16.3 % (ref 10–15)
ERYTHROCYTE [DISTWIDTH] IN BLOOD BY AUTOMATED COUNT: 16.4 % (ref 10–15)
GFR SERPL CREATININE-BSD FRML MDRD: >90 ML/MIN/{1.73_M2}
GFR SERPL CREATININE-BSD FRML MDRD: >90 ML/MIN/{1.73_M2}
GLUCOSE SERPL-MCNC: 102 MG/DL (ref 70–99)
GLUCOSE SERPL-MCNC: 118 MG/DL (ref 70–99)
HCT VFR BLD AUTO: 22.7 % (ref 40–53)
HCT VFR BLD AUTO: 24.2 % (ref 40–53)
HCT VFR BLD AUTO: 24.3 % (ref 40–53)
HGB BLD-MCNC: 7.2 G/DL (ref 13.3–17.7)
HGB BLD-MCNC: 7.6 G/DL (ref 13.3–17.7)
HGB BLD-MCNC: 7.7 G/DL (ref 13.3–17.7)
HGB BLD-MCNC: 7.7 G/DL (ref 13.3–17.7)
INR PPP: 1.15 (ref 0.86–1.14)
INTERPRETATION ECG - MUSE: NORMAL
MAGNESIUM SERPL-MCNC: 2.1 MG/DL (ref 1.6–2.3)
MCH RBC QN AUTO: 31.4 PG (ref 26.5–33)
MCH RBC QN AUTO: 32 PG (ref 26.5–33)
MCH RBC QN AUTO: 32.5 PG (ref 26.5–33)
MCHC RBC AUTO-ENTMCNC: 31.3 G/DL (ref 31.5–36.5)
MCHC RBC AUTO-ENTMCNC: 31.7 G/DL (ref 31.5–36.5)
MCHC RBC AUTO-ENTMCNC: 31.8 G/DL (ref 31.5–36.5)
MCV RBC AUTO: 100 FL (ref 78–100)
MCV RBC AUTO: 101 FL (ref 78–100)
MCV RBC AUTO: 102 FL (ref 78–100)
PETH BLD-MCNC: NEGATIVE NG/ML
PLATELET # BLD AUTO: 221 10E9/L (ref 150–450)
PLATELET # BLD AUTO: 237 10E9/L (ref 150–450)
PLATELET # BLD AUTO: 237 10E9/L (ref 150–450)
POTASSIUM SERPL-SCNC: 4 MMOL/L (ref 3.4–5.3)
POTASSIUM SERPL-SCNC: 4.2 MMOL/L (ref 3.4–5.3)
PROT SERPL-MCNC: 5.3 G/DL (ref 6.8–8.8)
RBC # BLD AUTO: 2.25 10E12/L (ref 4.4–5.9)
RBC # BLD AUTO: 2.37 10E12/L (ref 4.4–5.9)
RBC # BLD AUTO: 2.42 10E12/L (ref 4.4–5.9)
SODIUM SERPL-SCNC: 126 MMOL/L (ref 133–144)
SODIUM SERPL-SCNC: 126 MMOL/L (ref 133–144)
UFH PPP CHRO-ACNC: 0.36 IU/ML
WBC # BLD AUTO: 7.4 10E9/L (ref 4–11)
WBC # BLD AUTO: 7.9 10E9/L (ref 4–11)
WBC # BLD AUTO: 8 10E9/L (ref 4–11)

## 2021-03-02 PROCEDURE — 93005 ELECTROCARDIOGRAM TRACING: CPT

## 2021-03-02 PROCEDURE — 250N000011 HC RX IP 250 OP 636: Performed by: STUDENT IN AN ORGANIZED HEALTH CARE EDUCATION/TRAINING PROGRAM

## 2021-03-02 PROCEDURE — 85610 PROTHROMBIN TIME: CPT

## 2021-03-02 PROCEDURE — 85027 COMPLETE CBC AUTOMATED: CPT | Performed by: INTERNAL MEDICINE

## 2021-03-02 PROCEDURE — 250N000013 HC RX MED GY IP 250 OP 250 PS 637: Performed by: STUDENT IN AN ORGANIZED HEALTH CARE EDUCATION/TRAINING PROGRAM

## 2021-03-02 PROCEDURE — 80048 BASIC METABOLIC PNL TOTAL CA: CPT | Performed by: INTERNAL MEDICINE

## 2021-03-02 PROCEDURE — 36415 COLL VENOUS BLD VENIPUNCTURE: CPT | Performed by: STUDENT IN AN ORGANIZED HEALTH CARE EDUCATION/TRAINING PROGRAM

## 2021-03-02 PROCEDURE — 250N000013 HC RX MED GY IP 250 OP 250 PS 637

## 2021-03-02 PROCEDURE — 90686 IIV4 VACC NO PRSV 0.5 ML IM: CPT | Performed by: INTERNAL MEDICINE

## 2021-03-02 PROCEDURE — 99233 SBSQ HOSP IP/OBS HIGH 50: CPT | Mod: GC | Performed by: INTERNAL MEDICINE

## 2021-03-02 PROCEDURE — 250N000011 HC RX IP 250 OP 636: Performed by: INTERNAL MEDICINE

## 2021-03-02 PROCEDURE — G0008 ADMIN INFLUENZA VIRUS VAC: HCPCS | Performed by: INTERNAL MEDICINE

## 2021-03-02 PROCEDURE — 93010 ELECTROCARDIOGRAM REPORT: CPT | Performed by: INTERNAL MEDICINE

## 2021-03-02 PROCEDURE — 250N000013 HC RX MED GY IP 250 OP 250 PS 637: Performed by: INTERNAL MEDICINE

## 2021-03-02 PROCEDURE — 80053 COMPREHEN METABOLIC PANEL: CPT | Performed by: INTERNAL MEDICINE

## 2021-03-02 PROCEDURE — 74177 CT ABD & PELVIS W/CONTRAST: CPT

## 2021-03-02 PROCEDURE — 250N000011 HC RX IP 250 OP 636

## 2021-03-02 PROCEDURE — 83735 ASSAY OF MAGNESIUM: CPT | Performed by: INTERNAL MEDICINE

## 2021-03-02 PROCEDURE — 85018 HEMOGLOBIN: CPT | Performed by: STUDENT IN AN ORGANIZED HEALTH CARE EDUCATION/TRAINING PROGRAM

## 2021-03-02 PROCEDURE — 36415 COLL VENOUS BLD VENIPUNCTURE: CPT | Performed by: INTERNAL MEDICINE

## 2021-03-02 PROCEDURE — 85520 HEPARIN ASSAY: CPT | Performed by: STUDENT IN AN ORGANIZED HEALTH CARE EDUCATION/TRAINING PROGRAM

## 2021-03-02 PROCEDURE — 36592 COLLECT BLOOD FROM PICC: CPT

## 2021-03-02 PROCEDURE — 84132 ASSAY OF SERUM POTASSIUM: CPT | Performed by: INTERNAL MEDICINE

## 2021-03-02 PROCEDURE — 85027 COMPLETE CBC AUTOMATED: CPT

## 2021-03-02 PROCEDURE — 250N000009 HC RX 250: Performed by: INTERNAL MEDICINE

## 2021-03-02 PROCEDURE — 97535 SELF CARE MNGMENT TRAINING: CPT | Mod: GO

## 2021-03-02 PROCEDURE — 97165 OT EVAL LOW COMPLEX 30 MIN: CPT | Mod: GO

## 2021-03-02 PROCEDURE — 74177 CT ABD & PELVIS W/CONTRAST: CPT | Mod: 26 | Performed by: RADIOLOGY

## 2021-03-02 PROCEDURE — 214N000001 HC R&B CCU UMMC

## 2021-03-02 PROCEDURE — 85730 THROMBOPLASTIN TIME PARTIAL: CPT | Performed by: INTERNAL MEDICINE

## 2021-03-02 PROCEDURE — 36592 COLLECT BLOOD FROM PICC: CPT | Performed by: INTERNAL MEDICINE

## 2021-03-02 PROCEDURE — C9113 INJ PANTOPRAZOLE SODIUM, VIA: HCPCS | Performed by: STUDENT IN AN ORGANIZED HEALTH CARE EDUCATION/TRAINING PROGRAM

## 2021-03-02 RX ORDER — PANTOPRAZOLE SODIUM 40 MG/1
40 TABLET, DELAYED RELEASE ORAL
Status: DISCONTINUED | OUTPATIENT
Start: 2021-03-02 | End: 2021-03-10 | Stop reason: HOSPADM

## 2021-03-02 RX ORDER — HYDROMORPHONE HYDROCHLORIDE 1 MG/ML
0.3 INJECTION, SOLUTION INTRAMUSCULAR; INTRAVENOUS; SUBCUTANEOUS ONCE
Status: COMPLETED | OUTPATIENT
Start: 2021-03-02 | End: 2021-03-02

## 2021-03-02 RX ORDER — FUROSEMIDE 40 MG
80 TABLET ORAL
Status: DISCONTINUED | OUTPATIENT
Start: 2021-03-03 | End: 2021-03-04

## 2021-03-02 RX ORDER — WARFARIN SODIUM 3 MG/1
3 TABLET ORAL
Status: COMPLETED | OUTPATIENT
Start: 2021-03-02 | End: 2021-03-02

## 2021-03-02 RX ORDER — LISINOPRIL 2.5 MG/1
2.5 TABLET ORAL DAILY
Status: DISCONTINUED | OUTPATIENT
Start: 2021-03-02 | End: 2021-03-05

## 2021-03-02 RX ORDER — HEPARIN SODIUM 10000 [USP'U]/100ML
0-5000 INJECTION, SOLUTION INTRAVENOUS CONTINUOUS
Status: DISCONTINUED | OUTPATIENT
Start: 2021-03-02 | End: 2021-03-04

## 2021-03-02 RX ORDER — HEPARIN SODIUM,PORCINE 10 UNIT/ML
3 VIAL (ML) INTRAVENOUS
Status: DISCONTINUED | OUTPATIENT
Start: 2021-03-02 | End: 2021-03-10 | Stop reason: HOSPADM

## 2021-03-02 RX ORDER — IOPAMIDOL 755 MG/ML
100 INJECTION, SOLUTION INTRAVASCULAR ONCE
Status: COMPLETED | OUTPATIENT
Start: 2021-03-02 | End: 2021-03-02

## 2021-03-02 RX ORDER — HYDROMORPHONE HCL IN WATER/PF 6 MG/30 ML
0.2 PATIENT CONTROLLED ANALGESIA SYRINGE INTRAVENOUS ONCE
Status: COMPLETED | OUTPATIENT
Start: 2021-03-02 | End: 2021-03-02

## 2021-03-02 RX ORDER — OXYCODONE HYDROCHLORIDE 5 MG/1
5 TABLET ORAL EVERY 4 HOURS PRN
Status: DISCONTINUED | OUTPATIENT
Start: 2021-03-02 | End: 2021-03-10 | Stop reason: HOSPADM

## 2021-03-02 RX ADMIN — THIAMINE HCL TAB 100 MG 100 MG: 100 TAB at 08:06

## 2021-03-02 RX ADMIN — INFLUENZA A VIRUS A/GUANGDONG-MAONAN/SWL1536/2019 CNIC-1909 (H1N1) ANTIGEN (FORMALDEHYDE INACTIVATED), INFLUENZA A VIRUS A/HONG KONG/2671/2019 (H3N2) ANTIGEN (FORMALDEHYDE INACTIVATED), INFLUENZA B VIRUS B/PHUKET/3073/2013 ANTIGEN (FORMALDEHYDE INACTIVATED), AND INFLUENZA B VIRUS B/WASHINGTON/02/2019 ANTIGEN (FORMALDEHYDE INACTIVATED) 0.5 ML: 15; 15; 15; 15 INJECTION, SUSPENSION INTRAMUSCULAR at 10:02

## 2021-03-02 RX ADMIN — HYDROMORPHONE HYDROCHLORIDE 0.2 MG: 0.2 INJECTION, SOLUTION INTRAMUSCULAR; INTRAVENOUS; SUBCUTANEOUS at 01:29

## 2021-03-02 RX ADMIN — PANTOPRAZOLE SODIUM 40 MG: 40 INJECTION, POWDER, FOR SOLUTION INTRAVENOUS at 08:12

## 2021-03-02 RX ADMIN — CEFTRIAXONE 1 G: 1 INJECTION, POWDER, FOR SOLUTION INTRAMUSCULAR; INTRAVENOUS at 08:13

## 2021-03-02 RX ADMIN — WARFARIN SODIUM 3 MG: 3 TABLET ORAL at 18:07

## 2021-03-02 RX ADMIN — HEPARIN SODIUM 1150 UNITS/HR: 10000 INJECTION, SOLUTION INTRAVENOUS at 18:38

## 2021-03-02 RX ADMIN — FOLIC ACID 1 MG: 1 TABLET ORAL at 08:06

## 2021-03-02 RX ADMIN — FUROSEMIDE 40 MG: 40 TABLET ORAL at 08:03

## 2021-03-02 RX ADMIN — MULTIPLE VITAMINS W/ MINERALS TAB 1 TABLET: TAB at 08:00

## 2021-03-02 RX ADMIN — PANTOPRAZOLE SODIUM 40 MG: 40 TABLET, DELAYED RELEASE ORAL at 15:38

## 2021-03-02 RX ADMIN — NICOTINE 7 MG/24 HR DAILY TRANSDERMAL PATCH 1 PATCH: at 08:06

## 2021-03-02 RX ADMIN — SPIRONOLACTONE 25 MG: 25 TABLET, FILM COATED ORAL at 08:01

## 2021-03-02 RX ADMIN — IOPAMIDOL 100 ML: 755 INJECTION, SOLUTION INTRAVENOUS at 02:53

## 2021-03-02 RX ADMIN — FUROSEMIDE 40 MG: 40 TABLET ORAL at 15:39

## 2021-03-02 RX ADMIN — SODIUM CHLORIDE, PRESERVATIVE FREE 100 ML: 5 INJECTION INTRAVENOUS at 02:54

## 2021-03-02 RX ADMIN — HYDROMORPHONE HYDROCHLORIDE 0.3 MG: 1 INJECTION, SOLUTION INTRAMUSCULAR; INTRAVENOUS; SUBCUTANEOUS at 02:06

## 2021-03-02 RX ADMIN — ACETAMINOPHEN 650 MG: 325 TABLET, FILM COATED ORAL at 04:27

## 2021-03-02 RX ADMIN — MELATONIN TAB 3 MG 3 MG: 3 TAB at 21:34

## 2021-03-02 ASSESSMENT — ACTIVITIES OF DAILY LIVING (ADL)
ADLS_ACUITY_SCORE: 15
ADLS_ACUITY_SCORE: 15
ADLS_ACUITY_SCORE: 16
ADLS_ACUITY_SCORE: 15

## 2021-03-02 ASSESSMENT — MIFFLIN-ST. JEOR: SCORE: 1565.83

## 2021-03-02 NOTE — PLAN OF CARE
Shift: [id number]    Status: Transferred from OSH on 2/26 for cardiogenic shock, now pending outpatient transplant/LVAD work-up.    Neuro: Alert and oriented x4, able to make needs known, calls appropriately. Irritable, frustrated at times with his pain, seemed to get better as the shift went on.   Cardiac/VS: Sinus tachycardia 110-119, SBP 's, DPB 60's-80's, BLE 1-2+ dependent edema, distended abdominal veins, Bilateral JVD  Respiratory: RA, denies SOB/MCCORMICK  GI: intermittent nausea related to pain   Diet/Appetite: 2g Na cardiac diet, 1.8L FR (poor compliance), limiting intake in preparation for possible procedure.   : Voids small amounts of marlen urine, using urinal   Activity: SBA   Pain: Reporting severe (10/10) stabbing mid-lower back pain that was different than previous bouts of pain, gave 0.2 IV dilaudid and then 30 minutes later, gave another 0.3mg IV dilaudid with very mild relief (8-9/10). Utilizing aqua-K heating pad, pt unsure if it is providing relief.  Skin: dependent edema as noted above, distended vasculature as noted above, protuberant and firm abdomen   LDA(s): R PICC (red and purple lumen both with blood return) SL x2, R PIV SL    ELECTROLYTE REPLACEMENT ORDER SETS    -High potassium replacement protocol    -3.5 at 2015 on 3/1, recheck in AM    -High magnesium replacement protocol    -2.3 at 0325 on 3/1, recheck in AM      Changes this shift:   -STAT EKG done at 0142, no significant change  -Hgb 8.2 at 2015, down to 7.7 at 0152  -STAT abdominal CT at 0310 with severe ascites, no signs of retroperitoneal bleed.     -Consider Psych consult       Plan: Continue plan of care.

## 2021-03-02 NOTE — PROGRESS NOTES
03/02/21 0900   Quick Adds   Type of Visit Initial Occupational Therapy Evaluation       Present no   Language english   Living Environment   People in home significant other   Current Living Arrangements house   Home Accessibility stairs within home   Number of Stairs, Within Home, Primary other (see comments)  (one flight upstairs and one flight downstairs)   Stair Railings, Within Home, Primary railings on both sides of stairs   Transportation Anticipated family or friend will provide   Living Environment Comments Pt lives in a house with his fiance. Pt has a walk in shower in the bathroom with grab bars   Self-Care   Usual Activity Tolerance good   Current Activity Tolerance moderate   Regular Exercise No   Equipment Currently Used at Home none   Activity/Exercise/Self-Care Comment Pt was previously independent with ADL completion   Disability/Function   Hearing Difficulty or Deaf no   Wear Glasses or Blind no   Concentrating, Remembering or Making Decisions Difficulty no   Difficulty Communicating no   Difficulty Eating/Swallowing no   Walking or Climbing Stairs Difficulty no   Dressing/Bathing Difficulty no   Toileting issues no   Doing Errands Independently Difficulty (such as shopping) yes   Errands Management Pt fiance assists with IADL completion   Fall history within last six months no   Change in Functional Status Since Onset of Current Illness/Injury yes   General Information   Onset of Illness/Injury or Date of Surgery 02/26/21   Referring Physician Dick Dorman MD   Patient/Family Therapy Goal Statement (OT) To return home   Additional Occupational Profile Info/Pertinent History of Current Problem Miguel Ángel Henson is a 35 year old male with a PMHx of NICM (LVEF 15%) 2/2 to EtOH abuse, subcutaneous ICD with hx of VT and tobacco abuse who presented to OSH 2/17/20 profoundly volume overloaded with acute on chronic HFrEF, cardiogenic shock and AC requiring transfer to [facility]  2/26/2021 for consideration of advanced therapies. Course complicated by UGIB due to esophagitis.   Existing Precautions/Restrictions no known precautions/restrictions   Left Upper Extremity (Weight-bearing Status) full weight-bearing (FWB)   Right Upper Extremity (Weight-bearing Status) full weight-bearing (FWB)   Left Lower Extremity (Weight-bearing Status) full weight-bearing (FWB)   Right Lower Extremity (Weight-bearing Status) full weight-bearing (FWB)   Heart Disease Risk Factors Medical history   General Observations and Info Activity: up ad otto   Cognitive Status Examination   Orientation Status orientation to person, place and time   Affect/Mental Status (Cognitive) WNL   Follows Commands WNL   Cognitive Status Comments Pt is alert, oriented and generally appropriate in conversation. Administered the SLUMS and pt scored 21/30, with 27/30 as normal. Will continue to monitor cognition   Visual Perception   Visual Impairment/Limitations WNL   Sensory   Sensory Quick Adds No deficits were identified   Integumentary/Edema   Integumentary/Edema Comments Pt with slight BLE edema   Range of Motion Comprehensive   General Range of Motion no range of motion deficits identified   Strength Comprehensive (MMT)   General Manual Muscle Testing (MMT) Assessment no strength deficits identified   Bed Mobility   Bed Mobility supine-sit   Supine-Sit [place] (Bed Mobility) independent   Transfers   Transfers sit-stand transfer;toilet transfer   Sit-Stand Transfer   Sit-Stand [place] (Transfers) independent   Toilet Transfer   [place] Level (Toilet Transfer) independent   Activities of Daily Living   BADL Assessment/Intervention lower body dressing;upper body dressing;grooming;toileting   Upper Body Dressing Assessment/Training   [place] Level (Upper Body Dressing) independent   Lower Body Dressing Assessment/Training   [place] Level (Lower Body Dressing) independent   Grooming Assessment/Training    [place] Level (Grooming) independent   Toileting   [place] Level (Toileting) independent   Instrumental Activities of Daily Living (IADL)   IADL Comments Pt fiance assists with IADL completion   Clinical Impression   Criteria for Skilled Therapeutic Interventions Met (OT) yes;meets criteria;skilled treatment is necessary   OT Diagnosis decreased activity tolerance and independence with ADL completion   OT Problem List-Impairments impacting ADL activity tolerance impaired;cognition;strength   Assessment of Occupational Performance 5 or more Performance Deficits   Identified Performance Deficits g/h, toileting, bathing, dressing, functional mobility and cognition   Planned Therapy Interventions (OT) IADL retraining;ADL retraining;cognition;strengthening;home program guidelines;progressive activity/exercise   Clinical Decision Making Complexity (OT) low complexity   Therapy Frequency (OT) 5x/week   Predicted Duration of Therapy 1 week   Anticipated Equipment Needs Upon Discharge (OT) other (see comments)  (TBD)   Risk & Benefits of therapy have been explained evaluation/treatment results reviewed;care plan/treatment goals reviewed;risks/benefits reviewed;current/potential barriers reviewed;participants voiced agreement with care plan;participants included;patient   OT Discharge Planning    OT Discharge Recommendation (DC Rec) Home with assist;home with outpatient cardiac rehab   OT Rationale for DC Rec Pt is primarily SBA/Independent with ADL completion and functional mobility. Pt would benefit from resumption of OP CR at discharge to increase exercise endurance   OT Brief overview of current status  SBA for ambulation   Total Evaluation Time (Minutes)   Total Evaluation Time (Minutes) 7

## 2021-03-02 NOTE — PROVIDER NOTIFICATION
03/01/21 2045   Valuables   Patient Belongings remains with patient   Patient Belongings Remaining with Patient cash/credit card;cell phone/electronics;glasses;wallet;vision aids   Did you bring any home meds/supplements to the hospital?  Yes   Disposition of meds  Sent Home  (previously sent home with pt's S/O Teresa)     Transferred to: Unit 6C Room 19 bed 1 at (time)  Belongings:   [place] removed? Yes  Central line removed? No, PICC remains in place.  Chart and medications sent with patient Yes  Family notified: No: Pt will notify.    Pt transferred with RN, on monitor.  Belongs transferred as annotated above.    Tess Bhatt RN on 3/1/2021 at 8:50 PM

## 2021-03-02 NOTE — PHARMACY-ANTICOAGULATION SERVICE
Clinical Pharmacy - Warfarin Dosing Consult     Pharmacy has been consulted to manage this patient s warfarin therapy.  Indication: (afib/LV thrombus)  Therapy Goal: INR 2-3  Provider/Team: Cards 2  Warfarin Prior to Admission: No    INR   Date Value Ref Range Status   03/02/2021 1.15 (H) 0.86 - 1.14 Final   02/26/2021 1.16 (H) 0.86 - 1.14 Final       Recommend warfarin 3 mg today.  Pharmacy will monitor Miguel Ángel Henson daily and order warfarin doses to achieve specified goal.      Please contact pharmacy as soon as possible if the warfarin needs to be held for a procedure or if the warfarin goals change.

## 2021-03-02 NOTE — PROGRESS NOTES
[facility]    Cardiology Progress Note- Cards 2        Date of Admission:  2/26/2021     Assessment & Plan: HVSL   Miguel Ángel Henson is a 35 year old male with a PMHx of NICM (LVEF 15%) 2/2 to EtOH abuse, subcutaneous ICD with hx of VT and tobacco abuse who presented to OSH 2/17/20 profoundly volume overloaded with acute on chronic HFrEF, cardiogenic shock and AC requiring transfer to [facility] 2/26/2021 for consideration of advanced therapies. Course complicated by UGIB due to esophagitis.     Today's Plan:  - switch to PO protonix BID  - final dose of Ceftriaxone to finish 72hrs of coverage  - start lisinopril 2.5mg daily  - increase furosemide to 80mg BID  - start warfarin  - will discuss with social work to arrange transfer back to [facility]   - follow-up liver biopsy pathology     ---Neuro---  # Category 2 Substance Abuse  - appreciate neuropsych evaluation  - recommend formal chemical dependency assessment  - recommend establishing care with a local psychiatrist or psychologist      ---Cardiovascular---  # Dilated NICM (LVEF 15%, LVIDd 7.7 cm) 2/2 to EtOH   # Acute on chronic HFrEF, NYHA class IV   # Cardiogenic shock   # Medication/clinic follow-up non compliance   Presented to OSH with marked volume overload and cardiogenic shock requiring multiple pressors. Decompensated HF is in setting of medication non-compliance. On admit to [facility], CI is 2.2 (on dobutamine 3 mcg/kg/min, NE 0.06 and vaso 2.4) with CVP of 8 (small collapsible IVC) after significant volume removal (70 lbs) at OSH. Consideration of advanced therapies likely limited by current tobacco use, EtOH abuse, and medication/follow-up noncompliance.   TTE 2/27: LVEF 5-10%, LVIDd 8.97 cm, LV apical thrombus, mod-sev MR, mod-sev TR Mild RV dysfunction,  RHC 2/27: RA 6, RV 31/6, PA 31/16 (22), PCWP 14, Luis 5.2/3.2, TD 5.4/3.3, SVR 1060, PVR 1.5 on dobutamine 3, vaso 2.4, norepi 0.02  [facility]  : CVP 3, PA 28/17 (24), PCWP 12, SvO2 60, CI 2.05, SVR 1634 on vaso 1, norepi 0.06,  2  [place] numbers  CVP 13, PA 36/24, PCWP 16, SvO2 43, CI/CO 2.1/3.6, SVR 1622  -off dobutamine and pressors  -volume: hypervolemic, lasix 80mg po BID with gaol negative 500cc  -spironolactone 25mg daily  -PEth: negative  -neuropsych evaluation: completed  -cirrhosis w/u as below      # LV apical thrombus  - resume heparin gtt  - start warfarin     # Subcutaneous ICD with hx of VT  -ICD check unremarkable     ---Respiratory---  # Acute hypoxic respiratory failure 2/2 to CHF/atelectasis, improved   -supplemental O2 as needed to maintain SpO2>92%     # Tobacco abuse  -strict cessation encouraged   - nicotine patch     ---Renal---  # Hyponatremia, 2/2 to volume overload; significantly improved from presentation  -trend BMP      # Diuretic induced hypokalemia  - replacement per unit protocol  - spironolactone     ---GI---  # Portal hypertension   # Ascites s/p multiple paracentesis   # Hx alcohol abuse with alcoholic pancreatis/hepatitis    No cirrhosis per reports on outside imaging. Ascites felt to be 2/2 to CHF as above. As status of liver will impact advanced therapies discussion.  -PEth as above   -trend LFTs  - appreciate hepatology consultation: transhepatic pressures normal, transjugular biopsy results pending  -on furosemide/spironolactone     # Upper GIB  Patient with coffee ground emesis. Hb 11 > 8. S/p EGD which showed esophagitis, friable mucosa. No evidence of varices.   - 72hrs ceftriaxone for SBP prophylaxis  - continue pantoprazole 40mg bid, switch to oral  - hemoglobin checks BID     ---ID---  No acute issues, no growth on cx from paracentesis   -covid negative   -CTX for SBP prophylaxis     ---Endocrine--   No acute issues     ---Hematology---  # Acute Blood Loss Anemia 2/2 UGIB  - transfuse for Hgb<7    ---Nutrition--   #  Severe Malnutrition  - nutrition consult  - multivitamin    ---Dermatologic--   #  Extremity Wounds  - care per WOC recommendations    FEN: 2g Na, 1800cc fluid restricted   DVT Prophylaxis: Heparin gtt  Code Status: Full     Entered: Renzo Dorman MD 03/02/2021, 7:48 AM       The patient's care was discussed with the Attending Physician, Dr. Pratt, Bedside Nurse and Patient.    Renzo Dorman MD   Cardiology Fellow  [facility] [id number]  [facility]  Please see sign in/sign out for up to date coverage information  ______________________________________________________________________    Interval History   Liver biopsy obtained. Transferred out of ICU. Transitioned to PO diuretics. Overnight had new back pain for which CT was obtained which showed no acute pathology to explain the pain. Back pain much improved this morning. No shortness of breath or chest pain. No emesis or nausea.    Data reviewed today: I reviewed all medications, new labs and imaging results over the last 24 hours.     Physical Exam   Vital Signs: Temp: 98.1  F (36.7  C) Temp src: Oral BP: 95/67 Pulse: 105   Resp: 16 SpO2: 94 % O2 Device: None (Room air)    Weight: 139 lbs 14.4 oz  Constitutional: awake, alert, agitated, no apparent distress, cachectic in appearance  Eyes: Nonicteric, pupils equal  HENT: NCAT, temporal wasting  Respiratory: Nonlabored,  Decreased air movement over the b/l bases, no crackles or wheezes  Cardiovascular: RRR,  III/VI Holosystolic blowing murmur best appreciated over apex, parasternal heave, laterally displaced PMI  GI: Distended abdomen, nonrigid, NT. + fluid wave  Skin: warm and dry, no rash  Musculoskeletal: thin extremities, wwp, no peripheral edema  Neurologic: AAOx3, CN grossly intact, spontaneous movement of all extremities    Data   Recent Labs   Lab 03/02/21  1641 03/02/21  1504 03/02/21  0537 03/02/21  0152 03/01/21 2015 03/01/21  1235 03/01/21  0325 03/01/21  0325 02/26/21  0030 02/26/21  0030   WBC  --  8.0 7.4  --  6.8 6.0   < > 5.4   < > 6.6   HGB   --  7.7* 7.6* 7.7* 8.2* 7.8*   < > 7.1*   < > 12.0*   MCV  --  102* 100  --  100 96   < > 98   < > 97   PLT  --  237 221  --  204 166   < > 152   < > 150   INR 1.15*  --   --   --   --   --   --   --   --  1.16*   NA  --  126* 126*  --   --  131*   < > 130*   < > 128*   POTASSIUM  --  4.2 4.0  --  3.5 3.4   < > 3.1*   < > 3.4   CHLORIDE  --  93* 93*  --   --  99   < > 97   < > 77*   CO2  --  25 25  --   --  26   < > 28   < > >45*   BUN  --  21 22  --   --  26   < > 31*   < > 23   CR  --  0.71 0.76  --   --  0.62*   < > 0.62*   < > 0.91   ANIONGAP  --  8 8  --   --  6   < > 4   < > Not Calculated   ALEKSEY  --  8.1* 7.8*  --   --  7.8*   < > 7.4*   < > 9.1   GLC  --  102* 118*  --   --  95   < > 99   < > 131*   ALBUMIN  --   --  2.6*  --   --   --   --  2.4*   < > 3.1*   PROTTOTAL  --   --  5.3*  --   --   --   --  4.6*   < > 5.8*   BILITOTAL  --   --  1.7*  --   --   --   --  1.9*   < > 3.7*   ALKPHOS  --   --  182*  --   --   --   --  106   < > 128   ALT  --   --  33  --   --   --   --  20   < > 19   AST  --   --  49*  --   --   --   --  34   < > 31    < > = values in this interval not displayed.     Recent Results (from the past 24 hour(s))   CT Abdomen Pelvis w Contrast    Narrative    EXAMINATION: CT ABDOMEN PELVIS W CONTRAST, 3/2/2021 3:10 AM    TECHNIQUE:  Helical CT images from the lung bases through the  symphysis pubis were obtained with and without contrast.  Coronal and  sagittal reformatted images were generated at a workstation for  further assessment.    CONTRAST:  100 ml Isovue 370.    COMPARISON: IR fluoroscopy 3/1/2021, abdominal ultrasound 2/26/2021    HISTORY: Pt s/p transjugular liver bx on 3/1 now with sudden severe  lower back pain, concern for RPB    FINDINGS:    Liver: No suspicious liver lesions. Heterogeneous enhancement of the  hepatic parenchyma. Portal veins appear patent.  Gallbladder: No calcified gallstones. No intra- or extra-hepatic  biliary ductal dilatation.  Spleen: Borderline  splenomegaly.  Pancreas: No suspicious pancreatic lesions. The pancreatic duct is not  dilated.  Adrenal glands: No adrenal nodules.  Kidneys: No hydronephrosis or obstructing renal stones.  Bladder / Pelvic organs: Bladder is within normal limits. No pelvic  masses.  Bowel: No abnormally distended loops of large or small bowel. The  appendix is unremarkable. Diffusely thickened small and large bowel  without abnormal enhancement or other evidence of bowel complication.  Lymph nodes: No retroperitoneal, mesenteric, or pelvic  lymphadenopathy.  Peritoneum / Retroperitoneum: No pneumoperitoneum. Large abdominal  ascites. Mild omental nodularity/edema is seen within the left upper  quadrant (series 8, image 175).  Abdominal vasculature: Major vascular structures of the abdomen are  patent and normal in caliber. Significant enlargement of the IVC and  iliac veins.    Bones and soft tissues: Mild degenerative changes of the visualized  spine. Chronic appearing right posterior rib fracture deformities. No  acute osseous abnormalities or suspicious bony lesions. Scattered  sclerotic foci involving the pelvis and right femur, likely bone  islands. Diffuse anasarca. Left chest wall cardiac device. Left  inguinal hernia containing fluid.    Lower thorax: Moderate right pleural effusion. Bibasilar atelectasis.  Cardiomegaly. No significant pericardial effusion. Small hiatal hernia  with esophageal wall thickening and fluid seen within the distal  esophagus.      Impression    IMPRESSION:   1. No evidence of retroperitoneal bleed or other acute complication in  the abdomen/pelvis.  2. Cardiomegaly with large intra-abdominal ascites, moderate right  pleural effusion, diffuse mild anasarca, and IVC distention/dilation,  consistent with sequela of cardiac dysfunction.   3. Diffusely thickened small and large bowel without abnormal  enhancement or other evidence of bowel complication, likely sequela of  the above  dysfunction/reactive to large volume ascites.  4. Heterogeneous enhancement of the hepatic parenchyma, which may  reflect intrinsic parenchymal disease. Correlate with clinical  symptoms and laboratory findings.    I have personally reviewed the examination and initial interpretation  and I agree with the findings.    YANICK VALLE MD     Medications     - MEDICATION INSTRUCTIONS -       - MEDICATION INSTRUCTIONS -         alteplase  1 mg Intracatheter Once     folic acid  1 mg Oral Daily     [START ON 3/3/2021] furosemide  80 mg Oral BID     lidocaine  1 patch Transdermal Q24H     lidocaine   Transdermal Q8H     lisinopril  2.5 mg Oral Daily     menthol   Transdermal Q8H     multivitamin w/minerals  1 tablet Oral Daily     nicotine  1 patch Transdermal Daily     nicotine   Transdermal Q8H     pantoprazole  40 mg Oral BID AC     sodium chloride (PF)  10 mL Intravenous Once     spironolactone  25 mg Oral Daily     vitamin B1  100 mg Oral Daily

## 2021-03-02 NOTE — PLAN OF CARE
Neuro: alox4  Cardiac: SR  Respiratory: MCCORMICK. LSC. On RA  GI/: Abd-distended. Adequate UO.   Diet/appetite: cardiac diet. 1.8LFR  Activity: Up with SBA  Pain: c/o generalized cramp. Tylenol and hot pack given.   Skin: pressure injury on Coccyx and RLE. Meplilx  drsg applied to pressure points   LDA's: PIV and PICC SL  Plan:  Continue to monitor.

## 2021-03-02 NOTE — PLAN OF CARE
PT: Per discussion with OT, Physical therapy needs unlikely. Pt mobilizing with SBA. OT to further screen longer distance gait and stairs as appropriate. PT will hold on eval at this time.

## 2021-03-02 NOTE — PROGRESS NOTES
Transfer~2100  Transferred from:4E  Via:bed  Reason for transfer:Pt appropriate for 6C- improved patient condition  Family: Aware of transfer  Belongings: Sent with pt  Chart: Sent with pt  Medications: Meds from bin sent with pt  2RN full skin assessment was done with Dhara FERREIRA RN, pt has pressure injuries in coccyx and R LE.       Pt's belongings that came from 4E with pt are:  A bag of candies  Personal hygrine care items  Cell phone with  and  extension cord

## 2021-03-02 NOTE — PROGRESS NOTES
"CLINICAL NUTRITION SERVICES     Nutrition Prescription    RECOMMENDATIONS FOR MDs/PROVIDERS TO ORDER:  Liberalize diet order to a 3 g sodium diet if inadequate oral intake. Continue fluid restriction as per team.  Monitor need for kcal counts.    Malnutrition Status:    See prior nutrition notes.    Recommendations already ordered by Registered Dietitian (RD):  Multivitamin with minerals.     Future/Additional Recommendations:  See prior nutrition notes.     Diet: 2 g Sodium diet since 3/1 (2 g sodium diet prior but NPO/clear liquids frequently this admission) + 1.8 L fluid restriction. Ordered to receive strawberry or chocolate Ensure Enlive between all meals and string cheese at HS.   Intake: Fair oral intake on 2/27 and then consumed 75% on 3/1. Per discussion with pt, aware of some low-sodium foods but not sure of all foods high in sodium. Pt likes his string cheese snack. Drinking vanilla Ensure Enlive, likes these cold. Pt admits the fluid restriction is difficult to follow.     ASSESSED NUTRITION NEEDS - updated  Dosing Weight: 52 kg (based on weight of 51.5 kg on 2/26)  Estimated Energy Needs: 1550 - 1800+ kcals/day (30 - 35 kcals/kg)  Justification: Increased needs, underweight, and pressure injury  Estimated Protein Needs: 80 - 105 grams protein/day (1.5 - 2 grams of pro/kg)  Justification: Increased needs, repletion, and pt with a pressure injury  Estimated Fluid Needs: 1800 mL/day  Justification: On a fluid restriction, but adequate fluid intake rec with a pressure injury    WO on 2/26: \"R foot wounds due to Venous Ulcer due to significant edema  Status: initial assessment. R ischial tuberosity wounds due to Pressure Injury, present on admission. Pressure injury is stage 2. Status: initial assessment. Coccyx/sacrum with distinct area of minimally blanchable redness, present on admission.\" Ordered to receive a multivitamin with minerals.     INTERVENTIONS:  Implementation:  Medical food supplement " therapy: Discussed oral supplements, including Arginaid Extra and Magic Cup. Pt would like to continue his current oral supplement schedule. Encouraged pt to consume his oral supplements daily. Pt states he may possibly purchase these once discharged.  Nutrition education for nutrition relationship to health/disease: Importance of adequate nutrition intake discussed. Encouraged intake of oral supplements. Provided verbal instruction on low-sodium meal planning. Provided the following handouts: How to Read Nutrition Labels, Low-Sodium Foods and Drinks, Tips for a Low-Sodium Diet, Seasoning Your Foods Without Adding Salt, and Managing Fluid Restriction. Provided room service sodium handout. Discussed fluid restriction.    Follow up/Monitoring:  Will continue to follow pt.    Bisi Monroe, MS, RD, LD, [facility]   6C Pgr: [phone]

## 2021-03-03 LAB
ALBUMIN SERPL-MCNC: 2.6 G/DL (ref 3.4–5)
ALP SERPL-CCNC: 186 U/L (ref 40–150)
ALT SERPL W P-5'-P-CCNC: 30 U/L (ref 0–70)
ANION GAP SERPL CALCULATED.3IONS-SCNC: 10 MMOL/L (ref 3–14)
ANION GAP SERPL CALCULATED.3IONS-SCNC: 9 MMOL/L (ref 3–14)
AST SERPL W P-5'-P-CCNC: 37 U/L (ref 0–45)
BILIRUB SERPL-MCNC: 1.9 MG/DL (ref 0.2–1.3)
BUN SERPL-MCNC: 21 MG/DL (ref 7–30)
BUN SERPL-MCNC: 23 MG/DL (ref 7–30)
C DIFF TOX B STL QL: NEGATIVE
CALCIUM SERPL-MCNC: 7.8 MG/DL (ref 8.5–10.1)
CALCIUM SERPL-MCNC: 8.1 MG/DL (ref 8.5–10.1)
CHLORIDE SERPL-SCNC: 94 MMOL/L (ref 94–109)
CHLORIDE SERPL-SCNC: 94 MMOL/L (ref 94–109)
CO2 SERPL-SCNC: 23 MMOL/L (ref 20–32)
CO2 SERPL-SCNC: 24 MMOL/L (ref 20–32)
CREAT SERPL-MCNC: 0.78 MG/DL (ref 0.66–1.25)
CREAT SERPL-MCNC: 0.78 MG/DL (ref 0.66–1.25)
ERYTHROCYTE [DISTWIDTH] IN BLOOD BY AUTOMATED COUNT: 16.7 % (ref 10–15)
ERYTHROCYTE [DISTWIDTH] IN BLOOD BY AUTOMATED COUNT: 16.7 % (ref 10–15)
GFR SERPL CREATININE-BSD FRML MDRD: >90 ML/MIN/{1.73_M2}
GFR SERPL CREATININE-BSD FRML MDRD: >90 ML/MIN/{1.73_M2}
GLUCOSE SERPL-MCNC: 100 MG/DL (ref 70–99)
GLUCOSE SERPL-MCNC: 111 MG/DL (ref 70–99)
HCT VFR BLD AUTO: 22.9 % (ref 40–53)
HCT VFR BLD AUTO: 24.3 % (ref 40–53)
HGB BLD-MCNC: 7.2 G/DL (ref 13.3–17.7)
HGB BLD-MCNC: 7.6 G/DL (ref 13.3–17.7)
INR PPP: 1.19 (ref 0.86–1.14)
LABORATORY COMMENT REPORT: NORMAL
MAGNESIUM SERPL-MCNC: 2 MG/DL (ref 1.6–2.3)
MCH RBC QN AUTO: 31.5 PG (ref 26.5–33)
MCH RBC QN AUTO: 31.7 PG (ref 26.5–33)
MCHC RBC AUTO-ENTMCNC: 31.3 G/DL (ref 31.5–36.5)
MCHC RBC AUTO-ENTMCNC: 31.4 G/DL (ref 31.5–36.5)
MCV RBC AUTO: 101 FL (ref 78–100)
MCV RBC AUTO: 101 FL (ref 78–100)
PLATELET # BLD AUTO: 247 10E9/L (ref 150–450)
PLATELET # BLD AUTO: 256 10E9/L (ref 150–450)
POTASSIUM SERPL-SCNC: 4.1 MMOL/L (ref 3.4–5.3)
POTASSIUM SERPL-SCNC: 4.3 MMOL/L (ref 3.4–5.3)
PROT SERPL-MCNC: 5.5 G/DL (ref 6.8–8.8)
RBC # BLD AUTO: 2.27 10E12/L (ref 4.4–5.9)
RBC # BLD AUTO: 2.41 10E12/L (ref 4.4–5.9)
SARS-COV-2 RNA RESP QL NAA+PROBE: NEGATIVE
SODIUM SERPL-SCNC: 128 MMOL/L (ref 133–144)
SODIUM SERPL-SCNC: 128 MMOL/L (ref 133–144)
SPECIMEN SOURCE: NORMAL
SPECIMEN SOURCE: NORMAL
UFH PPP CHRO-ACNC: 0.26 IU/ML
UFH PPP CHRO-ACNC: 0.47 IU/ML
UFH PPP CHRO-ACNC: 0.58 IU/ML
WBC # BLD AUTO: 6.9 10E9/L (ref 4–11)
WBC # BLD AUTO: 7.2 10E9/L (ref 4–11)

## 2021-03-03 PROCEDURE — 85027 COMPLETE CBC AUTOMATED: CPT | Performed by: INTERNAL MEDICINE

## 2021-03-03 PROCEDURE — 87493 C DIFF AMPLIFIED PROBE: CPT

## 2021-03-03 PROCEDURE — 250N000013 HC RX MED GY IP 250 OP 250 PS 637: Performed by: INTERNAL MEDICINE

## 2021-03-03 PROCEDURE — 250N000013 HC RX MED GY IP 250 OP 250 PS 637: Performed by: STUDENT IN AN ORGANIZED HEALTH CARE EDUCATION/TRAINING PROGRAM

## 2021-03-03 PROCEDURE — 250N000011 HC RX IP 250 OP 636: Performed by: NURSE PRACTITIONER

## 2021-03-03 PROCEDURE — 85610 PROTHROMBIN TIME: CPT | Performed by: INTERNAL MEDICINE

## 2021-03-03 PROCEDURE — 214N000001 HC R&B CCU UMMC

## 2021-03-03 PROCEDURE — U0005 INFEC AGEN DETEC AMPLI PROBE: HCPCS | Performed by: STUDENT IN AN ORGANIZED HEALTH CARE EDUCATION/TRAINING PROGRAM

## 2021-03-03 PROCEDURE — 250N000013 HC RX MED GY IP 250 OP 250 PS 637

## 2021-03-03 PROCEDURE — 250N000011 HC RX IP 250 OP 636

## 2021-03-03 PROCEDURE — U0003 INFECTIOUS AGENT DETECTION BY NUCLEIC ACID (DNA OR RNA); SEVERE ACUTE RESPIRATORY SYNDROME CORONAVIRUS 2 (SARS-COV-2) (CORONAVIRUS DISEASE [COVID-19]), AMPLIFIED PROBE TECHNIQUE, MAKING USE OF HIGH THROUGHPUT TECHNOLOGIES AS DESCRIBED BY CMS-2020-01-R: HCPCS | Performed by: STUDENT IN AN ORGANIZED HEALTH CARE EDUCATION/TRAINING PROGRAM

## 2021-03-03 PROCEDURE — 36592 COLLECT BLOOD FROM PICC: CPT | Performed by: STUDENT IN AN ORGANIZED HEALTH CARE EDUCATION/TRAINING PROGRAM

## 2021-03-03 PROCEDURE — 99233 SBSQ HOSP IP/OBS HIGH 50: CPT | Mod: GC | Performed by: INTERNAL MEDICINE

## 2021-03-03 PROCEDURE — 80048 BASIC METABOLIC PNL TOTAL CA: CPT | Performed by: INTERNAL MEDICINE

## 2021-03-03 PROCEDURE — 36415 COLL VENOUS BLD VENIPUNCTURE: CPT | Performed by: INTERNAL MEDICINE

## 2021-03-03 PROCEDURE — 80053 COMPREHEN METABOLIC PANEL: CPT | Performed by: INTERNAL MEDICINE

## 2021-03-03 PROCEDURE — 250N000011 HC RX IP 250 OP 636: Performed by: INTERNAL MEDICINE

## 2021-03-03 PROCEDURE — 85520 HEPARIN ASSAY: CPT | Performed by: INTERNAL MEDICINE

## 2021-03-03 PROCEDURE — 36592 COLLECT BLOOD FROM PICC: CPT

## 2021-03-03 PROCEDURE — 36592 COLLECT BLOOD FROM PICC: CPT | Performed by: INTERNAL MEDICINE

## 2021-03-03 PROCEDURE — 83735 ASSAY OF MAGNESIUM: CPT | Performed by: INTERNAL MEDICINE

## 2021-03-03 PROCEDURE — 85520 HEPARIN ASSAY: CPT

## 2021-03-03 PROCEDURE — 85520 HEPARIN ASSAY: CPT | Performed by: STUDENT IN AN ORGANIZED HEALTH CARE EDUCATION/TRAINING PROGRAM

## 2021-03-03 RX ORDER — MAGNESIUM SULFATE HEPTAHYDRATE 40 MG/ML
2 INJECTION, SOLUTION INTRAVENOUS ONCE
Status: COMPLETED | OUTPATIENT
Start: 2021-03-03 | End: 2021-03-03

## 2021-03-03 RX ORDER — ONDANSETRON 2 MG/ML
4 INJECTION INTRAMUSCULAR; INTRAVENOUS ONCE
Status: COMPLETED | OUTPATIENT
Start: 2021-03-03 | End: 2021-03-03

## 2021-03-03 RX ORDER — WARFARIN SODIUM 5 MG/1
5 TABLET ORAL
Status: COMPLETED | OUTPATIENT
Start: 2021-03-03 | End: 2021-03-03

## 2021-03-03 RX ORDER — HYDROXYZINE HYDROCHLORIDE 25 MG/1
25 TABLET, FILM COATED ORAL 3 TIMES DAILY PRN
Status: DISCONTINUED | OUTPATIENT
Start: 2021-03-03 | End: 2021-03-10 | Stop reason: HOSPADM

## 2021-03-03 RX ORDER — LOPERAMIDE HCL 2 MG
2 CAPSULE ORAL 4 TIMES DAILY PRN
Status: DISCONTINUED | OUTPATIENT
Start: 2021-03-03 | End: 2021-03-10 | Stop reason: HOSPADM

## 2021-03-03 RX ADMIN — LOPERAMIDE HYDROCHLORIDE 2 MG: 2 CAPSULE ORAL at 16:56

## 2021-03-03 RX ADMIN — FUROSEMIDE 80 MG: 40 TABLET ORAL at 16:25

## 2021-03-03 RX ADMIN — HEPARIN SODIUM 1300 UNITS/HR: 10000 INJECTION, SOLUTION INTRAVENOUS at 09:06

## 2021-03-03 RX ADMIN — Medication 5 ML: at 09:05

## 2021-03-03 RX ADMIN — FOLIC ACID 1 MG: 1 TABLET ORAL at 08:46

## 2021-03-03 RX ADMIN — ONDANSETRON 4 MG: 2 INJECTION INTRAMUSCULAR; INTRAVENOUS at 14:36

## 2021-03-03 RX ADMIN — OXYCODONE HYDROCHLORIDE 5 MG: 5 TABLET ORAL at 16:25

## 2021-03-03 RX ADMIN — PANTOPRAZOLE SODIUM 40 MG: 40 TABLET, DELAYED RELEASE ORAL at 08:46

## 2021-03-03 RX ADMIN — MELATONIN TAB 3 MG 3 MG: 3 TAB at 22:02

## 2021-03-03 RX ADMIN — OXYCODONE HYDROCHLORIDE 5 MG: 5 TABLET ORAL at 20:27

## 2021-03-03 RX ADMIN — LIDOCAINE 1 PATCH: 560 PATCH PERCUTANEOUS; TOPICAL; TRANSDERMAL at 09:01

## 2021-03-03 RX ADMIN — ACETAMINOPHEN 650 MG: 325 TABLET, FILM COATED ORAL at 20:26

## 2021-03-03 RX ADMIN — OXYCODONE HYDROCHLORIDE 5 MG: 5 TABLET ORAL at 03:18

## 2021-03-03 RX ADMIN — MAGNESIUM SULFATE HEPTAHYDRATE 2 G: 40 INJECTION, SOLUTION INTRAVENOUS at 14:36

## 2021-03-03 RX ADMIN — HYDROXYZINE HYDROCHLORIDE 25 MG: 25 TABLET, FILM COATED ORAL at 22:01

## 2021-03-03 RX ADMIN — PANTOPRAZOLE SODIUM 40 MG: 40 TABLET, DELAYED RELEASE ORAL at 16:25

## 2021-03-03 RX ADMIN — FUROSEMIDE 80 MG: 40 TABLET ORAL at 08:46

## 2021-03-03 RX ADMIN — Medication 3 ML: at 16:25

## 2021-03-03 RX ADMIN — NICOTINE 7 MG/24 HR DAILY TRANSDERMAL PATCH 1 PATCH: at 08:48

## 2021-03-03 RX ADMIN — SPIRONOLACTONE 25 MG: 25 TABLET, FILM COATED ORAL at 08:46

## 2021-03-03 RX ADMIN — ALTEPLASE 1 MG: 2.2 INJECTION, POWDER, LYOPHILIZED, FOR SOLUTION INTRAVENOUS at 00:10

## 2021-03-03 RX ADMIN — WARFARIN SODIUM 5 MG: 5 TABLET ORAL at 17:52

## 2021-03-03 RX ADMIN — THIAMINE HCL TAB 100 MG 100 MG: 100 TAB at 08:46

## 2021-03-03 RX ADMIN — MULTIPLE VITAMINS W/ MINERALS TAB 1 TABLET: TAB at 08:46

## 2021-03-03 ASSESSMENT — ACTIVITIES OF DAILY LIVING (ADL)
ADLS_ACUITY_SCORE: 15
ADLS_ACUITY_SCORE: 17
ADLS_ACUITY_SCORE: 15
ADLS_ACUITY_SCORE: 17
ADLS_ACUITY_SCORE: 15
ADLS_ACUITY_SCORE: 17

## 2021-03-03 ASSESSMENT — MIFFLIN-ST. JEOR: SCORE: 1587.15

## 2021-03-03 NOTE — PROGRESS NOTES
Severe fibrosis/cardiac cirrhosis.   Cannot rule out a component of alcohol contribution given his significant alcohol history (AUD, AH, alcoholic pancreatitis, NICM 2/2 ETOH)    Many reports of heart-alone transplant in patients with cardiogenic cirrhosis with success and improvement in liver fibrosis; these are usually subsets of patients (eg Fontan) who may have different physiology.  There are no clear data to guide the impact/utility of portal pressure measurements in the outcomes after heart-alone.     No way to definitively state his liver will have improvement after heart transplant or the impact of recovery/progression with ongoing alcohol use.    Discussed with Dr. Pratt.  Yeny Encarnacion MD    Hepatology/Liver Transplant  Medical Director, Liver Transplantation  [facility]    Appointments [phone]  Clinic Fax [phone]  Transplant Care [phone] Option 4  Transplant Fax [phone]  Administrative Office [phone]  Administrative Fax [phone]  ============================================================    HVPG 5    Liver, Needle Biopsy   Chronic congestive hepatopathy, with severe fibrosis (cardiac cirrhosis); no evidence of recent necrosis from shock apparent.    COMMENT:   The liver biopsy, with by usual criteria is adequate for examination demonstrates extensive perivenular (zone 3) fibrosis and subsinusoidal fibrosis, highlighted by trichrome and reticulin stains. Residual congestion at the expanding edges of fibrosis is identified, but there is no cholestasis or necrosis, despite recent history of cardiogenic shock (probably because the zone 3 areas where shock necrosis would have manifested are all sclerosed). Portal tracts show no significant inflammation and bile ducts appear intact, although mild ductular reaction and loss of portal veins are seen.     The findings in this biopsy show marked centrizonal fibrosis that is compatible with  "\"cardiac cirrhosis\", in the context of long-standing cardiomyopathy. Clinical history of alcohol abuse is also noted, and it is therefore that alcohol could have contributed to centrizonal fibrosis, although there is currently no evidence of active alcohol steatohepatitis or other patterns of active alcohol-related injury. No evidence of acute necrosis from shock or other causes is apparent. There is also no evidence of active hepatitic or a chronic biliary process at this time.     CT  Moderate right pleural effusion, large ascites  Borderline splenomegaly  "

## 2021-03-03 NOTE — PLAN OF CARE
Cares [id number]    Major Shift Events: Pt alert and oriented overnight. Frustrated and agitated. Call light heavy. Pt did not get much sleep overnight. PRN oxycodone given for back pain. Heating pad to back.  HR 100s-110s. MAP > 65. RA lung sounds clear. 2g na diet with 1.8L fluid restriction, tolerating well. Several stools overnight stated by patient, flushed without nurse seeing stool. Voiding via urinal. TPA given through red lumen on PICC without success. Heparin gtt 1300units/hr, recheck Xa @ 0900. Nicotine patch in place. Mepilex to Coccyx, foot, and elbows. Covid swab done.     Plan: Continue to monitor and possible discharge to [place] today     For vital signs and complete assessments, please see documentation flowsheets.

## 2021-03-03 NOTE — PLAN OF CARE
OT 6C. Cancel. Pt declining therapy upon AM and PM attempt due to fatigue and nausea. Will reschedule per POC.

## 2021-03-03 NOTE — PROGRESS NOTES
Care Management Discharge Note    Discharge Date: 03/05/21     Discharge Disposition: Return transfer to [facility] in [place]    Discharge Services: N/A    Discharge DME: N/A    Discharge Transportation: [facility] Stretcher.    Private pay costs discussed: N/A    Education Provided on the Discharge Plan: Yes  Persons Notified of Discharge Plans: Pt, bedside RN  Patient/Family in Agreement with the Plan: not currently, pt stating he is unable to transfer this morning due to multiple loose stools overnight, concerned about transport. Discussed with MD.     Additional Information:  This writer received a call from Cards 2 fellow, Renzo that the team initiated return transfer to [facility] and there is an accepting physician. This writer called the [facility] Stat DOC line (Ph: [phone]) and spoke with Grecia who confirmed that they have accepted pt for return hospital transfer and they have a bed available today. Grecia also confirmed that as written in the transfer agreement, [facility] will cover transportation costs- should be billed to [facility], [address] [place] [address], [place] [address], Attention: Reyna Donnelly.   This writer called [facility] Transportation (Ph: [phone]) to arrange transport and they are arranging a crew,         CC will continue to monitor patient's medical condition and progress towards discharge.  Estefany Paul RN BSN  6C Unit Care Coordinator  Phone number: [phone]  Pager: [phone]    Addendum 3/3 at 0940:  This writer received call back from  Transport and they can do 4pm pickup. This writer called up to receiving unit,  East to update on transport time.   1315: This writer received update from cardiology team that pt is declining return hospital transfer and they will plan for pt to remain at [facility] while he bridges to therapeutic INR and then discharge directly home. This writer called and canceled [facility] Transport ride and updated [facility] Stat Doc line to cancel transfer.   This writer met with pt to follow up and discharge plan. Pt states that his fiance Tamie can come down to transport him home when he is ready for discharge. This writer discussed outpatient INR and warfarin monitoring with pt who strongly prefers it be managed by his cardiology team at [facility] rather than his primary care. This writer called [facility] Cardiology (Ph: [phone]) to schedule cardiology follow up and confirm if they are willing to manage his outpatient anticoagulation monitoring. The cardiology  is out for the afternoon and they will call back tomorrow morning. Pt most recently has seen Lashell BOONE in their heart failure clinic.  Pt confirmed that he plans to return to his home that he shares with his fiance, Tamie. Pt states that the home is handicap accessible at that he has grab bars in the bathrooms. OT worked with pt yesterday and recommended home with OP CR.  This writer sent email to financial counseling to follow up on status of MA application.

## 2021-03-03 NOTE — PROGRESS NOTES
[facility]    Cardiology Progress Note- Cards 2        Date of Admission:  2/26/2021     Assessment & Plan: HVSL   Miguel Ángel Henson is a 35 year old male with a PMHx of NICM (LVEF 15%) 2/2 to EtOH abuse, subcutaneous ICD with hx of VT and tobacco abuse who presented to OSH 2/17/20 profoundly volume overloaded with acute on chronic HFrEF, cardiogenic shock and AC requiring transfer to [facility] 2/26/2021 for consideration of advanced therapies. His cardiogenic shock has improved and he is hemodynamically stable on an oral regimen. Liver biopsy has shown fibrosis consistent with cardiac cirrhosis which is prohibitive for advanced cardiac therapies. His hospital course has otherwise been complicated by UGIB due to esophagitis.     Today's Plan:  - continue heparin bridge to warfarin  - consult procedure team for therapeutic paracentesis  - C Diff stool testing  - working to arrange follow-up with Dr Lashell Garcia in [place] following discharge  - no longer transferring to [facility] in [place] due to patient preference     ---Neuro---  # Category 2 Substance Abuse  - appreciate neuropsych evaluation  - recommend formal chemical dependency assessment  - recommend establishing care with a local psychiatrist or psychologist      ---Cardiovascular---  # Dilated NICM (LVEF 15%, LVIDd 7.7 cm) 2/2 to EtOH   # Acute on chronic HFrEF, NYHA class IV   # Cardiogenic shock   # Medication/clinic follow-up non compliance   Presented to OSH with marked volume overload and cardiogenic shock requiring multiple pressors. Decompensated HF is in setting of medication non-compliance. On admit to [facility], CI is 2.2 (on dobutamine 3 mcg/kg/min, NE 0.06 and vaso 2.4) with CVP of 8 (small collapsible IVC) after significant volume removal (70 lbs) at OSH. Consideration of advanced therapies limited by biopsy proven cirrhosis   TTE 2/27: LVEF 5-10%, LVIDd 8.97 cm, LV apical thrombus, mod-sev MR, mod-sev  TR Mild RV dysfunction,  [facility] : RA 6, RV 31/6, PA 31/16 (22), PCWP 14, Luis 5.2/3.2, TD 5.4/3.3, SVR 1060, PVR 1.5 on dobutamine 3, vaso 2.4, norepi 0.02  [place] Numbers : CVP 3, PA 28/17 (24), PCWP 12, SvO2 60, CI 2.05, SVR 1634 on vaso 1, norepi 0.06,  2  [place] numbers  CVP 13, PA 36/24, PCWP 16, SvO2 43, CI/CO 2.1/3.6, SVR 1622  -volume: hypervolemic, lasix 80mg po BID with gaol negative 500cc;  Repeat paracentesis per procedure team  -spironolactone 25mg daily  -PEth: negative  -neuropsych evaluation: completed     # LV apical thrombus  - heparin gtt  - warfarin     # Subcutaneous ICD with hx of VT  -ICD check unremarkable    ---Renal---  # Hyponatremia, 2/2 to volume overload; significantly improved from presentation  -trend BMP      # Diuretic induced hypokalemia  - replacement per unit protocol  - spironolactone     ---GI---  # Cardiac Cirrhosis  # Portal hypertension   # Ascites s/p multiple paracentesis   # Hx alcohol abuse with alcoholic pancreatis/hepatitis    Biopsy with severe fibrosis/cardiac cirrhosis. Per hepatology: No way to definitively state his liver will have improvement after heart transplant or the impact of recovery/progression with ongoing alcohol use.  -PEth as above   -trend LFTs  -on furosemide/spironolactone     # Upper GIB  Patient with coffee ground emesis. Hb 11 > 8. S/p EGD which showed esophagitis, friable mucosa. No evidence of varices.   - 72hrs ceftriaxone for SBP prophylaxis  - continue pantoprazole 40mg bid oral  - hemoglobin checks BID    # New onset diarrhea  - check stool C Diff toxin/PCR     ---ID---  No acute issues, no growth on cx from paracentesis   -covid negative      ---Endocrine--   No acute issues     ---Hematology---  # Acute Blood Loss Anemia 2/2 UGIB  - transfuse for Hgb<7     ---Nutrition--   #  Severe Malnutrition  - nutrition consult  - multivitamin     ---Dermatologic--   # Extremity Wounds  - care per WOC recommendations     FEN: 2g Na, 1800cc  fluid restricted   DVT Prophylaxis: Heparin gtt  Code Status: Full     Entered: Renzo Dorman MD 03/03/2021, 8:11 AM       The patient's care was discussed with the Attending Physician, Dr. Pratt, Patient and Patient's Family.    Renzo Dorman MD   Cardiology Fellow  [id number]  [facility]  Please see sign in/sign out for up to date coverage information  ______________________________________________________________________    Interval History   New diarrhea overnight. Stools reportedly ever 30min for most of the night. Not observed. Per patient, initial stool was dark black, then subsequently green and soft. No abdominal discomfort. No lightheadedness or dizziness. No shortness of breath.    Data reviewed today: I reviewed all medications, new labs and imaging results over the last 24 hours.    Physical Exam   Vital Signs: Temp: 98.2  F (36.8  C) Temp src: Oral BP: 96/67 Pulse: 111   Resp: 16 SpO2: 100 % O2 Device: None (Room air)    Weight: 144 lbs 9.6 oz  Constitutional: awake, alert, no apparent distress, cachectic in appearance  Eyes: Nonicteric, pupils equal  HENT: NCAT, temporal wasting  Respiratory: Nonlabored,  Decreased air movement over the b/l bases, no crackles or wheezes  Cardiovascular: RRR,  III/VI Holosystolic blowing murmur best appreciated over apex, parasternal heave, laterally displaced PMI  GI: Distended abdomen, nonrigid, NT. + fluid wave  Skin: warm and dry, no rash, feet with bandages in place  Musculoskeletal: thin extremities, wwp, no peripheral edema  Neurologic: AAOx3, CN grossly intact, spontaneous movement of all extremities    Data   Recent Labs   Lab 03/03/21  0545 03/02/21  1856 03/02/21  1641 03/02/21  1504 03/02/21  0537 02/26/21  0030 02/26/21  0030   WBC 7.2 7.9  --  8.0 7.4   < > 6.6   HGB 7.6* 7.2*  --  7.7* 7.6*   < > 12.0*   * 101*  --  102* 100   < > 97    237  --  237 221   < > 150   INR 1.19*  --  1.15*  --    --   --  1.16*   *  --   --  126* 126*   < > 128*   POTASSIUM 4.3  --   --  4.2 4.0   < > 3.4   CHLORIDE 94  --   --  93* 93*   < > 77*   CO2 23  --   --  25 25   < > >45*   BUN 21  --   --  21 22   < > 23   CR 0.78  --   --  0.71 0.76   < > 0.91   ANIONGAP 10  --   --  8 8   < > Not Calculated   ALEKSEY 8.1*  --   --  8.1* 7.8*   < > 9.1   *  --   --  102* 118*   < > 131*   ALBUMIN 2.6*  --   --   --  2.6*   < > 3.1*   PROTTOTAL 5.5*  --   --   --  5.3*   < > 5.8*   BILITOTAL 1.9*  --   --   --  1.7*   < > 3.7*   ALKPHOS 186*  --   --   --  182*   < > 128   ALT 30  --   --   --  33   < > 19   AST 37  --   --   --  49*   < > 31    < > = values in this interval not displayed.     Recent Results (from the past 24 hour(s))   CT External Imaging Abdomen    Narrative    Images were obtained from an external facility.  Click PACS Images   hyperlink to view images.  Textual results have been scanned into the   media tab.     Medications     - MEDICATION INSTRUCTIONS -       heparin 1,300 Units/hr (03/03/21 1201)     - MEDICATION INSTRUCTIONS -       Warfarin Therapy Reminder         folic acid  1 mg Oral Daily     furosemide  80 mg Oral BID     lidocaine  1 patch Transdermal Q24H     lidocaine   Transdermal Q8H     lisinopril  2.5 mg Oral Daily     magnesium sulfate  2 g Intravenous Once     menthol   Transdermal Q8H     multivitamin w/minerals  1 tablet Oral Daily     nicotine  1 patch Transdermal Daily     nicotine   Transdermal Q8H     pantoprazole  40 mg Oral BID AC     spironolactone  25 mg Oral Daily     vitamin B1  100 mg Oral Daily     warfarin ANTICOAGULANT  5 mg Oral ONCE at 18:00

## 2021-03-03 NOTE — PLAN OF CARE
"BP 90/72   Pulse 107   Temp 98.4  F (36.9  C) (Oral)   Resp 18   Ht 1.778 m (5' 10\")   Wt 63.5 kg (139 lb 14.4 oz)   SpO2 97%   BMI 20.07 kg/m      Shift: 7732-5606  Reason for Admission: volume overload and acute HFrEF exacerbation/cardiogenic shock requiring pressors and AC  VS: Soft BPs but MAPs >65, Sinus tach. OVSS on RA  Neuros: A/Ox4, able to make needs known. Frustrated and agitated today.   GI/: 1 BM this shift. Voiding adequately   Nutrition: Tolerating 2g Na+. 1.8L FR, non-compliance with FR.   Drains/Lines: R. PICC 2L- red port occluded, will order TPA. PIV SL. Heparin gtt started at 1150 units/hr.  Activity: SBA  Pain/Nausea: C/o of back pain- heating pad in use. Denies nausea?  Respiratory: Dyspnea with exertion   Skin: Mepilex on bilateral elbows, R. Dorsal foot- mepilex in place, Coccyx- mepilex in place. Abdominal distention.   Labs: Hep 10a due at 0100.   Social: Girlfriend at bedside  New this shift: Started on heparin gtt  Plan of care: Plan to discharge to [facility] tomorrow, please coordinate with SW regarding transportation costs. Will continue to monitor and follow POC.   "

## 2021-03-03 NOTE — PLAN OF CARE
"  BP 93/70 (BP Location: Left arm)   Pulse 107   Temp 98  F (36.7  C) (Oral)   Resp 18   Ht 1.778 m (5' 10\")   Wt 65.6 kg (144 lb 9.6 oz)   SpO2 100%   BMI 20.75 kg/m      Time: 5593-0606.  Reason for admission: Non-ischemic cardiomyopathy d/t alcoholic cardiomyopathy.     VS: VSS on RA with O2 sats in high 90s. Afebrile.   Activity: Up with SBA, steady on feet. Calls appropriately.   Neuros: A&Ox4. Labile mood, frustrated with cares/hospital stay, irritable. Neuros intact. C/o low back pain managed with scheduled Lidocaine patches.   Cardiac: ST, 100s. Soft BPs, 90s/70s, scheduled AM Lisinopril held. Denies chest pain.   Respiratory: LS clear but diminished. C/o MCCORMICK. Stable on RA. No cough noted.   GI/: Voiding without difficulty. x1 Loose BM on this shift, CDiff sample sent to lab, results pending. +BS, +gas. x1 episode of emesis, 400 mL of undigested food, MD updated, one-tome dose of IV Zofran given with relief. Abdomen distended and rounded, orders for paracentesis placed by team.   Diet: 2g Na diet, tolerating well but low PO intake. 1.8L FR, pt needs re-education on importance of of fluid restriction.   Skin: Wound to coccyx, pt refusing assessment and dressing change this morning. Abrasion on knees, DEVAN. Wound on right foot, wound care and dressing change completed this afternoon. Lidocaine patch on lower back.   Lines: Right PIV infusing heparin gtt at 1300 units/hr. Right double lumen PICC: red lumen completely occluded, unable to flush with saline or TPA,other lumen with good blood return and SL'd.   Labs: Hep10A therapeutic at 0.47, recheck at 1645.     New changes this shift/Plan: VSS on RA, afebrile. Up with SBA. A&Ox4, irritable/liable mood. Denies pain, lidocaine patch on lower back. ST, 100s. Voiding well, x1 BM, sample sent for CDiff r/o. Tolerating diet, x1 episode of emesis, IV Zofran given. Mepilex to coccyx, right foot wound care completed, mepilex in place. Right PIV infusing heparin " gtt at 1300 units/hr, next hep10A recheck ordered for 1645. PLC Warfarin teaching scheduled for Sunday at 1315, significant other aware.   Will continue to monitor & follow POC.

## 2021-03-04 ENCOUNTER — DOCUMENTATION ONLY (OUTPATIENT)
Dept: CARDIOLOGY | Facility: CLINIC | Age: 36
End: 2021-03-04

## 2021-03-04 ENCOUNTER — APPOINTMENT (OUTPATIENT)
Dept: OCCUPATIONAL THERAPY | Facility: CLINIC | Age: 36
End: 2021-03-04
Attending: INTERNAL MEDICINE
Payer: MEDICAID

## 2021-03-04 LAB
ABO + RH BLD: NORMAL
ABO + RH BLD: NORMAL
ALBUMIN SERPL-MCNC: 2.4 G/DL (ref 3.4–5)
ALP SERPL-CCNC: 161 U/L (ref 40–150)
ALT SERPL W P-5'-P-CCNC: 27 U/L (ref 0–70)
ANION GAP SERPL CALCULATED.3IONS-SCNC: 10 MMOL/L (ref 3–14)
ANION GAP SERPL CALCULATED.3IONS-SCNC: 9 MMOL/L (ref 3–14)
APPEARANCE FLD: CLEAR
APTT PPP: 32 SEC (ref 22–37)
AST SERPL W P-5'-P-CCNC: 36 U/L (ref 0–45)
BACTERIA SPEC CULT: NO GROWTH
BASOPHILS NFR FLD MANUAL: 1 %
BILIRUB SERPL-MCNC: 1.3 MG/DL (ref 0.2–1.3)
BLD GP AB SCN SERPL QL: NORMAL
BLD PROD TYP BPU: NORMAL
BLD PROD TYP BPU: NORMAL
BLD UNIT ID BPU: 0
BLOOD BANK CMNT PATIENT-IMP: NORMAL
BLOOD PRODUCT CODE: NORMAL
BPU ID: NORMAL
BUN SERPL-MCNC: 25 MG/DL (ref 7–30)
BUN SERPL-MCNC: 26 MG/DL (ref 7–30)
CALCIUM SERPL-MCNC: 7.8 MG/DL (ref 8.5–10.1)
CALCIUM SERPL-MCNC: 8.1 MG/DL (ref 8.5–10.1)
CHLORIDE SERPL-SCNC: 90 MMOL/L (ref 94–109)
CHLORIDE SERPL-SCNC: 92 MMOL/L (ref 94–109)
CO2 SERPL-SCNC: 23 MMOL/L (ref 20–32)
CO2 SERPL-SCNC: 23 MMOL/L (ref 20–32)
COLOR FLD: YELLOW
CREAT SERPL-MCNC: 0.93 MG/DL (ref 0.66–1.25)
CREAT SERPL-MCNC: 1.03 MG/DL (ref 0.66–1.25)
ERYTHROCYTE [DISTWIDTH] IN BLOOD BY AUTOMATED COUNT: 17.2 % (ref 10–15)
ERYTHROCYTE [DISTWIDTH] IN BLOOD BY AUTOMATED COUNT: 17.3 % (ref 10–15)
ERYTHROCYTE [DISTWIDTH] IN BLOOD BY AUTOMATED COUNT: 17.4 % (ref 10–15)
GFR SERPL CREATININE-BSD FRML MDRD: >90 ML/MIN/{1.73_M2}
GFR SERPL CREATININE-BSD FRML MDRD: >90 ML/MIN/{1.73_M2}
GLUCOSE SERPL-MCNC: 103 MG/DL (ref 70–99)
GLUCOSE SERPL-MCNC: 95 MG/DL (ref 70–99)
GRAM STN SPEC: NORMAL
HCT VFR BLD AUTO: 23.1 % (ref 40–53)
HCT VFR BLD AUTO: 23.3 % (ref 40–53)
HCT VFR BLD AUTO: 23.9 % (ref 40–53)
HGB BLD-MCNC: 7.2 G/DL (ref 13.3–17.7)
HGB BLD-MCNC: 7.4 G/DL (ref 13.3–17.7)
HGB BLD-MCNC: 7.7 G/DL (ref 13.3–17.7)
INR PPP: 1.27 (ref 0.86–1.14)
LYMPHOCYTES NFR FLD MANUAL: 19 %
MAGNESIUM SERPL-MCNC: 2.3 MG/DL (ref 1.6–2.3)
MCH RBC QN AUTO: 31.3 PG (ref 26.5–33)
MCH RBC QN AUTO: 31.8 PG (ref 26.5–33)
MCH RBC QN AUTO: 32.8 PG (ref 26.5–33)
MCHC RBC AUTO-ENTMCNC: 31.2 G/DL (ref 31.5–36.5)
MCHC RBC AUTO-ENTMCNC: 31.8 G/DL (ref 31.5–36.5)
MCHC RBC AUTO-ENTMCNC: 32.2 G/DL (ref 31.5–36.5)
MCV RBC AUTO: 100 FL (ref 78–100)
MCV RBC AUTO: 100 FL (ref 78–100)
MCV RBC AUTO: 102 FL (ref 78–100)
NEUTS BAND NFR FLD MANUAL: 58 %
NUM BPU REQUESTED: 1
OTHER CELLS FLD MANUAL: 22 %
PLATELET # BLD AUTO: 258 10E9/L (ref 150–450)
PLATELET # BLD AUTO: 282 10E9/L (ref 150–450)
PLATELET # BLD AUTO: 301 10E9/L (ref 150–450)
POTASSIUM SERPL-SCNC: 4.1 MMOL/L (ref 3.4–5.3)
POTASSIUM SERPL-SCNC: 4.6 MMOL/L (ref 3.4–5.3)
PROT SERPL-MCNC: 5.2 G/DL (ref 6.8–8.8)
RBC # BLD AUTO: 2.3 10E12/L (ref 4.4–5.9)
RBC # BLD AUTO: 2.33 10E12/L (ref 4.4–5.9)
RBC # BLD AUTO: 2.35 10E12/L (ref 4.4–5.9)
SODIUM SERPL-SCNC: 123 MMOL/L (ref 133–144)
SODIUM SERPL-SCNC: 124 MMOL/L (ref 133–144)
SPECIMEN EXP DATE BLD: NORMAL
SPECIMEN SOURCE FLD: NORMAL
SPECIMEN SOURCE: NORMAL
SPECIMEN SOURCE: NORMAL
TRANSFUSION STATUS PATIENT QL: NORMAL
TRANSFUSION STATUS PATIENT QL: NORMAL
UFH PPP CHRO-ACNC: 0.26 IU/ML
UFH PPP CHRO-ACNC: 0.58 IU/ML
WBC # BLD AUTO: 6.5 10E9/L (ref 4–11)
WBC # BLD AUTO: 7 10E9/L (ref 4–11)
WBC # BLD AUTO: 7.4 10E9/L (ref 4–11)
WBC # FLD AUTO: 175 /UL

## 2021-03-04 PROCEDURE — 85520 HEPARIN ASSAY: CPT | Performed by: INTERNAL MEDICINE

## 2021-03-04 PROCEDURE — 36592 COLLECT BLOOD FROM PICC: CPT | Performed by: INTERNAL MEDICINE

## 2021-03-04 PROCEDURE — 49083 ABD PARACENTESIS W/IMAGING: CPT | Performed by: PEDIATRICS

## 2021-03-04 PROCEDURE — 999N000147 HC STATISTIC PT IP EVAL DEFER: Performed by: PHYSICAL THERAPIST

## 2021-03-04 PROCEDURE — 36415 COLL VENOUS BLD VENIPUNCTURE: CPT | Performed by: INTERNAL MEDICINE

## 2021-03-04 PROCEDURE — 250N000011 HC RX IP 250 OP 636

## 2021-03-04 PROCEDURE — 89051 BODY FLUID CELL COUNT: CPT | Performed by: PEDIATRICS

## 2021-03-04 PROCEDURE — 250N000013 HC RX MED GY IP 250 OP 250 PS 637

## 2021-03-04 PROCEDURE — 86923 COMPATIBILITY TEST ELECTRIC: CPT | Performed by: INTERNAL MEDICINE

## 2021-03-04 PROCEDURE — 80053 COMPREHEN METABOLIC PANEL: CPT | Performed by: INTERNAL MEDICINE

## 2021-03-04 PROCEDURE — 87205 SMEAR GRAM STAIN: CPT | Performed by: PEDIATRICS

## 2021-03-04 PROCEDURE — 250N000013 HC RX MED GY IP 250 OP 250 PS 637: Performed by: INTERNAL MEDICINE

## 2021-03-04 PROCEDURE — 99232 SBSQ HOSP IP/OBS MODERATE 35: CPT | Mod: GC | Performed by: INTERNAL MEDICINE

## 2021-03-04 PROCEDURE — 87015 SPECIMEN INFECT AGNT CONCNTJ: CPT | Performed by: PEDIATRICS

## 2021-03-04 PROCEDURE — 86850 RBC ANTIBODY SCREEN: CPT | Performed by: INTERNAL MEDICINE

## 2021-03-04 PROCEDURE — 86900 BLOOD TYPING SEROLOGIC ABO: CPT | Performed by: INTERNAL MEDICINE

## 2021-03-04 PROCEDURE — 0W9G3ZZ DRAINAGE OF PERITONEAL CAVITY, PERCUTANEOUS APPROACH: ICD-10-PCS | Performed by: PEDIATRICS

## 2021-03-04 PROCEDURE — 97110 THERAPEUTIC EXERCISES: CPT | Mod: GO | Performed by: OCCUPATIONAL THERAPIST

## 2021-03-04 PROCEDURE — 250N000013 HC RX MED GY IP 250 OP 250 PS 637: Performed by: STUDENT IN AN ORGANIZED HEALTH CARE EDUCATION/TRAINING PROGRAM

## 2021-03-04 PROCEDURE — 250N000011 HC RX IP 250 OP 636: Performed by: STUDENT IN AN ORGANIZED HEALTH CARE EDUCATION/TRAINING PROGRAM

## 2021-03-04 PROCEDURE — 83735 ASSAY OF MAGNESIUM: CPT | Performed by: INTERNAL MEDICINE

## 2021-03-04 PROCEDURE — 85027 COMPLETE CBC AUTOMATED: CPT

## 2021-03-04 PROCEDURE — P9041 ALBUMIN (HUMAN),5%, 50ML: HCPCS | Performed by: STUDENT IN AN ORGANIZED HEALTH CARE EDUCATION/TRAINING PROGRAM

## 2021-03-04 PROCEDURE — 85027 COMPLETE CBC AUTOMATED: CPT | Performed by: INTERNAL MEDICINE

## 2021-03-04 PROCEDURE — 80048 BASIC METABOLIC PNL TOTAL CA: CPT | Performed by: INTERNAL MEDICINE

## 2021-03-04 PROCEDURE — 97530 THERAPEUTIC ACTIVITIES: CPT | Mod: GO | Performed by: OCCUPATIONAL THERAPIST

## 2021-03-04 PROCEDURE — 36592 COLLECT BLOOD FROM PICC: CPT

## 2021-03-04 PROCEDURE — 99233 SBSQ HOSP IP/OBS HIGH 50: CPT | Mod: GC | Performed by: INTERNAL MEDICINE

## 2021-03-04 PROCEDURE — 214N000001 HC R&B CCU UMMC

## 2021-03-04 PROCEDURE — 85610 PROTHROMBIN TIME: CPT | Performed by: INTERNAL MEDICINE

## 2021-03-04 PROCEDURE — 87070 CULTURE OTHR SPECIMN AEROBIC: CPT | Performed by: PEDIATRICS

## 2021-03-04 PROCEDURE — 250N000011 HC RX IP 250 OP 636: Performed by: NURSE PRACTITIONER

## 2021-03-04 PROCEDURE — 86901 BLOOD TYPING SEROLOGIC RH(D): CPT | Performed by: INTERNAL MEDICINE

## 2021-03-04 PROCEDURE — 85730 THROMBOPLASTIN TIME PARTIAL: CPT

## 2021-03-04 PROCEDURE — P9016 RBC LEUKOCYTES REDUCED: HCPCS | Performed by: INTERNAL MEDICINE

## 2021-03-04 RX ORDER — WARFARIN SODIUM 5 MG/1
5 TABLET ORAL
Status: COMPLETED | OUTPATIENT
Start: 2021-03-04 | End: 2021-03-04

## 2021-03-04 RX ORDER — ALBUMIN, HUMAN INJ 5% 5 %
25 SOLUTION INTRAVENOUS ONCE
Status: COMPLETED | OUTPATIENT
Start: 2021-03-04 | End: 2021-03-05

## 2021-03-04 RX ORDER — HEPARIN SODIUM 10000 [USP'U]/100ML
0-5000 INJECTION, SOLUTION INTRAVENOUS CONTINUOUS
Status: DISCONTINUED | OUTPATIENT
Start: 2021-03-04 | End: 2021-03-05

## 2021-03-04 RX ORDER — FUROSEMIDE 10 MG/ML
80 INJECTION INTRAMUSCULAR; INTRAVENOUS ONCE
Status: DISCONTINUED | OUTPATIENT
Start: 2021-03-04 | End: 2021-03-05

## 2021-03-04 RX ORDER — SPIRONOLACTONE 25 MG/1
50 TABLET ORAL DAILY
Status: DISCONTINUED | OUTPATIENT
Start: 2021-03-05 | End: 2021-03-10 | Stop reason: HOSPADM

## 2021-03-04 RX ORDER — TORSEMIDE 20 MG/1
40 TABLET ORAL
Status: DISCONTINUED | OUTPATIENT
Start: 2021-03-05 | End: 2021-03-05

## 2021-03-04 RX ORDER — SPIRONOLACTONE 25 MG/1
50 TABLET ORAL DAILY
Status: CANCELLED | OUTPATIENT
Start: 2021-03-05

## 2021-03-04 RX ADMIN — SPIRONOLACTONE 25 MG: 25 TABLET, FILM COATED ORAL at 07:58

## 2021-03-04 RX ADMIN — ACETAMINOPHEN 650 MG: 325 TABLET, FILM COATED ORAL at 13:53

## 2021-03-04 RX ADMIN — Medication 5 ML: at 13:52

## 2021-03-04 RX ADMIN — OXYCODONE HYDROCHLORIDE 5 MG: 5 TABLET ORAL at 00:41

## 2021-03-04 RX ADMIN — ACETAMINOPHEN 650 MG: 325 TABLET, FILM COATED ORAL at 04:35

## 2021-03-04 RX ADMIN — OXYCODONE HYDROCHLORIDE 5 MG: 5 TABLET ORAL at 09:36

## 2021-03-04 RX ADMIN — HEPARIN SODIUM 1300 UNITS/HR: 10000 INJECTION, SOLUTION INTRAVENOUS at 14:31

## 2021-03-04 RX ADMIN — PANTOPRAZOLE SODIUM 40 MG: 40 TABLET, DELAYED RELEASE ORAL at 07:58

## 2021-03-04 RX ADMIN — LISINOPRIL 2.5 MG: 2.5 TABLET ORAL at 07:57

## 2021-03-04 RX ADMIN — ACETAMINOPHEN 650 MG: 325 TABLET, FILM COATED ORAL at 00:41

## 2021-03-04 RX ADMIN — PANTOPRAZOLE SODIUM 40 MG: 40 TABLET, DELAYED RELEASE ORAL at 16:38

## 2021-03-04 RX ADMIN — FOLIC ACID 1 MG: 1 TABLET ORAL at 07:57

## 2021-03-04 RX ADMIN — WARFARIN SODIUM 5 MG: 5 TABLET ORAL at 18:30

## 2021-03-04 RX ADMIN — ACETAMINOPHEN 650 MG: 325 TABLET, FILM COATED ORAL at 20:54

## 2021-03-04 RX ADMIN — FUROSEMIDE 80 MG: 40 TABLET ORAL at 16:38

## 2021-03-04 RX ADMIN — HEPARIN SODIUM 1300 UNITS/HR: 10000 INJECTION, SOLUTION INTRAVENOUS at 03:07

## 2021-03-04 RX ADMIN — FUROSEMIDE 80 MG: 40 TABLET ORAL at 07:57

## 2021-03-04 RX ADMIN — ACETAMINOPHEN 650 MG: 325 TABLET, FILM COATED ORAL at 09:37

## 2021-03-04 RX ADMIN — HYDROXYZINE HYDROCHLORIDE 25 MG: 25 TABLET, FILM COATED ORAL at 03:43

## 2021-03-04 RX ADMIN — THIAMINE HCL TAB 100 MG 100 MG: 100 TAB at 07:57

## 2021-03-04 RX ADMIN — OXYCODONE HYDROCHLORIDE 5 MG: 5 TABLET ORAL at 13:53

## 2021-03-04 RX ADMIN — MULTIPLE VITAMINS W/ MINERALS TAB 1 TABLET: TAB at 07:57

## 2021-03-04 RX ADMIN — OXYCODONE HYDROCHLORIDE 5 MG: 5 TABLET ORAL at 20:54

## 2021-03-04 RX ADMIN — OXYCODONE HYDROCHLORIDE 5 MG: 5 TABLET ORAL at 04:35

## 2021-03-04 RX ADMIN — NICOTINE 7 MG/24 HR DAILY TRANSDERMAL PATCH 1 PATCH: at 07:57

## 2021-03-04 RX ADMIN — ALBUMIN HUMAN 12.5 G: 0.05 INJECTION, SOLUTION INTRAVENOUS at 22:39

## 2021-03-04 ASSESSMENT — ACTIVITIES OF DAILY LIVING (ADL)
ADLS_ACUITY_SCORE: 16

## 2021-03-04 ASSESSMENT — MIFFLIN-ST. JEOR: SCORE: 1597.13

## 2021-03-04 NOTE — PLAN OF CARE
PT 6C: Per OT, pt moving well and without acute PT needs. OT will continue to follow while inpatient, PT orders will be completed.

## 2021-03-04 NOTE — PROGRESS NOTES
D: Patient with NICM admitted to OSH for volume overload, acute on chronic HFrEF, cardiogenic shock and AC requiring transfer to [facility] on 2/26/21 for evaluation of advanced cardiac therapies.    I: Monitored vitals and assessed pt status.   Changed: Paracentesis de today and removed 2L.   Running: heparin @1300, next Xa due at 2035.  PRN: oxy and tylenol.     A: A0x4. VSS. Afebrile. Urinating adequately. SBA. DL PICC and R PIV.      P: Continue to monitor Pt status and report changes to treatment team.  Suad Saleh RN on 3/4/2021 at 5:30 PM  [id number]

## 2021-03-04 NOTE — PROGRESS NOTES
Care Management Follow Up    Length of Stay (days): 6    Expected Discharge Date: 03/07/21  Sig other: Tamie Willett will give pt a ride home     Concerns to be Addressed:   New warfarin monitoring    Patient plan of care discussed at interdisciplinary rounds: Yes    Anticipated Discharge Disposition: home      Anticipated Discharge Services:  INR monitoring  Anticipated Discharge DME:  None needed    Patient/family educated on Medicare website which has current facility and service quality ratings:    Education Provided on the Discharge Plan: Yes  PLC on 3/7/21 @ 1:15pm for warfarin ed   Patient/Family in Agreement with the Plan:  Yes    Referrals Placed by CM/SW:    Private pay costs discussed: Not applicable    Additional Information:  Mr Henson, you are scheduled for:    Per call from Yeny Ph: [phone]--she has pt scheduled for:  [facility] [place] @ [facility]  Address: [address] [place] [address]    Friday, 3/18/21 @ 8am  Dr Lashell Solis  I have called pt on his room phone and informed him of this today.    And    New warfarin   Indication: Atril fibrillation/LV Thrombus   Goal INR: 2-3   Duration: to be determined     Primary Care MD: Dr Carlton Jacome   Clinic: [facility]   Address: [address] [place] [id number]     Ph: [phone] scheduling     INR RN: Please see her after your appopintment with Dr Jacome  Fax: [phone]  Appointment is on: Monday, 3/8/21 @ 2:40pm---please arrive at 2:25pm    I have verified with Miguel Ángel that this is his PCP and he is aware of possible 3/8 INR appointment.       Velvet Cabrera RN

## 2021-03-04 NOTE — PLAN OF CARE
D: Pt presented to OSH 2/17/20 d/t heart failure exacerbation, cardiogenic shock and AC transferred to [facility] 2/26/2021 for consideration of advanced therapies.    PMHx NICM (LVEF 15%) 2/2 to EtOH abuse, VT s/p ICD, and tobacco abuse      I: Monitored vitals and assessed pt status.   Changed: Heparin gtt held for paracentesis today  PRN: 5mg Oxycodone x2, Tylenol x2, Atarax  Tele: ST  O2: RA  Mobility: SBA     A: A0x4. VSS. Afebrile. Urinating adequately. + BM overnight. Pt flushed before RN could see, but per pt BM formed. Severe ascites. Dry, flaky, itchy skin. Dressings intact. C/o back pain. Able to make needs known.      P: Continue to monitor Pt status and report changes to Cards 2.

## 2021-03-04 NOTE — PROGRESS NOTES
"    GASTROENTEROLOGY PROGRESS NOTE    Date: 03/04/2021     ASSESSMENT:  34yo w/ a hx of NICM (LVEF 15%) 2/2 to EtOH abuse and hx of VT s/p ICD who initially presented to an OSH 2/17 w/ volume overload, found to be in cardiogenic shock w/ an AC. Transferred to [facility] on 2/26 for consideration of advanced therapies. GI was consulted for c/f liver disease based on imaging. Patient is now on oral therapy for HF     No SBP on diagnostic paracentesis, SAAG 1.2, T. Protein 2.4. Patient underwent a transjugular liver biopsy, HVPG 5. Biopsy showed chronic congestive hepatopathy, with severe fibrosis (cardiac cirrhosis) w/o evidence of necrosis from recent shock. Given his history of alcohol use, there is no way to determine whether the fibrosis will improve if he has a heart transplant.      RECOMMENDATIONS:   - Patient to see a hepatologist as an outpatient to monitor his fibrosis, please place referral on discharge   - Discussed the importance of abstinence from alcohol      Thank you for involving us in this patient's care. Please do not hesitate to contact the GI service with any questions or concerns.      Patient was seen and d/w Dr. Trinidad, GI staff physician.    Makenzie Jones   Gastroenterology Fellow  _______________________________________________________________    Subjective:  Patient feels well, has no complaints; denied abdominal pain    Objective:  Blood pressure 95/66, pulse 105, temperature 97.9  F (36.6  C), temperature source Oral, resp. rate 18, height 1.778 m (5' 10\"), weight 66.6 kg (146 lb 12.8 oz), SpO2 100 %.    Constitutional: NAD, on RA  Eyes: conjunctiva anicteric  CV: No edema  Respiratory: Unlabored breathing  Abd: distended, nontender, no peritoneal signs  Skin: warm, perfused, no jaundice  Neuro: AAO x 3, fluent speech   MSK: No gross deformities;    LABS:  BMP  Recent Labs   Lab 03/04/21  0547 03/03/21  1450 03/03/21  0545 03/02/21  1504   * 128* 128* 126*   POTASSIUM 4.1 4.1 4.3 4.2 "   CHLORIDE 92* 94 94 93*   ALEKSEY 7.8* 7.8* 8.1* 8.1*   CO2 23 24 23 25   BUN 25 23 21 21   CR 0.93 0.78 0.78 0.71   GLC 95 100* 111* 102*     CBC  Recent Labs   Lab 03/04/21  0547 03/03/21  1450 03/03/21  0545 03/02/21  1856   WBC 6.5 6.9 7.2 7.9   RBC 2.30* 2.27* 2.41* 2.25*   HGB 7.2* 7.2* 7.6* 7.2*   HCT 23.1* 22.9* 24.3* 22.7*    101* 101* 101*   MCH 31.3 31.7 31.5 32.0   MCHC 31.2* 31.4* 31.3* 31.7   RDW 17.2* 16.7* 16.7* 16.4*    247 256 237     INR  Recent Labs   Lab 03/04/21  0547 03/03/21  0545 03/02/21  1641 02/26/21  0030   INR 1.27* 1.19* 1.15* 1.16*     LFTs  Recent Labs   Lab 03/04/21  0547 03/03/21  0545 03/02/21  0537 03/01/21  0325   ALKPHOS 161* 186* 182* 106   AST 36 37 49* 34   ALT 27 30 33 20   BILITOTAL 1.3 1.9* 1.7* 1.9*   PROTTOTAL 5.2* 5.5* 5.3* 4.6*   ALBUMIN 2.4* 2.6* 2.6* 2.4*      PANCNo lab results found in last 7 days.    IMAGING:  CT ABDOMEN PELVIS W CONTRAST, 3/2/2021 3:10 AM  IMPRESSION:   1. No evidence of retroperitoneal bleed or other acute complication in  the abdomen/pelvis.  2. Cardiomegaly with large intra-abdominal ascites, moderate right  pleural effusion, diffuse mild anasarca, and IVC distention/dilation,  consistent with sequela of cardiac dysfunction.   3. Diffusely thickened small and large bowel without abnormal  enhancement or other evidence of bowel complication, likely sequela of  the above dysfunction/reactive to large volume ascites.  4. Heterogeneous enhancement of the hepatic parenchyma, which may  reflect intrinsic parenchymal disease. Correlate with clinical  symptoms and laboratory findings.    US ABDOMEN LIMITED WITH DOPPLER COMPLETE PORTABLE,  2/26/2021 9:55 AM   Impression:   1a. Increased parenchymal echogenicity along the portal system with  flowing echogenic foci within the portal veins, favored to be  secondary to recent contrast-enhanced echocardiogram. Alternatively,  these findings can be seen with pneumatosis/portal venous gas.  1b. No  appreciable focal hepatic lesion, however, smaller lesions may  be obscured by previously mentioned artifactual echogenicity.  1c. Patent hepatic vasculature.  2. Large volume ascites.  3. Biliary sludge.

## 2021-03-04 NOTE — CONSULTS
Consult and Procedure Service - Procedure Note    Attending:Jessica De Anda MD   Procedure: Diagnostic and therapeutic paracentesis  Indication: Symptomatic ascites  Risk Assessment: Elevated bleeding risk with heparin gtt, but held adequately before  Pre-procedure diagnosis: Ascites  Post-procedure diagnosis: Same    The risks and benefits of the procedure were explained to Will who expressed understanding and opted to proceed.  Consent was obtained and placed in the chart.  A time out was performed.  An area of ascites was located and marked using ultrasound guidance in the right lower quadrant; the area was prepped and draped in the usual sterile fashion.  8 ml of 1% lidocaine was instilled and ascites located.  The Taglocity paracentesis catheter and needle were inserted under real-time guidance until ascites obtained then the needle removed and the catheter advanced.  The apparatus was connected to vacuum bottles and a total of 2000 ml of yellow colored fluid removed.   A specimen was sent for analysis. The catheter was withdrawn and the area dressed.  Patient tolerated the procedure well with no immediate complications.  Please contact the Consult and Procedure Service if any complications or concerns arise.     Jessica De Anda MD   DOS:  3/4/2021

## 2021-03-04 NOTE — PLAN OF CARE
Pt admitted 2/26 from OSH with hypervolemia, acute on chronic HFrEF, cardiogenic shock and AC.  S/p liver biopsy showed cardiac cirrhosis.  UGIB as well.  ST low 100's, VS'S on RA with butt/back pain and medicated with prn Tylenol and Roxicodone.  Right had pain with heat applied.  Generalized itching and medicated with prn Atarax and cleansed b/l LE.  Prn Imodium given and C-diff came back negative.  Hep gtt continues at 1300 units/hr and next 10a with am labs.  Dose of Warfarin given tonight.  Severe ascites.  Adhering to 1.8 liter fluid restriction.  Otherwise, pt resting well and up SBA.  Continue to monitor and with POC.

## 2021-03-04 NOTE — PLAN OF CARE
D: Patient with NICM admitted to OSH for volume overload, acute on chronic HFrEF, cardiogenic shock and AC requiring transfer to [facility] on 2/26/21 for evaluation of advanced cardiac therapies.    I/A: Monitored vitals and assessed patient status. A0x4. VSS. NSR/tachy 90's-100's. Afebrile. Urinating small amounts per time via urinal. Up with SB assist. Back pain controlled with prn oxycodone and tylenol.    Completed: Paracentesis at bedside - 2L removed  PRN: Tylenol, Oxycodone    P: Continue to monitor patient status and report changes to treatment team. Discharge to home when stable and INR therapeutic.

## 2021-03-04 NOTE — PROGRESS NOTES
[facility]    Cardiology Progress Note- Cards 2        Date of Admission:  2/26/2021     Assessment & Plan: HVSL   Miguel Ángel Henson is a 35 year old male with a PMHx of NICM (LVEF 15%) 2/2 to EtOH abuse, subcutaneous ICD with hx of VT and tobacco abuse who presented to OSH 2/17/20 profoundly volume overloaded with acute on chronic HFrEF, cardiogenic shock and AC requiring transfer to [facility] 2/26/2021 for consideration of advanced therapies. His cardiogenic shock has improved and he is hemodynamically stable on an oral regimen. Liver biopsy has shown fibrosis consistent with cardiac cirrhosis which is prohibitive for advanced cardiac therapies. His hospital course has otherwise been complicated by UGIB due to esophagitis.     Today's Plan:  - continue heparin bridge to warfarin; ok to pause for paracentesis  - procedure team for therapeutic paracentesis  - working to arrange follow-up with Dr Lashell Garcia in [place] following discharge  - transfuse 1u pRBC for ongoing anemia  - lasix 80mg IV x 1  - consider increased spironolactone and transition to torsemide tomorrow given volume accumulation     ---Neuro---  # Category 2 Substance Abuse  - appreciate neuropsych evaluation  - recommend formal chemical dependency assessment  - recommend establishing care with a local psychiatrist or psychologist      ---Cardiovascular---  # Dilated NICM (LVEF 15%, LVIDd 7.7 cm) 2/2 to EtOH   # Acute on chronic HFrEF, NYHA class IV   # Cardiogenic shock   # Medication/clinic follow-up non compliance   Presented to OSH with marked volume overload and cardiogenic shock requiring multiple pressors. Decompensated HF is in setting of medication non-compliance. On admit to [facility], CI is 2.2 (on dobutamine 3 mcg/kg/min, NE 0.06 and vaso 2.4) with CVP of 8 (small collapsible IVC) after significant volume removal (70 lbs) at OSH. Consideration of advanced therapies limited by biopsy proven  cirrhosis   TTE : LVEF 5-10%, LVIDd 8.97 cm, LV apical thrombus, mod-sev MR, mod-sev TR Mild RV dysfunction,  RHC : RA 6, RV 31/6, PA 31/16 (22), PCWP 14, Luis 5.2/3.2, TD 5.4/3.3, SVR 1060, PVR 1.5 on dobutamine 3, vaso 2.4, norepi 0.02  [place] Numbers : CVP 3, PA 28/17 (24), PCWP 12, SvO2 60, CI 2.05, SVR 1634 on vaso 1, norepi 0.06,  2  [place] numbers  CVP 13, PA 36/24, PCWP 16, SvO2 43, CI/CO 2.1/3.6, SVR 1622  -volume: hypervolemic, paracentesis 3/4 2L, lasix 80mg IV x 1  -spironolactone 25mg daily--> increase to 50mg  -PEth: negative  -neuropsych evaluation: completed     # LV apical thrombus  - heparin gtt  - warfarin     # Subcutaneous ICD with hx of VT  -ICD check unremarkable     ---Renal---  # Hyponatremia, 2/2 to volume overload; significantly improved from presentation  -trend BMP      # Diuretic induced hypokalemia  - replacement per unit protocol  - spironolactone     ---GI---  # Cardiac Cirrhosis  # Portal hypertension   # Ascites s/p multiple paracentesis   # Hx alcohol abuse with alcoholic pancreatis/hepatitis    Biopsy with severe fibrosis/cardiac cirrhosis. Per hepatology: No way to definitively state his liver will have improvement after heart transplant or the impact of recovery/progression with ongoing alcohol use.  -PEth as above   -trend LFTs  -on furosemide/spironolactone     # Upper GIB  Patient with coffee ground emesis. Hb 11 > 8. S/p EGD which showed esophagitis, friable mucosa. No evidence of varices.   - 72hrs ceftriaxone for SBP prophylaxis  - continue pantoprazole 40mg bid oral  - hemoglobin checks BID     # New onset diarrhea  - check stool C Diff toxin/PCR     ---ID---  No acute issues, no growth on cx from paracentesis   -covid negative      ---Endocrine--   No acute issues     ---Hematology---  # Acute Blood Loss Anemia 2/2 UGIB  - transfuse 1u pRBCs  - trend CBC BID     ---Nutrition--   #  Severe Malnutrition  - nutrition consult  -  multivitamin     ---Dermatologic--   # Extremity Wounds  # Pressure wound (present on admission)  - care per WO recommendations     FEN: 2g Na, 1800cc fluid restricted   DVT Prophylaxis: Heparin gtt  Code Status: Full        Disposition Plan   Expected discharge: 2 - 3 days, recommended to prior living arrangement once therapeutic anticoagulation achieved.      Entered: Renzo Dorman MD 03/04/2021, 8:12 AM       The patient's care was discussed with the Attending Physician, Dr. Pratt and Patient.    Renzo Dorman MD   Cardiology Fellow  [facility] [id number] [place] [address]  Please see sign in/sign out for up to date coverage information  ______________________________________________________________________    Interval History   Slept well last night. Diarrhea resolved. Single episode of nonbloody emesis. No back pain or abdominal pain. Hemoglobin stable. Feels the best he has since admission. Ambulating without assist. No new complaints today.    Data reviewed today: I reviewed all medications, new labs and imaging results over the last 24 hours.    Physical Exam   Vital Signs: Temp: 98  F (36.7  C) Temp src: Oral BP: 98/73 Pulse: 103   Resp: 18 SpO2: 100 % O2 Device: None (Room air)    Weight: 146 lbs 12.8 oz  Constitutional: awake, alert, no apparent distress, cachectic in appearance  Eyes: Nonicteric, pupils equal  HENT: NCAT, temporal wasting  Respiratory: Nonlabored,  Decreased air movement over the b/l bases, no crackles or wheezes  Cardiovascular: RRR,  III/VI Holosystolic blowing murmur best appreciated over apex, parasternal heave, laterally displaced PMI; bounding JVD  GI: Distended abdomen, nonrigid, NT. + fluid wave  Skin: warm and dry, no rash, feet with bandages in place  Musculoskeletal: thin extremities, wwp, no peripheral edema  Neurologic: AAOx3, CN grossly intact, spontaneous movement of all extremities    Data   Recent Labs   Lab 03/04/21  0547 03/03/21  1450  03/03/21  0545 03/02/21  1641 03/02/21  1641   WBC 6.5 6.9 7.2   < >  --    HGB 7.2* 7.2* 7.6*   < >  --     101* 101*   < >  --     247 256   < >  --    INR 1.27*  --  1.19*  --  1.15*   * 128* 128*  --   --    POTASSIUM 4.1 4.1 4.3  --   --    CHLORIDE 92* 94 94  --   --    CO2 23 24 23  --   --    BUN 25 23 21  --   --    CR 0.93 0.78 0.78  --   --    ANIONGAP 9 9 10  --   --    ALEKSEY 7.8* 7.8* 8.1*  --   --    GLC 95 100* 111*  --   --    ALBUMIN 2.4*  --  2.6*  --   --    PROTTOTAL 5.2*  --  5.5*  --   --    BILITOTAL 1.3  --  1.9*  --   --    ALKPHOS 161*  --  186*  --   --    ALT 27  --  30  --   --    AST 36  --  37  --   --     < > = values in this interval not displayed.     No results found for this or any previous visit (from the past 24 hour(s)).  Medications     - MEDICATION INSTRUCTIONS -       [Held by provider] heparin Stopped (03/04/21 0640)     - MEDICATION INSTRUCTIONS -       Warfarin Therapy Reminder         folic acid  1 mg Oral Daily     furosemide  80 mg Oral BID     lidocaine  1 patch Transdermal Q24H     lidocaine   Transdermal Q8H     lisinopril  2.5 mg Oral Daily     menthol   Transdermal Q8H     multivitamin w/minerals  1 tablet Oral Daily     nicotine  1 patch Transdermal Daily     nicotine   Transdermal Q8H     pantoprazole  40 mg Oral BID AC     spironolactone  25 mg Oral Daily     vitamin B1  100 mg Oral Daily     warfarin ANTICOAGULANT  5 mg Oral ONCE at 18:00

## 2021-03-04 NOTE — PROGRESS NOTES
CLINICAL NUTRITION SERVICES - REASSESSMENT NOTE     Nutrition Prescription    RECOMMENDATIONS FOR MDs/PROVIDERS TO ORDER:  Liberalize diet order to a 3 g sodium diet if oral intake decreases.    Malnutrition Status:    Severe malnutrition in the context of acute illness/injury on chronic illness    Recommendations already ordered by Registered Dietitian (RD):  Modified oral supplements    Future/Additional Recommendations:  1. Continue current diet, as ordered, and fluid restriction. Rec small, frequent meals with early satiety. Also, encourage intake of Magic Cups (twice daily)and string cheese.   2. Continue fluid restriction as per team.  3. Rec to continue multivitamin with minerals, as ordered (etoh hx and for wound healing). Continue folic acid and thiamine, as per team.   4. Consider checking folic acid and vitamin B 12 labs. Supplement vitamin B12 if lab is low.      EVALUATION OF THE PROGRESS TOWARD GOALS   Diet: 2 g Sodium, 1.8 fluid restriction. Ordered to receive Ensure Enlive, strawberry or chocolate between meals. String cheese at HS.   Intake: Tolerating diet. Flowsheets (% intake) indicate pt with a fair appetite 2/27, 75% on 3/1, 0-100% with a good appetite 3/3, and 75% with a good appetite 3/4. Per pt, states his appetite is 100% better than a week ago and eating more. He states the Ensure Enlive oral supplements are ok if they are cold and he requests ice from the RN if these do not arrive cold. Per pt, his episode of vomiting occurred out of nowhere on 3/3 (400 mL per RN) and states he then skipped the next meal. [employer] (meal ordering system) indicates food/beverages sent 3/1-3/3 totaled 2130 kcals and 103 g protein daily on average. Meeting increased needs (for repletion and wound healing) of 1550 - 1800+ kcals/day (30 - 35 kcals/kg) and 80 - 105 grams protein/day (1.5 - 2 grams of pro/kg) when consuming the majority of meals and oral supplements. Factors affecting oral intake include NPO  "at times for tests/procedures and GI sx (including early satiety and vomiting).     NEW FINDINGS   GI: Per chart, loose and brown stools. Having one to four stools per day. Last stool noted on 3/4. Per CT abd/pelvis on 3/2: \"No abnormally distended loops of large or small bowel. The appendix is unremarkable. Diffusely thickened small and large bowel without abnormal enhancement or other evidence of bowel complication.\" Per chart, severe ascites.    WOC on 2/26: R foot wounds due to Venous Ulcer due to significant edema  Status: initial assessment. R ischial tuberosity wounds due to Pressure Injury, present on admission. Pressure injury is stage 2. Status: initial assessment. Coccyx/sacrum with distinct area of minimally blanchable redness, present on admission.\"  Weight History: Per prior RD, \"Per chart, admitted at 206 lbs (93.5 kg) on 2/17 at OSH. After significant volume removal with paracentesis and diuresis, patient discharged at 135 lbs (61.5 kg) on 2/26 at OSH. Upon admission to [facility], patient weighs 113 lbs (51.5 kg) on 2/26.\" Dry wt was reported at OSH to be 145# (65.9 kg). This admit: 51.5 kg (2/26/21), 66.6 kg (3/4) - Wt gain, but paracentesis planned for today (3/4) and pt being diuresed.     MALNUTRITION  % Intake: Not meeting this criteria.     % Weight Loss: Difficult to assess actual vs fluid wt. However, suspect significant/severe wt loss.   Subcutaneous Fat Loss: Facial region: Severe, Upper arm: Severe, Lower arm: Severe and Thoracic/intercostal: Severe  Muscle Loss: Facial & jaw region: Moderate, Scapular bone: Severe, Thoracic region (clavicle, acromium bone, deltoid, trapezius, pectoral): Severe, Upper arm (bicep, tricep): Severe, Lower arm  (forearm): Severe, Dorsal hand: Severe, Upper leg (quadricep, hamstring): Severe, Patellar region: Severe and Posterior calf: Severe  Fluid Accumulation/Edema: Per chart, no peripheral edema but pt with severe ascites  Malnutrition Diagnosis: Severe " malnutrition in the context of acute illness/injury on chronic illness    Previous Goals   Diet adv within 1-2 days.  Evaluation: Met.    Patient to consume % of nutritionally adequate meal trays TID, or the equivalent with supplements/snacks.  Evaluation: Meeting at times, but not consistently.    Previous Nutrition Diagnosis  Inadequate oral intake related to reduced appetite 2/2 diet restrictions at OSH and chronic illness as evidenced by pt reports poor intake x9 days, observed severe fat and muscle wasting with BMI of 16    Evaluation: Improving. Changed to new nutrition dx below.    CURRENT NUTRITION DIAGNOSIS  Increased nutrient needs (protein/energy) related to increased protein and energy need for wound healing and for repletion as evidenced by pt requiring 1550 - 1800+ kcals/day (30 - 35 kcals/kg) and 80 - 105 grams protein/day (1.5 - 2 grams of pro/kg).    INTERVENTIONS  Implementation  Nutrition education for nutrition relationship to health/disease: Encouraged intake of tolerated foods/beverages. Rec antiemetics/antinauseants prior to meals, if needed.  Medical food supplement therapy: Discontinued Ensure Enlive as pt would like to try Magic Cup for awhile. Modified oral supplements to send strawberry Magic Cup at 10:00; chocolate Magic Cup + string cheese at HS daily.    Goals  Patient to consume % of nutritionally adequate meal trays TID, or the equivalent with supplements/snacks.    Monitoring/Evaluation  Progress toward goals will be monitored and evaluated per protocol.     Nutrition will continue to follow.      Bisi Monroe, MS, RD, LD, [facility]   6C Pgr: [phone]

## 2021-03-05 ENCOUNTER — APPOINTMENT (OUTPATIENT)
Dept: OCCUPATIONAL THERAPY | Facility: CLINIC | Age: 36
End: 2021-03-05
Attending: INTERNAL MEDICINE
Payer: MEDICAID

## 2021-03-05 ENCOUNTER — APPOINTMENT (OUTPATIENT)
Dept: CT IMAGING | Facility: CLINIC | Age: 36
End: 2021-03-05
Payer: MEDICAID

## 2021-03-05 LAB
ALBUMIN SERPL-MCNC: 2.7 G/DL (ref 3.4–5)
ALBUMIN UR-MCNC: NEGATIVE MG/DL
ALP SERPL-CCNC: 143 U/L (ref 40–150)
ALT SERPL W P-5'-P-CCNC: 26 U/L (ref 0–70)
AMMONIA PLAS-SCNC: 23 UMOL/L (ref 10–50)
ANION GAP SERPL CALCULATED.3IONS-SCNC: 6 MMOL/L (ref 3–14)
ANION GAP SERPL CALCULATED.3IONS-SCNC: 7 MMOL/L (ref 3–14)
ANION GAP SERPL CALCULATED.3IONS-SCNC: 7 MMOL/L (ref 3–14)
APPEARANCE UR: CLEAR
AST SERPL W P-5'-P-CCNC: 32 U/L (ref 0–45)
BILIRUB SERPL-MCNC: 2.3 MG/DL (ref 0.2–1.3)
BILIRUB UR QL STRIP: NEGATIVE
BUN SERPL-MCNC: 16 MG/DL (ref 7–30)
BUN SERPL-MCNC: 19 MG/DL (ref 7–30)
BUN SERPL-MCNC: 23 MG/DL (ref 7–30)
CALCIUM SERPL-MCNC: 7.8 MG/DL (ref 8.5–10.1)
CALCIUM SERPL-MCNC: 7.9 MG/DL (ref 8.5–10.1)
CALCIUM SERPL-MCNC: 8 MG/DL (ref 8.5–10.1)
CHLORIDE SERPL-SCNC: 90 MMOL/L (ref 94–109)
CHLORIDE SERPL-SCNC: 90 MMOL/L (ref 94–109)
CHLORIDE SERPL-SCNC: 91 MMOL/L (ref 94–109)
CO2 SERPL-SCNC: 25 MMOL/L (ref 20–32)
CO2 SERPL-SCNC: 26 MMOL/L (ref 20–32)
CO2 SERPL-SCNC: 27 MMOL/L (ref 20–32)
COLOR UR AUTO: NORMAL
CREAT SERPL-MCNC: 0.79 MG/DL (ref 0.66–1.25)
CREAT SERPL-MCNC: 0.82 MG/DL (ref 0.66–1.25)
CREAT SERPL-MCNC: 0.87 MG/DL (ref 0.66–1.25)
ERYTHROCYTE [DISTWIDTH] IN BLOOD BY AUTOMATED COUNT: 17.2 % (ref 10–15)
ERYTHROCYTE [DISTWIDTH] IN BLOOD BY AUTOMATED COUNT: 17.4 % (ref 10–15)
GFR SERPL CREATININE-BSD FRML MDRD: >90 ML/MIN/{1.73_M2}
GLUCOSE BLDC GLUCOMTR-MCNC: 98 MG/DL (ref 70–99)
GLUCOSE SERPL-MCNC: 87 MG/DL (ref 70–99)
GLUCOSE SERPL-MCNC: 88 MG/DL (ref 70–99)
GLUCOSE SERPL-MCNC: 97 MG/DL (ref 70–99)
GLUCOSE UR STRIP-MCNC: NEGATIVE MG/DL
HCT VFR BLD AUTO: 23.9 % (ref 40–53)
HCT VFR BLD AUTO: 24.9 % (ref 40–53)
HGB BLD-MCNC: 7.7 G/DL (ref 13.3–17.7)
HGB BLD-MCNC: 8 G/DL (ref 13.3–17.7)
HGB UR QL STRIP: NEGATIVE
INR PPP: 1.26 (ref 0.86–1.14)
KETONES UR STRIP-MCNC: NEGATIVE MG/DL
LEUKOCYTE ESTERASE UR QL STRIP: NEGATIVE
MAGNESIUM SERPL-MCNC: 2 MG/DL (ref 1.6–2.3)
MCH RBC QN AUTO: 31.4 PG (ref 26.5–33)
MCH RBC QN AUTO: 31.9 PG (ref 26.5–33)
MCHC RBC AUTO-ENTMCNC: 32.1 G/DL (ref 31.5–36.5)
MCHC RBC AUTO-ENTMCNC: 32.2 G/DL (ref 31.5–36.5)
MCV RBC AUTO: 98 FL (ref 78–100)
MCV RBC AUTO: 99 FL (ref 78–100)
NITRATE UR QL: NEGATIVE
PH UR STRIP: 6 PH (ref 5–7)
PLATELET # BLD AUTO: 260 10E9/L (ref 150–450)
PLATELET # BLD AUTO: 284 10E9/L (ref 150–450)
POTASSIUM SERPL-SCNC: 4 MMOL/L (ref 3.4–5.3)
POTASSIUM SERPL-SCNC: 4.4 MMOL/L (ref 3.4–5.3)
POTASSIUM SERPL-SCNC: 4.5 MMOL/L (ref 3.4–5.3)
PROT SERPL-MCNC: 5.3 G/DL (ref 6.8–8.8)
RBC # BLD AUTO: 2.45 10E12/L (ref 4.4–5.9)
RBC # BLD AUTO: 2.51 10E12/L (ref 4.4–5.9)
RBC #/AREA URNS AUTO: <1 /HPF (ref 0–2)
SODIUM SERPL-SCNC: 123 MMOL/L (ref 133–144)
SODIUM SERPL-SCNC: 123 MMOL/L (ref 133–144)
SODIUM SERPL-SCNC: 125 MMOL/L (ref 133–144)
SOURCE: NORMAL
SP GR UR STRIP: 1 (ref 1–1.03)
SQUAMOUS #/AREA URNS AUTO: <1 /HPF (ref 0–1)
UFH PPP CHRO-ACNC: 0.34 IU/ML
UFH PPP CHRO-ACNC: 0.42 IU/ML
UROBILINOGEN UR STRIP-MCNC: NORMAL MG/DL (ref 0–2)
WBC # BLD AUTO: 6.5 10E9/L (ref 4–11)
WBC # BLD AUTO: 7.1 10E9/L (ref 4–11)
WBC #/AREA URNS AUTO: <1 /HPF (ref 0–5)

## 2021-03-05 PROCEDURE — 70450 CT HEAD/BRAIN W/O DYE: CPT | Mod: 26 | Performed by: RADIOLOGY

## 2021-03-05 PROCEDURE — 999N001017 HC STATISTIC GLUCOSE BY METER IP

## 2021-03-05 PROCEDURE — 250N000013 HC RX MED GY IP 250 OP 250 PS 637: Performed by: STUDENT IN AN ORGANIZED HEALTH CARE EDUCATION/TRAINING PROGRAM

## 2021-03-05 PROCEDURE — 83735 ASSAY OF MAGNESIUM: CPT | Performed by: INTERNAL MEDICINE

## 2021-03-05 PROCEDURE — 82140 ASSAY OF AMMONIA: CPT | Performed by: STUDENT IN AN ORGANIZED HEALTH CARE EDUCATION/TRAINING PROGRAM

## 2021-03-05 PROCEDURE — 85027 COMPLETE CBC AUTOMATED: CPT | Performed by: INTERNAL MEDICINE

## 2021-03-05 PROCEDURE — P9041 ALBUMIN (HUMAN),5%, 50ML: HCPCS | Performed by: STUDENT IN AN ORGANIZED HEALTH CARE EDUCATION/TRAINING PROGRAM

## 2021-03-05 PROCEDURE — 80053 COMPREHEN METABOLIC PANEL: CPT | Performed by: INTERNAL MEDICINE

## 2021-03-05 PROCEDURE — 85520 HEPARIN ASSAY: CPT | Performed by: INTERNAL MEDICINE

## 2021-03-05 PROCEDURE — 80048 BASIC METABOLIC PNL TOTAL CA: CPT | Performed by: STUDENT IN AN ORGANIZED HEALTH CARE EDUCATION/TRAINING PROGRAM

## 2021-03-05 PROCEDURE — 97535 SELF CARE MNGMENT TRAINING: CPT | Mod: GO

## 2021-03-05 PROCEDURE — 250N000013 HC RX MED GY IP 250 OP 250 PS 637: Performed by: INTERNAL MEDICINE

## 2021-03-05 PROCEDURE — 85610 PROTHROMBIN TIME: CPT | Performed by: INTERNAL MEDICINE

## 2021-03-05 PROCEDURE — 99233 SBSQ HOSP IP/OBS HIGH 50: CPT | Mod: GC | Performed by: INTERNAL MEDICINE

## 2021-03-05 PROCEDURE — 250N000011 HC RX IP 250 OP 636: Performed by: STUDENT IN AN ORGANIZED HEALTH CARE EDUCATION/TRAINING PROGRAM

## 2021-03-05 PROCEDURE — 81001 URINALYSIS AUTO W/SCOPE: CPT | Performed by: STUDENT IN AN ORGANIZED HEALTH CARE EDUCATION/TRAINING PROGRAM

## 2021-03-05 PROCEDURE — 250N000011 HC RX IP 250 OP 636

## 2021-03-05 PROCEDURE — 70450 CT HEAD/BRAIN W/O DYE: CPT

## 2021-03-05 PROCEDURE — 36592 COLLECT BLOOD FROM PICC: CPT | Performed by: STUDENT IN AN ORGANIZED HEALTH CARE EDUCATION/TRAINING PROGRAM

## 2021-03-05 PROCEDURE — 36592 COLLECT BLOOD FROM PICC: CPT | Performed by: INTERNAL MEDICINE

## 2021-03-05 PROCEDURE — 214N000001 HC R&B CCU UMMC

## 2021-03-05 PROCEDURE — 250N000013 HC RX MED GY IP 250 OP 250 PS 637

## 2021-03-05 PROCEDURE — 36415 COLL VENOUS BLD VENIPUNCTURE: CPT | Performed by: INTERNAL MEDICINE

## 2021-03-05 PROCEDURE — G0463 HOSPITAL OUTPT CLINIC VISIT: HCPCS

## 2021-03-05 PROCEDURE — 97110 THERAPEUTIC EXERCISES: CPT | Mod: GO

## 2021-03-05 PROCEDURE — 80048 BASIC METABOLIC PNL TOTAL CA: CPT | Performed by: INTERNAL MEDICINE

## 2021-03-05 RX ORDER — TORSEMIDE 20 MG/1
80 TABLET ORAL
Status: DISCONTINUED | OUTPATIENT
Start: 2021-03-06 | End: 2021-03-09

## 2021-03-05 RX ORDER — MAGNESIUM OXIDE 400 MG/1
400 TABLET ORAL 2 TIMES DAILY
Status: COMPLETED | OUTPATIENT
Start: 2021-03-05 | End: 2021-03-07

## 2021-03-05 RX ORDER — WARFARIN SODIUM 7.5 MG/1
7.5 TABLET ORAL
Status: COMPLETED | OUTPATIENT
Start: 2021-03-05 | End: 2021-03-05

## 2021-03-05 RX ADMIN — TORSEMIDE 40 MG: 20 TABLET ORAL at 15:52

## 2021-03-05 RX ADMIN — SPIRONOLACTONE 50 MG: 25 TABLET, FILM COATED ORAL at 08:51

## 2021-03-05 RX ADMIN — NICOTINE 7 MG/24 HR DAILY TRANSDERMAL PATCH 1 PATCH: at 08:57

## 2021-03-05 RX ADMIN — HEPARIN SODIUM 1450 UNITS/HR: 10000 INJECTION, SOLUTION INTRAVENOUS at 06:06

## 2021-03-05 RX ADMIN — THIAMINE HCL TAB 100 MG 100 MG: 100 TAB at 08:51

## 2021-03-05 RX ADMIN — HYDROXYZINE HYDROCHLORIDE 25 MG: 25 TABLET, FILM COATED ORAL at 21:17

## 2021-03-05 RX ADMIN — OXYCODONE HYDROCHLORIDE 5 MG: 5 TABLET ORAL at 01:03

## 2021-03-05 RX ADMIN — TORSEMIDE 40 MG: 20 TABLET ORAL at 08:51

## 2021-03-05 RX ADMIN — PANTOPRAZOLE SODIUM 40 MG: 40 TABLET, DELAYED RELEASE ORAL at 15:52

## 2021-03-05 RX ADMIN — ACETAMINOPHEN 650 MG: 325 TABLET, FILM COATED ORAL at 01:03

## 2021-03-05 RX ADMIN — Medication 400 MG: at 20:01

## 2021-03-05 RX ADMIN — MELATONIN TAB 3 MG 3 MG: 3 TAB at 21:28

## 2021-03-05 RX ADMIN — MULTIPLE VITAMINS W/ MINERALS TAB 1 TABLET: TAB at 08:50

## 2021-03-05 RX ADMIN — WARFARIN SODIUM 7.5 MG: 7.5 TABLET ORAL at 18:06

## 2021-03-05 RX ADMIN — ALBUMIN HUMAN 25 G: 0.05 INJECTION, SOLUTION INTRAVENOUS at 00:45

## 2021-03-05 RX ADMIN — PANTOPRAZOLE SODIUM 40 MG: 40 TABLET, DELAYED RELEASE ORAL at 08:50

## 2021-03-05 RX ADMIN — FOLIC ACID 1 MG: 1 TABLET ORAL at 08:51

## 2021-03-05 RX ADMIN — ENOXAPARIN SODIUM 50 MG: 60 INJECTION SUBCUTANEOUS at 20:01

## 2021-03-05 RX ADMIN — ACETAMINOPHEN 650 MG: 325 TABLET, FILM COATED ORAL at 21:29

## 2021-03-05 ASSESSMENT — ACTIVITIES OF DAILY LIVING (ADL)
ADLS_ACUITY_SCORE: 16
ADLS_ACUITY_SCORE: 17

## 2021-03-05 NOTE — PROGRESS NOTES
Pt admitted 2/25 from OSH for advanced cardiac therapies. At previous hospital admitted for cardiogenic skock, AC, and FVO. PMH marijuana use, tobacco use, VT s/p ICD, EtOH abuse, NICM, and liver disease, cardiac cirrhosis.     Neuro: A&Ox4; hyponatremic   Cardiac: Afebrile, SR, Low BP's (MAP still above 65), MD aware.    Respiratory: RA   GI/: Voiding spontaneously. No BM this shift.   Diet/appetite: Tolerating 2g sodium, 1.8L FR diet; Pt maxed on FR now until midnight. Denies nausea   Activity: Up with 1 assist    Pain: C/o some back/coccyx pain, pt declines pain therapies other than rest/repositioning.    Skin: Foot wound; dressing change every other day; mepilex coccyx every third day. Always float heels in bed, reposition q2h.   Lines: DL PICC, R PIV.   Drains: none   Replacement: None       Strict I/O's.     Goal to get INR >2.     Pt has been resting comfortably, will continue to monitor and follow plan of care. Cards 2 pt.

## 2021-03-05 NOTE — PLAN OF CARE
Patient with NICM admitted to OSH for volume overload, acute on chronic HFrEF, cardiogenic shock and AC requiring transfer to [facility] on 2/26/21 for evaluation of advanced cardiac therapies.    Neuro: A&O x4.  Cardiac: SR. Blood pressure still low after 1 unit of blood, albumin ordered and given. Systolic BP after albumin infusion remains low, 70's-90's with MAP 60's-70's. Patient asymptomatic, providers aware.  Respiratory: Room air.  GI/: Adequate urine output. LBM 3/3. Severe ascites.  Diet: 2g Na diet, 1.8L fluid restriction.  Skin: Coccyx wound with mepilex dressing CDI. Right foot ulcer with mepilex dressing CDI.  LDAs:  Right double lumen PICC, do not use occluded lumen. Right PIV with heparin infusing at 1450 units/hr. 10a level therapeutic x1, next draw at 10:15am.  Activity: SBA.  Pain: PRN tylenol q4 and PRN oxy q4 for back and coccyx pain.     Plan: Continue to monitor. Bridge from heparin to warfarin, goal INR >2. Warfarin education 3/7 at 1:15pm.

## 2021-03-05 NOTE — SIGNIFICANT EVENT
Patient put on call light at 0158 and was found on the floor. Patient stated he was reaching for an item on his bedside table and slipped off of the bed. Patient stated that he fell on his butt and hit his elbows. Patient denied hitting his head, feeling dizzy or light headed prior to the fall, or any current pain. Patient's vitals were taken and skin was assessed, no open wounds or scrapes noted. Chastity Pena MD notified and will see the patient bedside.

## 2021-03-05 NOTE — PROGRESS NOTES
[facility] Nurse Inpatient Wound Assessment    Reason for consultation: Evaluate and treat  R foot and coccyx wounds    Assessment  R foot wounds due to Venous Ulcer due to significant edema   Status: evolving, cleaning up with use of medihoney, macerated to periwound, added barrier film and removed vaseline gauze.      R ischial tuberosity wounds due to Pressure Injury, present on admission  Pressure injury is stage 2  Status: resolved    Coccyx/sacrum with distinct area of minimally blanchable redness, present on admission   Now with epidermal stripping over area of minimally blanching redness, evolving pressure injury vs friction  Status: evolving    Treatment Plan  R foot wounds: Every other day cleanse with microKlenz and dry, apply Cavilon barrier film to skin around wound and let dry. Apply medihoney nickel thick to mepilex and place over wound ensuring that medihoney is over wound bed. Ensure heels elevated off mattress at all times when in bed.      Coccyx: Every third day cleanse with microKlenz and dry, apply Cavilon no sting barrier film to skin around wound and let dry, then place mepilex. Turns q2hrs, lift foot of bed prior to lifting head of bed to reduce shearing to sacrum, chair cushion use when up to chair.       Orders Updated    WOC Nurse follow-up plan:weekly  Nursing to notify the Provider(s) and re-consult the WO Nurse if wound(s) deteriorates or new skin concern.    Patient History  According to provider note(s):  Miguel Ángel Henson is a 35 year old male with a PMHx of NICM (LVEF 15%) 2/2 to EtOH abuse, subcutaneous ICD with hx of VT and tobacco abuse who presented to OSH 2/17/20 profoundly volume overloaded with acute on chronic HFrEF, cardiogenic shock and AC requiring transfer to [facility] 2/26/2021 for consideration of advanced therapies.     Objective Data  Containment of urine/stool: Indwelling catheter    Active Diet Order  Orders Placed This Encounter      2 Gram Sodium Diet      Output:   I/O  last 3 completed shifts:  In: 1935 [P.O.:1720; I.V.:20]  Out: 825 [Urine:825]    Risk Assessment:   Sensory Perception: 4-->no impairment  Moisture: 4-->rarely moist  Activity: 3-->walks occasionally  Mobility: 4-->no limitation  Nutrition: 3-->adequate  Friction and Shear: 2-->potential problem  Hermilo Score: 20                          Labs:   Recent Labs   Lab 03/05/21  0329   ALBUMIN 2.7*   HGB 8.0*   INR 1.26*   WBC 7.1       Physical Exam  Areas of skin assessed: focused R foot and buttock     Wound Location:  R dorsal foot  Date of last photo 2/26  Wound History: patient had severe LE edema which caused this wound to develop per patient         Wound Base: 100 % slough with granular buds      Palpation of the wound bed: normal      Drainage: moderate     Description of drainage: serosanguinous     Measurements (length x width x depth, in cm) 2.5cm  x 3.5cm  x  Unknown due to fibrin     Tunneling N/A     Undermining N/A  Periwound skin: macerated      Color: red      Temperature: normal   Odor: none  Pain: moderate, tender        Wound Location:  R ischial tuberosity and coccyx   Date of last photo 2/26  Wound History: present on admission, developed at outside hospital         Wound Base: 100 % blanchable , erythema and very superficial dermis (epidermal stripping barely into dermis)      Palpation of the wound bed: normal      Drainage: small     Description of drainage: serous     Measurements (length x width x depth, in cm) R IT: resurfaced   Coccyx 1.3cm x 1.2cm x 0.01cm     Tunneling N/A     Undermining N/A  Periwound skin: erythema- blanchable      Color: red      Temperature: normal   Odor: none  Pain: mild, tender    Interventions  Visual inspection and assessment completed   Wound Care Rationale Provide selective debridement (autolysis) of nonviable tissue  and Decrease bacterial load  Wound Care: done per plan of care  Supplies: at bedside  Current off-loading measures: Pillows  Current support  surface: Standard  Low air loss mattress  Education provided to: importance of repositioning and plan of care  Discussed plan of care with Patient and Nurse    Rosalind Edgar RN, CWOCN

## 2021-03-05 NOTE — PROGRESS NOTES
[facility]    Cardiology Progress Note- Cards 2        Date of Admission:  2/26/2021     Assessment & Plan: HVSL   Miguel Ángel Henson is a 35 year old male with a PMHx of NICM (LVEF 15%) 2/2 to EtOH abuse, subcutaneous ICD with hx of VT and tobacco abuse who presented to OSH 2/17/20 profoundly volume overloaded with acute on chronic HFrEF, cardiogenic shock and AC requiring transfer to [facility] 2/26/2021 for consideration of advanced therapies. His cardiogenic shock has improved and he is hemodynamically stable on an oral regimen. Liver biopsy has shown fibrosis consistent with cardiac cirrhosis which is prohibitive for advanced cardiac therapies. His hospital course has otherwise been complicated by UGIB due to esophagitis.     Today's Plan:  - switch heparin gtt to lovenox, continue warfarin  - torsemide 40mg BID, tomorrow increase to 80 TID      ---Neuro---  # Category 2 Substance Abuse  - appreciate neuropsych evaluation  - recommend formal chemical dependency assessment  - recommend establishing care with a local psychiatrist or psychologist      ---Cardiovascular---  # Dilated NICM (LVEF 15%, LVIDd 7.7 cm) 2/2 to EtOH   # Acute on chronic HFrEF, NYHA class IV   # Cardiogenic shock   # Medication/clinic follow-up non compliance   Presented to OSH with marked volume overload and cardiogenic shock requiring multiple pressors. Decompensated HF is in setting of medication non-compliance. On admit to [facility], CI is 2.2 (on dobutamine 3 mcg/kg/min, NE 0.06 and vaso 2.4) with CVP of 8 (small collapsible IVC) after significant volume removal (70 lbs) at OSH. Consideration of advanced therapies limited by biopsy proven cirrhosis   TTE 2/27: LVEF 5-10%, LVIDd 8.97 cm, LV apical thrombus, mod-sev MR, mod-sev TR Mild RV dysfunction,  RHC 2/27: RA 6, RV 31/6, PA 31/16 (22), PCWP 14, Luis 5.2/3.2, TD 5.4/3.3, SVR 1060, PVR 1.5 on dobutamine 3, vaso 2.4, norepi 0.02  Rodolfo Young  : CVP 3, PA 28/17 (24), PCWP 12, SvO2 60, CI 2.05, SVR 1634 on vaso 1, norepi 0.06,  2  [place] numbers  CVP 13, PA 36/24, PCWP 16, SvO2 43, CI/CO 2.1/3.6, SVR 1622  -volume: hypervolemic, paracentesis 3/4 2L, start torsemide 40mg BID  -spironolactone 50mg BID  -PEth: negative  -neuropsych evaluation: completed     # LV apical thrombus  - heparin gtt--> lovenox  - warfarin     # Subcutaneous ICD with hx of VT  -ICD check unremarkable     ---Renal---  # Hyponatremia, 2/2 to volume overload; significantly improved from presentation though slowly worsening with volume accumulation  -trend BMP   - diuresis as above     # Diuretic induced hypokalemia  - replacement per unit protocol  - spironolactone as above     ---GI---  # Cardiac Cirrhosis  # Portal hypertension   # Ascites s/p multiple paracentesis   # Hx alcohol abuse with alcoholic pancreatis/hepatitis    Biopsy with severe fibrosis/cardiac cirrhosis. Per hepatology: No way to definitively state his liver will have improvement after heart transplant or the impact of recovery/progression with ongoing alcohol use.  -PEth as above   -trend LFTs  -referral to GI as outpatient for ongoing management     # Upper GIB  Patient with coffee ground emesis. Hb 11 > 8. S/p EGD which showed esophagitis, friable mucosa. No evidence of varices.   - 72hrs ceftriaxone for SBP prophylaxis  - continue pantoprazole 40mg bid oral  - hemoglobin checks BID  - s/p 1u pRBC3/4     # New onset diarrhea  - check stool C Diff toxin/PCR     ---ID---  No acute issues, no growth on cx from paracentesis   -covid negative      ---Endocrine--   No acute issues     ---Hematology---  # Acute Blood Loss Anemia 2/2 UGIB  - transfuse 1u pRBCs  - trend CBC BID     ---Nutrition--   #  Severe Malnutrition  - nutrition consult  - multivitamin     ---Dermatologic--   # Extremity Wounds  # Pressure wound (present on admission)  - care per WOC recommendations     FEN: 2g Na, 1800cc fluid restricted   DVT  Prophylaxis: Heparin gtt-->lovenox  Code Status: Full         Disposition Plan   Expected discharge: 2 - 3 days, recommended to prior living arrangement once therapeutic anticoagulation achieved.      Entered: Renzo Dorman MD 03/05/2021, 8:01 AM       The patient's care was discussed with the Attending Physician, Dr. Ayala.    Renzo Dorman MD   Cardiology Fellow  [facility] [id number]  [facility]  Please see sign in/sign out for up to date coverage information  ______________________________________________________________________    Interval History   Was hypotensive again after paracentesis. Given albumin infusion in addition to 1u pRBCs. Slept Ok. No pain. No n/v/d. Denies lightheadedness or dizziness.     Data reviewed today: I reviewed all medications, new labs and imaging results over the last 24 hours.     Physical Exam   Vital Signs: Temp: 98  F (36.7  C) Temp src: Oral BP: 104/64 Pulse: 86   Resp: 18 SpO2: 100 % O2 Device: None (Room air)    Weight: 146 lbs 12.8 oz  Constitutional: awake, alert, no apparent distress, cachectic in appearance  Eyes: Nonicteric, pupils equal  HENT: NCAT, temporal wasting  Respiratory: Nonlabored,  Decreased air movement over the b/l bases, no crackles or wheezes  Cardiovascular: RRR,  III/VI Holosystolic blowing murmur best appreciated over apex, parasternal heave, laterally displaced PMI; bounding JVD to ear lobe  GI: Distended abdomen, nonrigid, NT. + fluid wave  Skin: warm and dry, no rash, feet with bandages in place  Musculoskeletal: thin extremities, wwp, no peripheral edema  Neurologic: AAOx3, CN grossly intact, spontaneous movement of all extremities    Data   Recent Labs   Lab 03/05/21  1433 03/05/21  0329 03/04/21  1531 03/04/21  0547 03/04/21  0547 03/03/21  0545 03/03/21  0545   WBC 6.5 7.1 7.0   < > 6.5   < > 7.2   HGB 7.7* 8.0* 7.4*   < > 7.2*   < > 7.6*   MCV 98 99 100   < > 100   < > 101*    284 282   < > 258    < > 256   INR  --  1.26*  --   --  1.27*  --  1.19*   * 123* 123*  --  124*   < > 128*   POTASSIUM 4.5 4.4 4.6  --  4.1   < > 4.3   CHLORIDE 91* 90* 90*  --  92*   < > 94   CO2 25 26 23  --  23   < > 23   BUN 19 23 26  --  25   < > 21   CR 0.82 0.87 1.03  --  0.93   < > 0.78   ANIONGAP 7 6 10  --  9   < > 10   ALEKSEY 7.8* 7.9* 8.1*  --  7.8*   < > 8.1*   GLC 88 97 103*  --  95   < > 111*   ALBUMIN  --  2.7*  --   --  2.4*  --  2.6*   PROTTOTAL  --  5.3*  --   --  5.2*  --  5.5*   BILITOTAL  --  2.3*  --   --  1.3  --  1.9*   ALKPHOS  --  143  --   --  161*  --  186*   ALT  --  26  --   --  27  --  30   AST  --  32  --   --  36  --  37    < > = values in this interval not displayed.     No results found for this or any previous visit (from the past 24 hour(s)).  Medications     - MEDICATION INSTRUCTIONS -       - MEDICATION INSTRUCTIONS -       Warfarin Therapy Reminder         enoxaparin ANTICOAGULANT  1 mg/kg (Dosing Weight) Subcutaneous Q12H     folic acid  1 mg Oral Daily     lidocaine  1 patch Transdermal Q24H     lidocaine   Transdermal Q8H     magnesium oxide  400 mg Oral BID     menthol   Transdermal Q8H     multivitamin w/minerals  1 tablet Oral Daily     nicotine  1 patch Transdermal Daily     nicotine   Transdermal Q8H     pantoprazole  40 mg Oral BID AC     spironolactone  50 mg Oral Daily     vitamin B1  100 mg Oral Daily     [START ON 3/6/2021] torsemide  80 mg Oral TID

## 2021-03-06 LAB
ALBUMIN SERPL-MCNC: 2.4 G/DL (ref 3.4–5)
ALP SERPL-CCNC: 137 U/L (ref 40–150)
ALT SERPL W P-5'-P-CCNC: 22 U/L (ref 0–70)
ANION GAP SERPL CALCULATED.3IONS-SCNC: 7 MMOL/L (ref 3–14)
ANION GAP SERPL CALCULATED.3IONS-SCNC: 7 MMOL/L (ref 3–14)
AST SERPL W P-5'-P-CCNC: 29 U/L (ref 0–45)
BACTERIA SPEC CULT: NORMAL
BASE EXCESS BLDV CALC-SCNC: 4.7 MMOL/L
BASE EXCESS BLDV CALC-SCNC: 6.3 MMOL/L
BILIRUB SERPL-MCNC: 1.5 MG/DL (ref 0.2–1.3)
BUN SERPL-MCNC: 13 MG/DL (ref 7–30)
BUN SERPL-MCNC: 15 MG/DL (ref 7–30)
CALCIUM SERPL-MCNC: 7.9 MG/DL (ref 8.5–10.1)
CALCIUM SERPL-MCNC: 8.4 MG/DL (ref 8.5–10.1)
CHLORIDE SERPL-SCNC: 93 MMOL/L (ref 94–109)
CHLORIDE SERPL-SCNC: 93 MMOL/L (ref 94–109)
CO2 SERPL-SCNC: 28 MMOL/L (ref 20–32)
CO2 SERPL-SCNC: 28 MMOL/L (ref 20–32)
CREAT SERPL-MCNC: 0.7 MG/DL (ref 0.66–1.25)
CREAT SERPL-MCNC: 0.73 MG/DL (ref 0.66–1.25)
ERYTHROCYTE [DISTWIDTH] IN BLOOD BY AUTOMATED COUNT: 16.7 % (ref 10–15)
ERYTHROCYTE [DISTWIDTH] IN BLOOD BY AUTOMATED COUNT: 17 % (ref 10–15)
FERRITIN SERPL-MCNC: 159 NG/ML (ref 26–388)
GFR SERPL CREATININE-BSD FRML MDRD: >90 ML/MIN/{1.73_M2}
GFR SERPL CREATININE-BSD FRML MDRD: >90 ML/MIN/{1.73_M2}
GLUCOSE SERPL-MCNC: 115 MG/DL (ref 70–99)
GLUCOSE SERPL-MCNC: 81 MG/DL (ref 70–99)
HCO3 BLDV-SCNC: 30 MMOL/L (ref 21–28)
HCO3 BLDV-SCNC: 31 MMOL/L (ref 21–28)
HCT VFR BLD AUTO: 27 % (ref 40–53)
HCT VFR BLD AUTO: 30 % (ref 40–53)
HGB BLD-MCNC: 8.9 G/DL (ref 13.3–17.7)
HGB BLD-MCNC: 9.7 G/DL (ref 13.3–17.7)
INR PPP: 1.25 (ref 0.86–1.14)
Lab: NORMAL
MAGNESIUM SERPL-MCNC: 1.7 MG/DL (ref 1.6–2.3)
MCH RBC QN AUTO: 31.3 PG (ref 26.5–33)
MCH RBC QN AUTO: 32.1 PG (ref 26.5–33)
MCHC RBC AUTO-ENTMCNC: 32.3 G/DL (ref 31.5–36.5)
MCHC RBC AUTO-ENTMCNC: 33 G/DL (ref 31.5–36.5)
MCV RBC AUTO: 97 FL (ref 78–100)
MCV RBC AUTO: 98 FL (ref 78–100)
O2/TOTAL GAS SETTING VFR VENT: ABNORMAL %
O2/TOTAL GAS SETTING VFR VENT: ABNORMAL %
OXYHGB MFR BLDV: 45 %
OXYHGB MFR BLDV: 58 %
PCO2 BLDV: 44 MM HG (ref 40–50)
PCO2 BLDV: 47 MM HG (ref 40–50)
PH BLDV: 7.42 PH (ref 7.32–7.43)
PH BLDV: 7.45 PH (ref 7.32–7.43)
PLATELET # BLD AUTO: 282 10E9/L (ref 150–450)
PLATELET # BLD AUTO: 350 10E9/L (ref 150–450)
PO2 BLDV: 28 MM HG (ref 25–47)
PO2 BLDV: 34 MM HG (ref 25–47)
POTASSIUM SERPL-SCNC: 3.6 MMOL/L (ref 3.4–5.3)
POTASSIUM SERPL-SCNC: 3.6 MMOL/L (ref 3.4–5.3)
PROT SERPL-MCNC: 5 G/DL (ref 6.8–8.8)
RBC # BLD AUTO: 2.77 10E12/L (ref 4.4–5.9)
RBC # BLD AUTO: 3.1 10E12/L (ref 4.4–5.9)
SODIUM SERPL-SCNC: 127 MMOL/L (ref 133–144)
SODIUM SERPL-SCNC: 128 MMOL/L (ref 133–144)
SPECIMEN SOURCE: NORMAL
TROPONIN I SERPL-MCNC: 0.04 UG/L (ref 0–0.04)
UFH PPP CHRO-ACNC: 0.14 IU/ML
WBC # BLD AUTO: 5.4 10E9/L (ref 4–11)
WBC # BLD AUTO: 8.3 10E9/L (ref 4–11)

## 2021-03-06 PROCEDURE — 250N000011 HC RX IP 250 OP 636: Performed by: NURSE PRACTITIONER

## 2021-03-06 PROCEDURE — 85027 COMPLETE CBC AUTOMATED: CPT | Performed by: INTERNAL MEDICINE

## 2021-03-06 PROCEDURE — 36592 COLLECT BLOOD FROM PICC: CPT | Performed by: STUDENT IN AN ORGANIZED HEALTH CARE EDUCATION/TRAINING PROGRAM

## 2021-03-06 PROCEDURE — 36592 COLLECT BLOOD FROM PICC: CPT | Performed by: INTERNAL MEDICINE

## 2021-03-06 PROCEDURE — 84484 ASSAY OF TROPONIN QUANT: CPT | Performed by: STUDENT IN AN ORGANIZED HEALTH CARE EDUCATION/TRAINING PROGRAM

## 2021-03-06 PROCEDURE — 80048 BASIC METABOLIC PNL TOTAL CA: CPT | Performed by: INTERNAL MEDICINE

## 2021-03-06 PROCEDURE — 250N000011 HC RX IP 250 OP 636

## 2021-03-06 PROCEDURE — 250N000013 HC RX MED GY IP 250 OP 250 PS 637: Performed by: STUDENT IN AN ORGANIZED HEALTH CARE EDUCATION/TRAINING PROGRAM

## 2021-03-06 PROCEDURE — 82805 BLOOD GASES W/O2 SATURATION: CPT | Performed by: STUDENT IN AN ORGANIZED HEALTH CARE EDUCATION/TRAINING PROGRAM

## 2021-03-06 PROCEDURE — 36415 COLL VENOUS BLD VENIPUNCTURE: CPT | Performed by: STUDENT IN AN ORGANIZED HEALTH CARE EDUCATION/TRAINING PROGRAM

## 2021-03-06 PROCEDURE — 93005 ELECTROCARDIOGRAM TRACING: CPT

## 2021-03-06 PROCEDURE — 214N000001 HC R&B CCU UMMC

## 2021-03-06 PROCEDURE — 250N000011 HC RX IP 250 OP 636: Performed by: INTERNAL MEDICINE

## 2021-03-06 PROCEDURE — 250N000013 HC RX MED GY IP 250 OP 250 PS 637: Performed by: INTERNAL MEDICINE

## 2021-03-06 PROCEDURE — 80053 COMPREHEN METABOLIC PANEL: CPT | Performed by: INTERNAL MEDICINE

## 2021-03-06 PROCEDURE — 85520 HEPARIN ASSAY: CPT | Performed by: INTERNAL MEDICINE

## 2021-03-06 PROCEDURE — 99233 SBSQ HOSP IP/OBS HIGH 50: CPT | Mod: GC | Performed by: INTERNAL MEDICINE

## 2021-03-06 PROCEDURE — 250N000013 HC RX MED GY IP 250 OP 250 PS 637

## 2021-03-06 PROCEDURE — 85610 PROTHROMBIN TIME: CPT | Performed by: INTERNAL MEDICINE

## 2021-03-06 PROCEDURE — 93010 ELECTROCARDIOGRAM REPORT: CPT | Mod: 76 | Performed by: INTERNAL MEDICINE

## 2021-03-06 PROCEDURE — 83735 ASSAY OF MAGNESIUM: CPT | Performed by: INTERNAL MEDICINE

## 2021-03-06 PROCEDURE — 250N000011 HC RX IP 250 OP 636: Performed by: STUDENT IN AN ORGANIZED HEALTH CARE EDUCATION/TRAINING PROGRAM

## 2021-03-06 PROCEDURE — 82728 ASSAY OF FERRITIN: CPT | Performed by: INTERNAL MEDICINE

## 2021-03-06 RX ORDER — OXYCODONE HYDROCHLORIDE 5 MG/1
5 TABLET ORAL ONCE
Status: COMPLETED | OUTPATIENT
Start: 2021-03-06 | End: 2021-03-06

## 2021-03-06 RX ORDER — POTASSIUM CHLORIDE 750 MG/1
10 TABLET, EXTENDED RELEASE ORAL ONCE
Status: COMPLETED | OUTPATIENT
Start: 2021-03-06 | End: 2021-03-06

## 2021-03-06 RX ORDER — DOBUTAMINE HYDROCHLORIDE 200 MG/100ML
5 INJECTION INTRAVENOUS CONTINUOUS
Status: DISCONTINUED | OUTPATIENT
Start: 2021-03-06 | End: 2021-03-10 | Stop reason: HOSPADM

## 2021-03-06 RX ORDER — MAGNESIUM SULFATE HEPTAHYDRATE 40 MG/ML
2 INJECTION, SOLUTION INTRAVENOUS ONCE
Status: COMPLETED | OUTPATIENT
Start: 2021-03-06 | End: 2021-03-06

## 2021-03-06 RX ORDER — WARFARIN SODIUM 7.5 MG/1
7.5 TABLET ORAL
Status: COMPLETED | OUTPATIENT
Start: 2021-03-06 | End: 2021-03-06

## 2021-03-06 RX ADMIN — Medication 5 ML: at 18:15

## 2021-03-06 RX ADMIN — OXYCODONE HYDROCHLORIDE 5 MG: 5 TABLET ORAL at 09:14

## 2021-03-06 RX ADMIN — POTASSIUM CHLORIDE 10 MEQ: 750 TABLET, EXTENDED RELEASE ORAL at 18:10

## 2021-03-06 RX ADMIN — PANTOPRAZOLE SODIUM 40 MG: 40 TABLET, DELAYED RELEASE ORAL at 15:56

## 2021-03-06 RX ADMIN — Medication 400 MG: at 20:06

## 2021-03-06 RX ADMIN — ACETAMINOPHEN 650 MG: 325 TABLET, FILM COATED ORAL at 07:24

## 2021-03-06 RX ADMIN — FOLIC ACID 1 MG: 1 TABLET ORAL at 07:26

## 2021-03-06 RX ADMIN — TORSEMIDE 80 MG: 20 TABLET ORAL at 11:42

## 2021-03-06 RX ADMIN — DOBUTAMINE HYDROCHLORIDE 2.5 MCG/KG/MIN: 200 INJECTION INTRAVENOUS at 22:57

## 2021-03-06 RX ADMIN — OXYCODONE HYDROCHLORIDE 5 MG: 5 TABLET ORAL at 15:56

## 2021-03-06 RX ADMIN — LIDOCAINE 1 PATCH: 560 PATCH PERCUTANEOUS; TOPICAL; TRANSDERMAL at 07:26

## 2021-03-06 RX ADMIN — TORSEMIDE 80 MG: 20 TABLET ORAL at 07:27

## 2021-03-06 RX ADMIN — MULTIPLE VITAMINS W/ MINERALS TAB 1 TABLET: TAB at 07:27

## 2021-03-06 RX ADMIN — SPIRONOLACTONE 50 MG: 25 TABLET, FILM COATED ORAL at 07:28

## 2021-03-06 RX ADMIN — MENTHOL 1 PATCH: 205.5 PATCH TOPICAL at 18:15

## 2021-03-06 RX ADMIN — PANTOPRAZOLE SODIUM 40 MG: 40 TABLET, DELAYED RELEASE ORAL at 07:26

## 2021-03-06 RX ADMIN — ACETAMINOPHEN 650 MG: 325 TABLET, FILM COATED ORAL at 12:03

## 2021-03-06 RX ADMIN — ENOXAPARIN SODIUM 50 MG: 60 INJECTION SUBCUTANEOUS at 21:55

## 2021-03-06 RX ADMIN — NICOTINE 7 MG/24 HR DAILY TRANSDERMAL PATCH 1 PATCH: at 07:36

## 2021-03-06 RX ADMIN — WARFARIN SODIUM 7.5 MG: 7.5 TABLET ORAL at 18:10

## 2021-03-06 RX ADMIN — ENOXAPARIN SODIUM 50 MG: 60 INJECTION SUBCUTANEOUS at 08:32

## 2021-03-06 RX ADMIN — MAGNESIUM SULFATE HEPTAHYDRATE 2 G: 40 INJECTION, SOLUTION INTRAVENOUS at 15:56

## 2021-03-06 RX ADMIN — Medication 400 MG: at 07:26

## 2021-03-06 RX ADMIN — POTASSIUM CHLORIDE 10 MEQ: 750 TABLET, EXTENDED RELEASE ORAL at 13:34

## 2021-03-06 RX ADMIN — Medication 25 MG: at 08:01

## 2021-03-06 RX ADMIN — Medication 5 ML: at 05:48

## 2021-03-06 RX ADMIN — TORSEMIDE 80 MG: 20 TABLET ORAL at 18:09

## 2021-03-06 RX ADMIN — THIAMINE HCL TAB 100 MG 100 MG: 100 TAB at 07:26

## 2021-03-06 RX ADMIN — ACETAMINOPHEN 650 MG: 325 TABLET, FILM COATED ORAL at 15:55

## 2021-03-06 RX ADMIN — Medication 3 ML: at 11:31

## 2021-03-06 RX ADMIN — OXYCODONE HYDROCHLORIDE 5 MG: 5 TABLET ORAL at 07:45

## 2021-03-06 ASSESSMENT — ACTIVITIES OF DAILY LIVING (ADL)
ADLS_ACUITY_SCORE: 17
ADLS_ACUITY_SCORE: 16

## 2021-03-06 ASSESSMENT — MIFFLIN-ST. JEOR: SCORE: 1557.21

## 2021-03-06 NOTE — PLAN OF CARE
Time of care [id number]  D: Cardiogenic shock (H)  (primary encounter diagnosis)    I: Monitored vitals and assessed pt status.   Changed:  -Sitter added for impulsivity, confusion, line pulling. Head CT completed and additional BMP and ammonia drawn at 2200 for mental status changes. CT negative and labs largely normal with exception of low sodium.   -Promoted sleep  -Reinforced skin injury prevention, patient declined interventions.   Running:  -PIV saline locked  -Double lumen PICC saline locked  PRN:  -Tylenol  -Melatonin  -Atarax    A: On evenings patient disoriented to situation. Gradually became more impulsive/confused as well as making profane comments to staff. Had episode where he threw his belongings in the trash and then promptly for got the event or why he did it. Finally was able to fall asleep and appeared to sleep well for about 5 hours. Mentation seems clearer this morning. VSS, on room air. Sinus rhythm on monitor. BP soft (90s systolic) over night. Afebrile. Complains of back/neck/buttock pain. Controlled with tylenol and heat packs this shift. BM+ At beginning of the shift, pt was voiding frequently (2100 ml total out this shift). Weight down 8 lbs since last measurement. On 1800 ml FR. Dressings on right foot and coccyx CDI.  Up with stand by assist.     Temp:  [97.9  F (36.6  C)-98.7  F (37.1  C)] 98.7  F (37.1  C)  Pulse:  [] 90  Resp:  [16-19] 19  BP: ()/(51-70) 93/53  SpO2:  [92 %-100 %] 100 %      P: Continue to monitor Pt status and report changes to treatment team.

## 2021-03-06 NOTE — PROGRESS NOTES
Admitted 2/26 from OSH for advanced cardiac therapies. Admitted for cardiogenic shock, AC and hypervolemia. PMH liver disease, marijuana use, tobacco use, VT s/p ICD, EtOH abuse, NICM.     Neuro: A&Ox4. No acute changes noted.   Cardiac: Afebrile, VSS. Soft BP's per baseline.    Respiratory: RA.   GI/: Voiding spontaneously. No BM this shift.   Diet/appetite: Tolerating 2g sodium, 1.8L FR diet. Pt had 2 episodes of emesis and c/o nausea. Dramamine given PRN x1.   Activity: Up with stand-by assist; independent in room.   Pain: C/o sharp stabbing back and chest pain that radiated through body. 12 lead EKG ordered x2. Troponin labs ordered and resulted. Back pain and generalized 10/10 pain, given 2 doses oxycodone 5mg, PRN tylenol x2. This am, pt had intense pain, was wincing, shaking in extremities, had emesis x2. uncontrolled pain unrelieved by medication management originally. Pain was relieved with ambulation, warm shower and oxycodone second dose. Pt now resting comfortably.   Skin: No new deficits noted. Foot and coccyx wound dressings changed today.   Lines: R DL PICC SL.   Drains: none   Replacement: replaced potassium; was 3.6.         Pt has been resting comfortably, will continue to monitor and follow plan of care. Cards 2.

## 2021-03-06 NOTE — PROVIDER NOTIFICATION
Provider notified that pt's behavior is becoming more impulsive. He attempted to remove his IV and has been getting out of bed despite call light instruction and fall risk education. The patient is throwing his call light on the ground intentionally. Order for bedside attendant obtained.

## 2021-03-06 NOTE — PROGRESS NOTES
[facility]    Cardiology Progress Note- Cards 2        Date of Admission:  2/26/2021     Assessment & Plan: HVSL   Miguel Ángel Henson is a 35 year old male with a PMHx of NICM (LVEF 15%) 2/2 to EtOH abuse, subcutaneous ICD with hx of VT and tobacco abuse who presented to OSH 2/17/20 profoundly volume overloaded with acute on chronic HFrEF, cardiogenic shock and AC requiring transfer to [facility] 2/26/2021 for consideration of advanced therapies. His cardiogenic shock has improved and he is hemodynamically stable on an oral regimen. Liver biopsy has shown fibrosis consistent with cardiac cirrhosis which is prohibitive for advanced cardiac therapies. His hospital course has otherwise been complicated by UGIB due to esophagitis.     Today's Plan:  - continue bridging lovenox, and warfarin, till target INR 2-3  - torsemide 80 mg PO TID, goal I/O ~1L/d net negative  - will consider therapeutic paracentesis as needed  - plan to discharge to home with assist once therapeutic INR. Not a candidate for advanced therapy due to cirrhosis, will continue conversation about home inotrope.     ---Neuro---  # Category 2 Substance Abuse  - appreciate neuropsych evaluation  - recommend formal chemical dependency assessment  - recommend establishing care with a local psychiatrist or psychologist      ---Cardiovascular---  # Dilated NICM (LVEF 15%, LVIDd 7.7 cm) 2/2 to EtOH   # Acute on chronic HFrEF, NYHA class IV   # Cardiogenic shock   # Medication/clinic follow-up non compliance   Presented to OSH with marked volume overload and cardiogenic shock requiring multiple pressors. Decompensated HF is in setting of medication non-compliance. On admit to [facility], CI is 2.2 (on dobutamine 3 mcg/kg/min, NE 0.06 and vaso 2.4) with CVP of 8 (small collapsible IVC) after significant volume removal (70 lbs) at OSH. Consideration of advanced therapies limited by biopsy proven cirrhosis   TTE 2/27: LVEF  5-10%, LVIDd 8.97 cm, LV apical thrombus, mod-sev MR, mod-sev TR Mild RV dysfunction,  RHC : RA 6, RV 31/6, PA 31/16 (22), PCWP 14, Luis 5.2/3.2, TD 5.4/3.3, SVR 1060, PVR 1.5 on dobutamine 3, vaso 2.4, norepi 0.02  [place] Numbers : CVP 3, PA 28/17 (24), PCWP 12, SvO2 60, CI 2.05, SVR 1634 on vaso 1, norepi 0.06,  2  [place] numbers  CVP 13, PA 36/24, PCWP 16, SvO2 43, CI/CO 2.1/3.6, SVR 1622  -volume: hypervolemic, paracentesis 3/ 2L, torsemide 80 mg PO TID   -spironolactone 50mg every day  -PEth: negative  -neuropsych evaluation: completed     # LV apical thrombus  - heparin gtt--> lovenox  - warfarin     # Subcutaneous ICD with hx of VT  -ICD check unremarkable     ---Renal---  # Hyponatremia, 2/2 to volume overload; significantly improved from presentation though slowly worsening with volume accumulation  -trend BMP   - diuresis as above     # Diuretic induced hypokalemia  - replacement per unit protocol  - spironolactone as above     ---GI---  # Cardiac Cirrhosis  # Portal hypertension   # Ascites s/p multiple paracentesis   # Hx alcohol abuse with alcoholic pancreatis/hepatitis    Biopsy with severe fibrosis/cardiac cirrhosis. Per hepatology: No way to definitively state his liver will have improvement after heart transplant or the impact of recovery/progression with ongoing alcohol use.  -PEth as above   -trend LFTs  -referral to GI as outpatient for ongoing management     # Upper GIB  Patient with coffee ground emesis. Hb 11 > 8. S/p EGD which showed esophagitis, friable mucosa. No evidence of varices.   - 72hrs ceftriaxone for SBP prophylaxis  - continue pantoprazole 40mg bid oral  - hemoglobin checks BID  - s/p 1u pRBC3/4     # New onset diarrhea  - check stool C Diff toxin/PCR     ---ID---  No acute issues, no growth on cx from paracentesis   -covid negative      ---Endocrine--   No acute issues     ---Hematology---  # Acute Blood Loss Anemia 2/2 UGIB  - transfuse 1u pRBCs  - trend CBC  BID     ---Nutrition--   #  Severe Malnutrition  - nutrition consult  - multivitamin     ---Dermatologic--   # Extremity Wounds  # Pressure wound (present on admission)  - care per WOC recommendations     FEN: 2g Na, 1800cc fluid restricted   DVT Prophylaxis: Heparin gtt-->lovenox  Code Status: Full         Disposition Plan   Expected discharge: 2 - 3 days, recommended to prior living arrangement once therapeutic anticoagulation achieved.      Entered: Sheri Hathaway MD 03/06/2021, 4:08 PM       The patient's care was discussed with the Attending Physician, Dr. Ayala.    Sheri Hathaway MD   PGY-3 Internal Medicine  [facility] [id number]  [facility]  Please see sign in/sign out for up to date coverage information  ______________________________________________________________________    Interval History   He had whole body pain this am. EKG with single lead JODIE at V4. Troponin negative. The pain resolved following an extra dose of oxycodone 5 mg. He had no recurrent pain. Still has severe abdominal distention. No n/v/d. Denies lightheadedness or dizziness when standing up this morning.    Nurse notes reviewed.    Data reviewed today: I reviewed all medications, new labs and imaging results over the last 24 hours.     Physical Exam   Vital Signs: Temp: 97.7  F (36.5  C) Temp src: Oral BP: 112/80 Pulse: 98   Resp: 18 SpO2: 100 % O2 Device: None (Room air) Oxygen Delivery: 1.5 LPM  Weight: 138 lbs 0 oz  Constitutional: awake, alert, no apparent distress, cachectic in appearance  Eyes: Nonicteric, pupils equal  HENT: NCAT, temporal wasting  Respiratory: Nonlabored,  Decreased air movement over the b/l bases, no crackles or wheezes  Cardiovascular: RRR,  III/VI Holosystolic blowing murmur best appreciated over apex, parasternal heave, laterally displaced PMI; JVP ~12 cm  GI: Distended abdomen, nonrigid, NT. + fluid wave  Skin: warm and dry, no rash, feet with  bandages in place  Musculoskeletal: thin extremities, wwp, no peripheral edema  Neurologic: AAOx3, CN grossly intact, spontaneous movement of all extremities    Data   Recent Labs   Lab 03/06/21  1532 03/06/21  1136 03/06/21  0558 03/05/21  2243 03/05/21  1433 03/05/21  0329 03/04/21  0547 03/04/21  0547   WBC 8.3  --  5.4  --  6.5 7.1   < > 6.5   HGB 9.7*  --  8.9*  --  7.7* 8.0*   < > 7.2*   MCV 97  --  98  --  98 99   < > 100     --  282  --  260 284   < > 258   INR  --   --  1.25*  --   --  1.26*  --  1.27*   *  --  127* 125* 123* 123*   < > 124*   POTASSIUM 3.6  --  3.6 4.0 4.5 4.4   < > 4.1   CHLORIDE 93*  --  93* 90* 91* 90*   < > 92*   CO2 28  --  28 27 25 26   < > 23   BUN 13  --  15 16 19 23   < > 25   CR 0.70  --  0.73 0.79 0.82 0.87   < > 0.93   ANIONGAP 7  --  7 7 7 6   < > 9   ALEKSEY 8.4*  --  7.9* 8.0* 7.8* 7.9*   < > 7.8*   *  --  81 87 88 97   < > 95   ALBUMIN  --   --  2.4*  --   --  2.7*  --  2.4*   PROTTOTAL  --   --  5.0*  --   --  5.3*  --  5.2*   BILITOTAL  --   --  1.5*  --   --  2.3*  --  1.3   ALKPHOS  --   --  137  --   --  143  --  161*   ALT  --   --  22  --   --  26  --  27   AST  --   --  29  --   --  32  --  36   TROPI  --  0.039  --   --   --   --   --   --     < > = values in this interval not displayed.     Recent Results (from the past 24 hour(s))   CT Head w/o Contrast    Narrative    CT HEAD W/O CONTRAST 3/5/2021 7:49 PM    History: Neuro deficit, acute, stroke suspected     Comparison: None    Technique: Using multidetector thin collimation helical acquisition  technique, axial, coronal and sagittal CT images from the skull base  to the vertex were obtained without intravenous contrast.    Findings: There is no intracranial hemorrhage, mass effect, or midline  shift. Moderate diffuse cerebral and cerebellar atrophy, greater than  expected for age. No definite acute loss of gray-white matter  differentiation. Ventricles are proportionate to the cerebral  sulci.  The basal cisterns are clear.    The bony calvaria and the bones of the skull base are normal. The  visualized portions of the paranasal sinuses and mastoid air cells are  clear. Orbits are grossly unremarkable.      Impression    Impression:  1. No acute intracranial pathology.   2. Moderate diffuse cerebral and cerebellar atrophy, greater than  expected for age.    I have personally reviewed the examination and initial interpretation  and I agree with the findings.    LUIS A CASTRO MD     Medications     - MEDICATION INSTRUCTIONS -       - MEDICATION INSTRUCTIONS -       Warfarin Therapy Reminder         enoxaparin ANTICOAGULANT  1 mg/kg (Dosing Weight) Subcutaneous Q12H     folic acid  1 mg Oral Daily     lidocaine  1 patch Transdermal Q24H     lidocaine   Transdermal Q8H     magnesium oxide  400 mg Oral BID     magnesium sulfate  2 g Intravenous Once     menthol   Transdermal Q8H     multivitamin w/minerals  1 tablet Oral Daily     nicotine  1 patch Transdermal Daily     nicotine   Transdermal Q8H     pantoprazole  40 mg Oral BID AC     spironolactone  50 mg Oral Daily     vitamin B1  100 mg Oral Daily     torsemide  80 mg Oral TID     warfarin ANTICOAGULANT  7.5 mg Oral ONCE at 18:00

## 2021-03-07 ENCOUNTER — APPOINTMENT (OUTPATIENT)
Dept: EDUCATION SERVICES | Facility: CLINIC | Age: 36
End: 2021-03-07
Payer: MEDICAID

## 2021-03-07 LAB
ALBUMIN SERPL-MCNC: 2.6 G/DL (ref 3.4–5)
ALP SERPL-CCNC: 155 U/L (ref 40–150)
ALT SERPL W P-5'-P-CCNC: 23 U/L (ref 0–70)
ANION GAP SERPL CALCULATED.3IONS-SCNC: 7 MMOL/L (ref 3–14)
ANION GAP SERPL CALCULATED.3IONS-SCNC: 7 MMOL/L (ref 3–14)
AST SERPL W P-5'-P-CCNC: 34 U/L (ref 0–45)
BASE EXCESS BLDV CALC-SCNC: 7.1 MMOL/L
BILIRUB SERPL-MCNC: 1.3 MG/DL (ref 0.2–1.3)
BUN SERPL-MCNC: 10 MG/DL (ref 7–30)
BUN SERPL-MCNC: 10 MG/DL (ref 7–30)
CALCIUM SERPL-MCNC: 8 MG/DL (ref 8.5–10.1)
CALCIUM SERPL-MCNC: 8.3 MG/DL (ref 8.5–10.1)
CHLORIDE SERPL-SCNC: 89 MMOL/L (ref 94–109)
CHLORIDE SERPL-SCNC: 90 MMOL/L (ref 94–109)
CO2 SERPL-SCNC: 30 MMOL/L (ref 20–32)
CO2 SERPL-SCNC: 31 MMOL/L (ref 20–32)
CREAT SERPL-MCNC: 0.67 MG/DL (ref 0.66–1.25)
CREAT SERPL-MCNC: 0.75 MG/DL (ref 0.66–1.25)
ERYTHROCYTE [DISTWIDTH] IN BLOOD BY AUTOMATED COUNT: 16.4 % (ref 10–15)
GFR SERPL CREATININE-BSD FRML MDRD: >90 ML/MIN/{1.73_M2}
GFR SERPL CREATININE-BSD FRML MDRD: >90 ML/MIN/{1.73_M2}
GLUCOSE SERPL-MCNC: 103 MG/DL (ref 70–99)
GLUCOSE SERPL-MCNC: 99 MG/DL (ref 70–99)
HCO3 BLDV-SCNC: 32 MMOL/L (ref 21–28)
HCT VFR BLD AUTO: 28.9 % (ref 40–53)
HGB BLD-MCNC: 9.5 G/DL (ref 13.3–17.7)
INR PPP: 1.44 (ref 0.86–1.14)
INTERPRETATION ECG - MUSE: NORMAL
INTERPRETATION ECG - MUSE: NORMAL
LACTATE BLD-SCNC: 0.5 MMOL/L (ref 0.7–2)
MAGNESIUM SERPL-MCNC: 1.7 MG/DL (ref 1.6–2.3)
MCH RBC QN AUTO: 31.9 PG (ref 26.5–33)
MCHC RBC AUTO-ENTMCNC: 32.9 G/DL (ref 31.5–36.5)
MCV RBC AUTO: 97 FL (ref 78–100)
O2/TOTAL GAS SETTING VFR VENT: 21 %
OXYHGB MFR BLDV: 61 %
PCO2 BLDV: 48 MM HG (ref 40–50)
PH BLDV: 7.44 PH (ref 7.32–7.43)
PLATELET # BLD AUTO: 322 10E9/L (ref 150–450)
PO2 BLDV: 35 MM HG (ref 25–47)
POTASSIUM SERPL-SCNC: 3.6 MMOL/L (ref 3.4–5.3)
POTASSIUM SERPL-SCNC: 4.1 MMOL/L (ref 3.4–5.3)
PROT SERPL-MCNC: 5.6 G/DL (ref 6.8–8.8)
RBC # BLD AUTO: 2.98 10E12/L (ref 4.4–5.9)
SODIUM SERPL-SCNC: 126 MMOL/L (ref 133–144)
SODIUM SERPL-SCNC: 128 MMOL/L (ref 133–144)
WBC # BLD AUTO: 6.1 10E9/L (ref 4–11)

## 2021-03-07 PROCEDURE — 250N000011 HC RX IP 250 OP 636

## 2021-03-07 PROCEDURE — 250N000011 HC RX IP 250 OP 636: Performed by: STUDENT IN AN ORGANIZED HEALTH CARE EDUCATION/TRAINING PROGRAM

## 2021-03-07 PROCEDURE — 83605 ASSAY OF LACTIC ACID: CPT | Performed by: INTERNAL MEDICINE

## 2021-03-07 PROCEDURE — 214N000001 HC R&B CCU UMMC

## 2021-03-07 PROCEDURE — 36415 COLL VENOUS BLD VENIPUNCTURE: CPT | Performed by: INTERNAL MEDICINE

## 2021-03-07 PROCEDURE — 250N000013 HC RX MED GY IP 250 OP 250 PS 637: Performed by: STUDENT IN AN ORGANIZED HEALTH CARE EDUCATION/TRAINING PROGRAM

## 2021-03-07 PROCEDURE — 250N000013 HC RX MED GY IP 250 OP 250 PS 637

## 2021-03-07 PROCEDURE — 83735 ASSAY OF MAGNESIUM: CPT | Performed by: INTERNAL MEDICINE

## 2021-03-07 PROCEDURE — 80048 BASIC METABOLIC PNL TOTAL CA: CPT | Performed by: INTERNAL MEDICINE

## 2021-03-07 PROCEDURE — 999N000044 HC STATISTIC CVC DRESSING CHANGE

## 2021-03-07 PROCEDURE — 82805 BLOOD GASES W/O2 SATURATION: CPT | Performed by: INTERNAL MEDICINE

## 2021-03-07 PROCEDURE — 85027 COMPLETE CBC AUTOMATED: CPT | Performed by: INTERNAL MEDICINE

## 2021-03-07 PROCEDURE — 99233 SBSQ HOSP IP/OBS HIGH 50: CPT | Mod: GC | Performed by: INTERNAL MEDICINE

## 2021-03-07 PROCEDURE — 250N000013 HC RX MED GY IP 250 OP 250 PS 637: Performed by: INTERNAL MEDICINE

## 2021-03-07 PROCEDURE — 80053 COMPREHEN METABOLIC PANEL: CPT | Performed by: INTERNAL MEDICINE

## 2021-03-07 PROCEDURE — 85610 PROTHROMBIN TIME: CPT | Performed by: INTERNAL MEDICINE

## 2021-03-07 RX ORDER — METOLAZONE 2.5 MG/1
2.5 TABLET ORAL ONCE
Status: COMPLETED | OUTPATIENT
Start: 2021-03-07 | End: 2021-03-07

## 2021-03-07 RX ORDER — HEPARIN SODIUM,PORCINE 10 UNIT/ML
2-5 VIAL (ML) INTRAVENOUS
Status: DISCONTINUED | OUTPATIENT
Start: 2021-03-07 | End: 2021-03-10 | Stop reason: HOSPADM

## 2021-03-07 RX ORDER — LIDOCAINE 40 MG/G
CREAM TOPICAL
Status: DISCONTINUED | OUTPATIENT
Start: 2021-03-07 | End: 2021-03-10 | Stop reason: HOSPADM

## 2021-03-07 RX ORDER — OLANZAPINE 5 MG/1
5 TABLET, ORALLY DISINTEGRATING ORAL ONCE
Status: DISCONTINUED | OUTPATIENT
Start: 2021-03-07 | End: 2021-03-07

## 2021-03-07 RX ORDER — HALOPERIDOL 5 MG/ML
2 INJECTION INTRAMUSCULAR EVERY 6 HOURS PRN
Status: DISCONTINUED | OUTPATIENT
Start: 2021-03-07 | End: 2021-03-07

## 2021-03-07 RX ORDER — OLANZAPINE 10 MG/2ML
5 INJECTION, POWDER, FOR SOLUTION INTRAMUSCULAR ONCE
Status: COMPLETED | OUTPATIENT
Start: 2021-03-07 | End: 2021-03-07

## 2021-03-07 RX ORDER — POTASSIUM CHLORIDE 750 MG/1
20 TABLET, EXTENDED RELEASE ORAL
Status: DISCONTINUED | OUTPATIENT
Start: 2021-03-07 | End: 2021-03-09 | Stop reason: ALTCHOICE

## 2021-03-07 RX ORDER — WARFARIN SODIUM 7.5 MG/1
7.5 TABLET ORAL
Status: COMPLETED | OUTPATIENT
Start: 2021-03-07 | End: 2021-03-07

## 2021-03-07 RX ORDER — POTASSIUM CHLORIDE 750 MG/1
10 TABLET, EXTENDED RELEASE ORAL ONCE
Status: COMPLETED | OUTPATIENT
Start: 2021-03-07 | End: 2021-03-07

## 2021-03-07 RX ADMIN — OLANZAPINE 5 MG: 10 INJECTION, POWDER, LYOPHILIZED, FOR SOLUTION INTRAMUSCULAR at 14:55

## 2021-03-07 RX ADMIN — ACETAMINOPHEN 650 MG: 325 TABLET, FILM COATED ORAL at 20:17

## 2021-03-07 RX ADMIN — METOLAZONE 2.5 MG: 2.5 TABLET ORAL at 13:26

## 2021-03-07 RX ADMIN — ACETAMINOPHEN 650 MG: 325 TABLET, FILM COATED ORAL at 13:26

## 2021-03-07 RX ADMIN — TORSEMIDE 80 MG: 20 TABLET ORAL at 08:00

## 2021-03-07 RX ADMIN — DOBUTAMINE HYDROCHLORIDE 2.5 MCG/KG/MIN: 200 INJECTION INTRAVENOUS at 05:19

## 2021-03-07 RX ADMIN — LIDOCAINE 1 PATCH: 560 PATCH PERCUTANEOUS; TOPICAL; TRANSDERMAL at 08:01

## 2021-03-07 RX ADMIN — HYDROXYZINE HYDROCHLORIDE 25 MG: 25 TABLET, FILM COATED ORAL at 19:51

## 2021-03-07 RX ADMIN — RIFAXIMIN 550 MG: 550 TABLET ORAL at 19:51

## 2021-03-07 RX ADMIN — TORSEMIDE 80 MG: 20 TABLET ORAL at 17:53

## 2021-03-07 RX ADMIN — Medication 400 MG: at 08:00

## 2021-03-07 RX ADMIN — SPIRONOLACTONE 50 MG: 25 TABLET, FILM COATED ORAL at 08:00

## 2021-03-07 RX ADMIN — PANTOPRAZOLE SODIUM 40 MG: 40 TABLET, DELAYED RELEASE ORAL at 17:52

## 2021-03-07 RX ADMIN — NICOTINE 7 MG/24 HR DAILY TRANSDERMAL PATCH 1 PATCH: at 08:00

## 2021-03-07 RX ADMIN — WARFARIN SODIUM 7.5 MG: 7.5 TABLET ORAL at 17:52

## 2021-03-07 RX ADMIN — MENTHOL 1 PATCH: 205.5 PATCH TOPICAL at 21:44

## 2021-03-07 RX ADMIN — MULTIPLE VITAMINS W/ MINERALS TAB 1 TABLET: TAB at 08:00

## 2021-03-07 RX ADMIN — POTASSIUM CHLORIDE 20 MEQ: 750 TABLET, EXTENDED RELEASE ORAL at 17:52

## 2021-03-07 RX ADMIN — ENOXAPARIN SODIUM 50 MG: 60 INJECTION SUBCUTANEOUS at 08:01

## 2021-03-07 RX ADMIN — POTASSIUM CHLORIDE 10 MEQ: 750 TABLET, EXTENDED RELEASE ORAL at 08:00

## 2021-03-07 RX ADMIN — TORSEMIDE 80 MG: 20 TABLET ORAL at 12:37

## 2021-03-07 RX ADMIN — DOBUTAMINE HYDROCHLORIDE 2.5 MCG/KG/MIN: 200 INJECTION INTRAVENOUS at 17:25

## 2021-03-07 RX ADMIN — LACTULOSE 20 G: 20 POWDER, FOR SOLUTION ORAL at 19:51

## 2021-03-07 RX ADMIN — PANTOPRAZOLE SODIUM 40 MG: 40 TABLET, DELAYED RELEASE ORAL at 08:00

## 2021-03-07 RX ADMIN — MELATONIN TAB 3 MG 3 MG: 3 TAB at 00:58

## 2021-03-07 RX ADMIN — POTASSIUM CHLORIDE 20 MEQ: 750 TABLET, EXTENDED RELEASE ORAL at 13:26

## 2021-03-07 RX ADMIN — HYDROXYZINE HYDROCHLORIDE 25 MG: 25 TABLET, FILM COATED ORAL at 00:58

## 2021-03-07 RX ADMIN — FOLIC ACID 1 MG: 1 TABLET ORAL at 08:00

## 2021-03-07 RX ADMIN — THIAMINE HCL TAB 100 MG 100 MG: 100 TAB at 08:00

## 2021-03-07 RX ADMIN — OXYCODONE HYDROCHLORIDE 5 MG: 5 TABLET ORAL at 00:58

## 2021-03-07 ASSESSMENT — MIFFLIN-ST. JEOR: SCORE: 1511.4

## 2021-03-07 ASSESSMENT — ACTIVITIES OF DAILY LIVING (ADL)
ADLS_ACUITY_SCORE: 17

## 2021-03-07 NOTE — PROGRESS NOTES
[facility]    Cardiology Progress Note- Cards 2        Date of Admission:  2/26/2021     Assessment & Plan: HVSL   Miguel Ángel Henson is a 35 year old male with a PMHx of NICM (LVEF 15%) 2/2 to EtOH abuse, subcutaneous ICD with hx of VT and tobacco abuse who presented to OSH 2/17/20 profoundly volume overloaded with acute on chronic HFrEF, cardiogenic shock and CA requiring transfer to [facility] 2/26/2021 for consideration of advanced therapies. His cardiogenic shock has improved and he is hemodynamically stable on an oral regimen. Liver biopsy has shown fibrosis consistent with cardiac cirrhosis which is prohibitive for advanced cardiac therapies. His hospital course has otherwise been complicated by UGIB due to esophagitis.     Today's Plan:  - start dobutamine @2.5  - continue bridging lovenox, and warfarin, till target INR 2-3  - torsemide 80 mg PO TID, metolazone 2.5 mg PO once today, goal I/O ~1L/d net negative  - scheduled potassium  - will plan for therapeutic paracentesis 3/8, procedure team consult placed. Hold lovenox tonight x 24 hours for planned paracentesis 3/8/21  - plan to discharge to home with assist once therapeutic INR and stable clinically. Not a candidate for advanced therapy due to cirrhosis, will continue conversation about home inotrope.     ---Neuro---  # Category 2 Substance Abuse  - appreciate neuropsych evaluation  - recommend formal chemical dependency assessment  - recommend establishing care with a local psychiatrist or psychologist      ---Cardiovascular---  # Dilated NICM (LVEF 15%, LVIDd 7.7 cm) 2/2 to EtOH   # Acute on chronic HFrEF, NYHA class IV   # Cardiogenic shock   # Medication/clinic follow-up non compliance   Presented to OSH with marked volume overload and cardiogenic shock requiring multiple pressors. Decompensated HF is in setting of medication non-compliance. On admit to [facility], CI is 2.2 (on dobutamine 3 mcg/kg/min,  NE 0.06 and vaso 2.4) with CVP of 8 (small collapsible IVC) after significant volume removal (70 lbs) at OSH. Consideration of advanced therapies limited by biopsy proven cirrhosis   TTE : LVEF 5-10%, LVIDd 8.97 cm, LV apical thrombus, mod-sev MR, mod-sev TR Mild RV dysfunction,  RHC : RA 6, RV 31/6, PA 31/16 (22), PCWP 14, Luis 5.2/3.2, TD 5.4/3.3, SVR 1060, PVR 1.5 on dobutamine 3, vaso 2.4, norepi 0.02  [place] Numbers : CVP 3, PA 28/17 (24), PCWP 12, SvO2 60, CI 2.05, SVR 1634 on vaso 1, norepi 0.06,  2  [place] numbers  CVP 13, PA 36/24, PCWP 16, SvO2 43, CI/CO 2.1/3.6, SVR 1622  -volume: hypervolemic, paracentesis 3/4 2L, torsemide 80 mg PO TID   -spironolactone 50mg every day  -PEth: negative  -neuropsych evaluation: completed     # LV apical thrombus  - heparin gtt--> lovenox  - warfarin     # Subcutaneous ICD with hx of VT  -ICD check unremarkable     ---Renal---  # Hyponatremia, 2/2 to volume overload; significantly improved from presentation though slowly worsening with volume accumulation  -trend BMP   - diuresis as above     # Diuretic induced hypokalemia  - replacement per unit protocol  - spironolactone as above     ---GI---  # Cardiac Cirrhosis  # Portal hypertension   # Ascites s/p multiple paracentesis   # Hx alcohol abuse with alcoholic pancreatis/hepatitis    Biopsy with severe fibrosis/cardiac cirrhosis. Per hepatology: No way to definitively state his liver will have improvement after heart transplant or the impact of recovery/progression with ongoing alcohol use.  -PEth as above   -trend LFTs  -referral to GI as outpatient for ongoing management    -will plan for therapeutic paracentesis 3/8, procedure team consult placed. Hold lovenox tonight x 24 hours for planned paracentesis 3/8/21     # Upper GIB  Patient with coffee ground emesis. Hb 11 > 8. S/p EGD which showed esophagitis, friable mucosa. No evidence of varices.   - 72hrs ceftriaxone for SBP prophylaxis  - continue  pantoprazole 40mg bid oral  - hemoglobin checks BID  - s/p 1u pRBC3/4     # New onset diarrhea  - check stool C Diff toxin/PCR     ---ID---  No acute issues, no growth on cx from paracentesis   -covid negative      ---Endocrine--   No acute issues     ---Hematology---  # Acute Blood Loss Anemia 2/2 UGIB  - transfuse 1u pRBCs  - trend CBC BID     ---Nutrition--   #  Severe Malnutrition  - nutrition consult  - multivitamin     ---Dermatologic--   # Extremity Wounds  # Pressure wound (present on admission)  - care per WOC recommendations     FEN: 2g Na, 1800cc fluid restricted   DVT Prophylaxis: Heparin gtt-->lovenox  Code Status: Full         Disposition Plan   Expected discharge: 2 - 3 days, recommended to prior living arrangement once therapeutic anticoagulation achieved.      Entered: Sheri Hathaway MD 03/07/2021, 8:13 AM       The patient's care was discussed with the Attending Physician, Dr. Ayala.    Sheri Hathaway MD   PGY-3 Internal Medicine  [facility] [id number]  [employer]  Please see sign in/sign out for up to date coverage information  ______________________________________________________________________    Interval History   NAEO. No pain, SOB, dizziness.  Nurse notes reviewed.  I/O net negative 1.5L    Data reviewed today: I reviewed all medications, new labs and imaging results over the last 24 hours.     Physical Exam   Vital Signs: Temp: 98.1  F (36.7  C) Temp src: Oral BP: 92/54 Pulse: 108   Resp: 16 SpO2: 98 % O2 Device: None (Room air) Oxygen Delivery: 1.5 LPM  Weight: 138 lbs 0 oz  Constitutional: awake, alert, no apparent distress, cachectic in appearance  Eyes: Nonicteric, pupils equal  HENT: NCAT, temporal wasting  Respiratory: Nonlabored,  Decreased air movement over the b/l bases, no crackles or wheezes  Cardiovascular: RRR,  III/VI Holosystolic blowing murmur best appreciated over apex, parasternal heave, laterally displaced PMI; JVP  12 cm  GI: Distended abdomen, nonrigid, NT. + fluid wave  Skin: warm and dry, no rash, feet with bandages in place  Musculoskeletal: thin extremities, wwp, no peripheral edema  Neurologic: AAOx3, CN grossly intact, spontaneous movement of all extremities    Data   Recent Labs   Lab 03/07/21  0516 03/06/21  1532 03/06/21  1136 03/06/21  0558 03/05/21  0329 03/05/21  0329   WBC 6.1 8.3  --  5.4   < > 7.1   HGB 9.5* 9.7*  --  8.9*   < > 8.0*   MCV 97 97  --  98   < > 99    350  --  282   < > 284   INR 1.44*  --   --  1.25*  --  1.26*   * 128*  --  127*   < > 123*   POTASSIUM 3.6 3.6  --  3.6   < > 4.4   CHLORIDE 89* 93*  --  93*   < > 90*   CO2 30 28  --  28   < > 26   BUN 10 13  --  15   < > 23   CR 0.67 0.70  --  0.73   < > 0.87   ANIONGAP 7 7  --  7   < > 6   ALEKSEY 8.0* 8.4*  --  7.9*   < > 7.9*   GLC 99 115*  --  81   < > 97   ALBUMIN 2.6*  --   --  2.4*  --  2.7*   PROTTOTAL 5.6*  --   --  5.0*  --  5.3*   BILITOTAL 1.3  --   --  1.5*  --  2.3*   ALKPHOS 155*  --   --  137  --  143   ALT 23  --   --  22  --  26   AST 34  --   --  29  --  32   TROPI  --   --  0.039  --   --   --     < > = values in this interval not displayed.     No results found for this or any previous visit (from the past 24 hour(s)).  Medications     - MEDICATION INSTRUCTIONS -       DOBUTamine 2.5 mcg/kg/min (03/07/21 0519)     - MEDICATION INSTRUCTIONS -       Warfarin Therapy Reminder         enoxaparin ANTICOAGULANT  1 mg/kg (Dosing Weight) Subcutaneous Q12H     folic acid  1 mg Oral Daily     lidocaine  1 patch Transdermal Q24H     lidocaine   Transdermal Q8H     menthol   Transdermal Q8H     multivitamin w/minerals  1 tablet Oral Daily     nicotine  1 patch Transdermal Daily     nicotine   Transdermal Q8H     pantoprazole  40 mg Oral BID AC     spironolactone  50 mg Oral Daily     vitamin B1  100 mg Oral Daily     torsemide  80 mg Oral TID

## 2021-03-07 NOTE — PLAN OF CARE
Pt admitted 2/26 from OSH for advanced heart failure workup. Liver biopsy has shown fibrosis consistent w/ cardiac cirrhosis which is prohibitive for advanced cardiac therapies. Hospital course c/b UGIB d/t esophagitis. PMH includes NICM (LVEF 15%) 2/2 due to EtOH abuse, VT s/p ICD, tobacco abuse.     Neuro: A&O x4 at start of shift with increased confusion throughout the night. Inconsistent with ability to answer questions and follow commands appropriately. Pt endorsing visual abnormalities and says he can see objects in front of him other places in the room where they are not at. Confused at times about situation, worse throughout the night. Bed alarm went off and pt was found standing in room peeing on the floor. Took pt on walk and he went to fire exit and insisted on leaving. While sitting in hallway pt was holding IV tubing and pulled tubing apart. After return to room pt has been sleeping since 0500. Bed alarm set.  Cardiac: SR/ST 90's-110's. SBP 90's-110's. Denies dizziness, chest pain, or palpitations.   Respiratory: Sating well on RA. No MCCORMICK. LS clear.   GI/: Voiding frequently into bedside urinal. One episode of incontinence in the bed and pt stood and peed in floor. Last BM 3/5. Denies nausea.  Diet/Appetite: 2G Na diet. 1.8L FR.   Skin: Dressing on coccyx and right foot CDI. To be changed per plan of care.   LDA: R DL PICC with dobutamine running at 2.5 mcg/kg/min (3.9 mL/hr). Red lumen of PICC occluded. Unable to flush. Previously TPA'ed 3/3 without success.  Activity: SBA  Pain: Generalized back pain controlled by PRN oxycodone 1x.   Labs: Na 126. K 3.6. Replacement ordered per protocol.      Plan: Continue to monitor and alert team with any changes or concerns.

## 2021-03-07 NOTE — PLAN OF CARE
Admitted 2/26 from OSH for advanced cardiac therapies. Admitted for cardiogenic shock, AC and hypervolemia. PMH liver disease, marijuana use, tobacco use, VT s/p ICD, EtOH abuse, NICM. Starting on a dobutamine drip at 2.5 mcgs/kg/min. VSS, SR/ST 65-110s. Pt was oriented but slow to figure out location and president. Then he walked into the shower with his clothes and tele on. All dressings changed, MD alerted. Bed alarm on. 2nd oxyhemoglobin sent. O2 2lnc put on.

## 2021-03-07 NOTE — CONSULTS
Met at bedside for PLC Coumadin class. Pt sleeping and has been intermittently confused but I spoke with significant other Tamie and she wanted to keep the class today instead of rescheduling. Tamie took notes and answered all teachback questions. Reviewed entire Coumadin pamphlet, questionaire and log book. Tamie appreciated the class.    Literature given: Guide to Warfarin Therapy, Warfarin Therapy Calendar, Guide to the Newer Oral Anticoagulants: Medicines to Treat and Prevent Blood Clots

## 2021-03-08 ENCOUNTER — APPOINTMENT (OUTPATIENT)
Dept: GENERAL RADIOLOGY | Facility: CLINIC | Age: 36
End: 2021-03-08
Attending: STUDENT IN AN ORGANIZED HEALTH CARE EDUCATION/TRAINING PROGRAM
Payer: MEDICAID

## 2021-03-08 ENCOUNTER — APPOINTMENT (OUTPATIENT)
Dept: OCCUPATIONAL THERAPY | Facility: CLINIC | Age: 36
End: 2021-03-08
Attending: INTERNAL MEDICINE
Payer: MEDICAID

## 2021-03-08 LAB
ALBUMIN SERPL-MCNC: 2.5 G/DL (ref 3.4–5)
ALP SERPL-CCNC: 170 U/L (ref 40–150)
ALT SERPL W P-5'-P-CCNC: 25 U/L (ref 0–70)
ANION GAP SERPL CALCULATED.3IONS-SCNC: 11 MMOL/L (ref 3–14)
ANION GAP SERPL CALCULATED.3IONS-SCNC: 8 MMOL/L (ref 3–14)
APPEARANCE FLD: CLEAR
AST SERPL W P-5'-P-CCNC: 31 U/L (ref 0–45)
BASE EXCESS BLDV CALC-SCNC: 7.9 MMOL/L
BASOPHILS NFR FLD MANUAL: 1 %
BILIRUB SERPL-MCNC: 1 MG/DL (ref 0.2–1.3)
BUN SERPL-MCNC: 14 MG/DL (ref 7–30)
BUN SERPL-MCNC: 16 MG/DL (ref 7–30)
CALCIUM SERPL-MCNC: 8.5 MG/DL (ref 8.5–10.1)
CALCIUM SERPL-MCNC: 8.9 MG/DL (ref 8.5–10.1)
CHLORIDE SERPL-SCNC: 86 MMOL/L (ref 94–109)
CHLORIDE SERPL-SCNC: 86 MMOL/L (ref 94–109)
CO2 SERPL-SCNC: 28 MMOL/L (ref 20–32)
CO2 SERPL-SCNC: 31 MMOL/L (ref 20–32)
COLOR FLD: YELLOW
CREAT SERPL-MCNC: 0.8 MG/DL (ref 0.66–1.25)
CREAT SERPL-MCNC: 0.86 MG/DL (ref 0.66–1.25)
GFR SERPL CREATININE-BSD FRML MDRD: >90 ML/MIN/{1.73_M2}
GFR SERPL CREATININE-BSD FRML MDRD: >90 ML/MIN/{1.73_M2}
GLUCOSE SERPL-MCNC: 123 MG/DL (ref 70–99)
GLUCOSE SERPL-MCNC: 90 MG/DL (ref 70–99)
HCO3 BLDV-SCNC: 33 MMOL/L (ref 21–28)
INR PPP: 1.61 (ref 0.86–1.14)
LYMPHOCYTES NFR FLD MANUAL: 27 %
MAGNESIUM SERPL-MCNC: 1.8 MG/DL (ref 1.6–2.3)
NEUTS BAND NFR FLD MANUAL: 28 %
O2/TOTAL GAS SETTING VFR VENT: 21 %
OTHER CELLS FLD MANUAL: 44 %
OXYHGB MFR BLDV: 61 %
PCO2 BLDV: 48 MM HG (ref 40–50)
PH BLDV: 7.44 PH (ref 7.32–7.43)
PO2 BLDV: 35 MM HG (ref 25–47)
POTASSIUM SERPL-SCNC: 3.6 MMOL/L (ref 3.4–5.3)
POTASSIUM SERPL-SCNC: 3.6 MMOL/L (ref 3.4–5.3)
PROT SERPL-MCNC: 6 G/DL (ref 6.8–8.8)
SODIUM SERPL-SCNC: 125 MMOL/L (ref 133–144)
SODIUM SERPL-SCNC: 125 MMOL/L (ref 133–144)
SPECIMEN SOURCE FLD: NORMAL
WBC # FLD AUTO: 109 /UL

## 2021-03-08 PROCEDURE — 250N000013 HC RX MED GY IP 250 OP 250 PS 637: Performed by: STUDENT IN AN ORGANIZED HEALTH CARE EDUCATION/TRAINING PROGRAM

## 2021-03-08 PROCEDURE — 250N000013 HC RX MED GY IP 250 OP 250 PS 637: Performed by: HOSPITALIST

## 2021-03-08 PROCEDURE — 250N000013 HC RX MED GY IP 250 OP 250 PS 637

## 2021-03-08 PROCEDURE — 89051 BODY FLUID CELL COUNT: CPT | Performed by: PEDIATRICS

## 2021-03-08 PROCEDURE — 83735 ASSAY OF MAGNESIUM: CPT | Performed by: INTERNAL MEDICINE

## 2021-03-08 PROCEDURE — 87070 CULTURE OTHR SPECIMN AEROBIC: CPT | Performed by: PEDIATRICS

## 2021-03-08 PROCEDURE — 80048 BASIC METABOLIC PNL TOTAL CA: CPT | Performed by: INTERNAL MEDICINE

## 2021-03-08 PROCEDURE — 99233 SBSQ HOSP IP/OBS HIGH 50: CPT | Mod: GC | Performed by: INTERNAL MEDICINE

## 2021-03-08 PROCEDURE — 97535 SELF CARE MNGMENT TRAINING: CPT | Mod: GO

## 2021-03-08 PROCEDURE — 250N000013 HC RX MED GY IP 250 OP 250 PS 637: Performed by: INTERNAL MEDICINE

## 2021-03-08 PROCEDURE — 82805 BLOOD GASES W/O2 SATURATION: CPT | Performed by: INTERNAL MEDICINE

## 2021-03-08 PROCEDURE — 272N000201 ZZ HC ADHESIVE SKIN CLOSURE, DERMABOND

## 2021-03-08 PROCEDURE — 250N000011 HC RX IP 250 OP 636: Performed by: STUDENT IN AN ORGANIZED HEALTH CARE EDUCATION/TRAINING PROGRAM

## 2021-03-08 PROCEDURE — 272N000456 ZZ HC KIT, 4 FR SL BIOFLO OPEN ENDED PICC

## 2021-03-08 PROCEDURE — 250N000009 HC RX 250: Performed by: STUDENT IN AN ORGANIZED HEALTH CARE EDUCATION/TRAINING PROGRAM

## 2021-03-08 PROCEDURE — 71045 X-RAY EXAM CHEST 1 VIEW: CPT | Mod: 26 | Performed by: STUDENT IN AN ORGANIZED HEALTH CARE EDUCATION/TRAINING PROGRAM

## 2021-03-08 PROCEDURE — 36569 INSJ PICC 5 YR+ W/O IMAGING: CPT

## 2021-03-08 PROCEDURE — 49083 ABD PARACENTESIS W/IMAGING: CPT | Performed by: PEDIATRICS

## 2021-03-08 PROCEDURE — 97110 THERAPEUTIC EXERCISES: CPT | Mod: GO

## 2021-03-08 PROCEDURE — 36415 COLL VENOUS BLD VENIPUNCTURE: CPT | Performed by: INTERNAL MEDICINE

## 2021-03-08 PROCEDURE — 80053 COMPREHEN METABOLIC PANEL: CPT | Performed by: INTERNAL MEDICINE

## 2021-03-08 PROCEDURE — 272N000473 HC KIT, VPS RHYTHM STYLET

## 2021-03-08 PROCEDURE — 85610 PROTHROMBIN TIME: CPT | Performed by: INTERNAL MEDICINE

## 2021-03-08 PROCEDURE — 999N000065 XR CHEST PORT 1 VW

## 2021-03-08 PROCEDURE — P9047 ALBUMIN (HUMAN), 25%, 50ML: HCPCS | Performed by: STUDENT IN AN ORGANIZED HEALTH CARE EDUCATION/TRAINING PROGRAM

## 2021-03-08 PROCEDURE — 214N000001 HC R&B CCU UMMC

## 2021-03-08 RX ORDER — MAGNESIUM OXIDE 400 MG/1
400 TABLET ORAL 2 TIMES DAILY
Status: COMPLETED | OUTPATIENT
Start: 2021-03-08 | End: 2021-03-09

## 2021-03-08 RX ORDER — ALBUMIN (HUMAN) 12.5 G/50ML
30 SOLUTION INTRAVENOUS ONCE
Status: DISCONTINUED | OUTPATIENT
Start: 2021-03-08 | End: 2021-03-08

## 2021-03-08 RX ORDER — ALBUMIN (HUMAN) 12.5 G/50ML
50 SOLUTION INTRAVENOUS ONCE
Status: COMPLETED | OUTPATIENT
Start: 2021-03-08 | End: 2021-03-08

## 2021-03-08 RX ORDER — WARFARIN SODIUM 7.5 MG/1
7.5 TABLET ORAL
Status: COMPLETED | OUTPATIENT
Start: 2021-03-08 | End: 2021-03-08

## 2021-03-08 RX ORDER — ALBUMIN (HUMAN) 12.5 G/50ML
50 SOLUTION INTRAVENOUS ONCE
Status: DISCONTINUED | OUTPATIENT
Start: 2021-03-08 | End: 2021-03-08

## 2021-03-08 RX ORDER — HEPARIN SODIUM,PORCINE 10 UNIT/ML
5-10 VIAL (ML) INTRAVENOUS EVERY 24 HOURS
Status: DISCONTINUED | OUTPATIENT
Start: 2021-03-08 | End: 2021-03-10 | Stop reason: HOSPADM

## 2021-03-08 RX ORDER — MAGNESIUM OXIDE 400 MG/1
400 TABLET ORAL 2 TIMES DAILY
Status: DISCONTINUED | OUTPATIENT
Start: 2021-03-08 | End: 2021-03-08

## 2021-03-08 RX ORDER — POTASSIUM CHLORIDE 750 MG/1
10 TABLET, EXTENDED RELEASE ORAL ONCE
Status: DISCONTINUED | OUTPATIENT
Start: 2021-03-08 | End: 2021-03-08

## 2021-03-08 RX ORDER — HEPARIN SODIUM,PORCINE 10 UNIT/ML
5-10 VIAL (ML) INTRAVENOUS
Status: DISCONTINUED | OUTPATIENT
Start: 2021-03-08 | End: 2021-03-10 | Stop reason: HOSPADM

## 2021-03-08 RX ORDER — POTASSIUM CHLORIDE 750 MG/1
10 TABLET, EXTENDED RELEASE ORAL ONCE
Status: COMPLETED | OUTPATIENT
Start: 2021-03-08 | End: 2021-03-08

## 2021-03-08 RX ADMIN — HYDROXYZINE HYDROCHLORIDE 25 MG: 25 TABLET, FILM COATED ORAL at 03:48

## 2021-03-08 RX ADMIN — HYDROXYZINE HYDROCHLORIDE 25 MG: 25 TABLET, FILM COATED ORAL at 16:24

## 2021-03-08 RX ADMIN — Medication 400 MG: at 08:21

## 2021-03-08 RX ADMIN — Medication 5 ML: at 12:52

## 2021-03-08 RX ADMIN — LACTULOSE 20 G: 20 POWDER, FOR SOLUTION ORAL at 08:21

## 2021-03-08 RX ADMIN — TORSEMIDE 80 MG: 20 TABLET ORAL at 08:21

## 2021-03-08 RX ADMIN — HYDROXYZINE HYDROCHLORIDE 25 MG: 25 TABLET, FILM COATED ORAL at 21:22

## 2021-03-08 RX ADMIN — Medication 25 MG: at 14:53

## 2021-03-08 RX ADMIN — PANTOPRAZOLE SODIUM 40 MG: 40 TABLET, DELAYED RELEASE ORAL at 08:21

## 2021-03-08 RX ADMIN — LACTULOSE 20 G: 20 POWDER, FOR SOLUTION ORAL at 10:45

## 2021-03-08 RX ADMIN — ALBUMIN HUMAN 50 G: 0.25 SOLUTION INTRAVENOUS at 21:13

## 2021-03-08 RX ADMIN — NICOTINE 7 MG/24 HR DAILY TRANSDERMAL PATCH 1 PATCH: at 09:36

## 2021-03-08 RX ADMIN — LACTULOSE 20 G: 20 POWDER, FOR SOLUTION ORAL at 12:51

## 2021-03-08 RX ADMIN — RIFAXIMIN 550 MG: 550 TABLET ORAL at 21:13

## 2021-03-08 RX ADMIN — TORSEMIDE 80 MG: 20 TABLET ORAL at 17:38

## 2021-03-08 RX ADMIN — ACETAMINOPHEN 650 MG: 325 TABLET, FILM COATED ORAL at 03:48

## 2021-03-08 RX ADMIN — Medication 400 MG: at 21:13

## 2021-03-08 RX ADMIN — POTASSIUM CHLORIDE 20 MEQ: 750 TABLET, EXTENDED RELEASE ORAL at 08:22

## 2021-03-08 RX ADMIN — RIFAXIMIN 550 MG: 550 TABLET ORAL at 08:21

## 2021-03-08 RX ADMIN — THIAMINE HCL TAB 100 MG 100 MG: 100 TAB at 08:21

## 2021-03-08 RX ADMIN — LIDOCAINE HYDROCHLORIDE ANHYDROUS 3 ML: 10 INJECTION, SOLUTION INFILTRATION at 11:43

## 2021-03-08 RX ADMIN — MENTHOL 1 PATCH: 205.5 PATCH TOPICAL at 12:51

## 2021-03-08 RX ADMIN — POTASSIUM CHLORIDE 20 MEQ: 750 TABLET, EXTENDED RELEASE ORAL at 16:25

## 2021-03-08 RX ADMIN — FOLIC ACID 1 MG: 1 TABLET ORAL at 08:21

## 2021-03-08 RX ADMIN — PANTOPRAZOLE SODIUM 40 MG: 40 TABLET, DELAYED RELEASE ORAL at 16:24

## 2021-03-08 RX ADMIN — POTASSIUM CHLORIDE 10 MEQ: 750 TABLET, EXTENDED RELEASE ORAL at 08:21

## 2021-03-08 RX ADMIN — SPIRONOLACTONE 50 MG: 25 TABLET, FILM COATED ORAL at 08:22

## 2021-03-08 RX ADMIN — MULTIPLE VITAMINS W/ MINERALS TAB 1 TABLET: TAB at 08:21

## 2021-03-08 RX ADMIN — OXYCODONE HYDROCHLORIDE 5 MG: 5 TABLET ORAL at 03:48

## 2021-03-08 RX ADMIN — OXYCODONE HYDROCHLORIDE 5 MG: 5 TABLET ORAL at 17:38

## 2021-03-08 RX ADMIN — WARFARIN SODIUM 7.5 MG: 7.5 TABLET ORAL at 17:38

## 2021-03-08 RX ADMIN — TORSEMIDE 80 MG: 20 TABLET ORAL at 12:51

## 2021-03-08 RX ADMIN — LIDOCAINE 1 PATCH: 560 PATCH PERCUTANEOUS; TOPICAL; TRANSDERMAL at 08:22

## 2021-03-08 ASSESSMENT — ACTIVITIES OF DAILY LIVING (ADL)
ADLS_ACUITY_SCORE: 22

## 2021-03-08 ASSESSMENT — MIFFLIN-ST. JEOR: SCORE: 1501.88

## 2021-03-08 NOTE — PROGRESS NOTES
[facility]    Cardiology Progress Note- Cards 2        Date of Admission:  2/26/2021     Assessment & Plan: HVSL   Miguel Ángel Henson is a 35 year old male with a PMHx of NICM (LVEF 15%) 2/2 to EtOH abuse, subcutaneous ICD with hx of VT and tobacco abuse who presented to OSH 2/17/20 profoundly volume overloaded with acute on chronic HFrEF, cardiogenic shock and AC requiring transfer to [facility] 2/26/2021 for consideration of advanced therapies. His cardiogenic shock has improved and he is hemodynamically stable on an oral regimen. Liver biopsy has shown fibrosis consistent with cardiac cirrhosis which is prohibitive for advanced cardiac therapies. His hospital course has otherwise been complicated by UGIB due to esophagitis.     Today's Plan:  - Therapeutic paracentesis today by procedure team, goal of ~3L to be taking off if tolerated.  - Continue dobutamine @2.5  - Hold Lovenox given plan for paracentesis, will likely restart bridging lovenox tonight, and warfarin, till target INR 2-3  - Torsemide 80 mg PO TID, goal I/O ~1L/d net negative  - plan to discharge to home with assist once therapeutic INR and stable clinically. Not a candidate for advanced therapy due to cirrhosis, will continue conversation about home inotrope.  - Replace PICC   - Increase Lactulose to 6x daily from 3x, goal of 3BM per day     ---Neuro---  # Category 2 Substance Abuse  - appreciate neuropsych evaluation  - recommend formal chemical dependency assessment  - recommend establishing care with a local psychiatrist or psychologist      ---Cardiovascular---  # Dilated NICM (LVEF 15%, LVIDd 7.7 cm) 2/2 to EtOH   # Acute on chronic HFrEF, NYHA class IV   # Cardiogenic shock   # Medication/clinic follow-up non compliance   Presented to OSH with marked volume overload and cardiogenic shock requiring multiple pressors. Decompensated HF is in setting of medication non-compliance. On admit to [facility], ZAIN  is 2.2 (on dobutamine 3 mcg/kg/min, NE 0.06 and vaso 2.4) with CVP of 8 (small collapsible IVC) after significant volume removal (70 lbs) at OSH. Consideration of advanced therapies limited by biopsy proven cirrhosis   TTE : LVEF 5-10%, LVIDd 8.97 cm, LV apical thrombus, mod-sev MR, mod-sev TR Mild RV dysfunction,  RHC : RA 6, RV 31/6, PA 31/16 (22), PCWP 14, Luis 5.2/3.2, TD 5.4/3.3, SVR 1060, PVR 1.5 on dobutamine 3, vaso 2.4, norepi 0.02  [place] Numbers : CVP 3, PA 28/17 (24), PCWP 12, SvO2 60, CI 2.05, SVR 1634 on vaso 1, norepi 0.06,  2  [place] numbers  CVP 13, PA 36/24, PCWP 16, SvO2 43, CI/CO 2.1/3.6, SVR 1622  -volume: hypervolemic, paracentesis 3/4 2L (plan for one on 3/8/21), torsemide 80 mg PO TID   -spironolactone 50mg every day  -PEth: negative  -neuropsych evaluation: completed     # LV apical thrombus  - heparin gtt--> lovenox  - warfarin     # Subcutaneous ICD with hx of VT  -ICD check unremarkable     ---Renal---  # Hyponatremia, 2/2 to volume overload; significantly improved from presentation though slowly worsening with volume accumulation  -trend BMP   - diuresis as above     # Diuretic induced hypokalemia  - replacement per unit protocol  - spironolactone as above     ---GI---  # Cardiac Cirrhosis  # Portal hypertension   # Ascites s/p multiple paracentesis   # Hx alcohol abuse with alcoholic pancreatis/hepatitis    Biopsy with severe fibrosis/cardiac cirrhosis. Per hepatology: No way to definitively state his liver will have improvement after heart transplant or the impact of recovery/progression with ongoing alcohol use.  -PEth as above   -trend LFTs  -referral to GI as outpatient for ongoing management    -will plan for therapeutic paracentesis 3/8, procedure team consult placed. Hold lovenox tonight x 24 hours for planned paracentesis 3/8/21     # Upper GIB  Patient with coffee ground emesis. Hb 11 > 8. S/p EGD which showed esophagitis, friable mucosa. No evidence of varices.    - 72hrs ceftriaxone for SBP prophylaxis  - continue pantoprazole 40mg bid oral  - hemoglobin checks BID  - s/p 1u pRBC 3/5     # New onset diarrhea  - check stool C Diff toxin/PCR     ---ID---  No acute issues, no growth on cx from paracentesis   -covid negative      ---Endocrine--   No acute issues     ---Hematology---  # Acute Blood Loss Anemia 2/2 UGIB - resolved  -  Last transfused on 3/5/21 with 1U pRBCs goal of Hgb >/= 8  - trend CBC daily     ---Nutrition--   #  Severe Malnutrition  - nutrition consult  - multivitamin     ---Dermatologic--   # Extremity Wounds  # Pressure wound (present on admission)  - care per WOC recommendations     FEN: 2g Na, 1800cc fluid restricted   DVT Prophylaxis: Heparin gtt-->lovenox  Code Status: Full         Disposition Plan   Expected discharge: 2 - 3 days, recommended to prior living arrangement once therapeutic anticoagulation achieved.      Entered: Tabatha Gil MD 03/08/2021, 7:21 AM       The patient's care was discussed with the Attending Physician, Dr. Ayala.    Tabatha Gil MD   PGY-3 Internal Medicine  [facility] [address]  [facility]  Please see sign in/sign out for up to date coverage information  ______________________________________________________________________    Interval History   NAEO. No pain, SOB, dizziness.  Nurse notes reviewed.  I/O net negative 1.5L    Data reviewed today: I reviewed all medications, new labs and imaging results over the last 24 hours.     Physical Exam   Vital Signs: Temp: 98  F (36.7  C) Temp src: Oral BP: 95/58 Pulse: 100   Resp: 18 SpO2: 100 % O2 Device: None (Room air)    Weight: 125 lbs 12.8 oz  Constitutional: awake, alert, no apparent distress, cachectic in appearance  Eyes: Nonicteric, pupils equal  HENT: NCAT, temporal wasting  Respiratory: Nonlabored,  Decreased air movement over the b/l bases, no crackles or wheezes  Cardiovascular: RRR,  III/VI  Holosystolic blowing murmur best appreciated over apex, parasternal heave, laterally displaced PMI; JVP 12 cm/mid neck  GI: Distended abdomen, nonrigid, NT. + fluid wave  Skin: warm and dry, no rash, feet with bandages in place  Musculoskeletal: thin extremities, wwp, no peripheral edema  Neurologic: AAOx3, CN grossly intact, spontaneous movement of all extremities    Data   Recent Labs   Lab 03/08/21  0443 03/07/21  1653 03/07/21  0516 03/06/21  1532 03/06/21  1136 03/06/21  0558   WBC  --   --  6.1 8.3  --  5.4   HGB  --   --  9.5* 9.7*  --  8.9*   MCV  --   --  97 97  --  98   PLT  --   --  322 350  --  282   INR 1.61*  --  1.44*  --   --  1.25*   * 128* 126* 128*  --  127*   POTASSIUM 3.6 4.1 3.6 3.6  --  3.6   CHLORIDE 86* 90* 89* 93*  --  93*   CO2 31 31 30 28  --  28   BUN 14 10 10 13  --  15   CR 0.86 0.75 0.67 0.70  --  0.73   ANIONGAP 8 7 7 7  --  7   ALEKSEY 8.5 8.3* 8.0* 8.4*  --  7.9*   GLC 90 103* 99 115*  --  81   ALBUMIN 2.5*  --  2.6*  --   --  2.4*   PROTTOTAL 6.0*  --  5.6*  --   --  5.0*   BILITOTAL 1.0  --  1.3  --   --  1.5*   ALKPHOS 170*  --  155*  --   --  137   ALT 25  --  23  --   --  22   AST 31  --  34  --   --  29   TROPI  --   --   --   --  0.039  --      No results found for this or any previous visit (from the past 24 hour(s)).  Medications     - MEDICATION INSTRUCTIONS -       DOBUTamine 2.5 mcg/kg/min (03/07/21 2119)     - MEDICATION INSTRUCTIONS -       Warfarin Therapy Reminder         folic acid  1 mg Oral Daily     lactulose  20 g Oral TID     lidocaine  1 patch Transdermal Q24H     lidocaine   Transdermal Q8H     magnesium oxide  400 mg Oral BID     menthol   Transdermal Q8H     multivitamin w/minerals  1 tablet Oral Daily     nicotine  1 patch Transdermal Daily     nicotine   Transdermal Q8H     pantoprazole  40 mg Oral BID AC     potassium chloride  10 mEq Oral Once     potassium chloride  10 mEq Oral Once     potassium chloride  20 mEq Oral BID AC     rifaximin  550 mg  Oral BID     spironolactone  50 mg Oral Daily     vitamin B1  100 mg Oral Daily     torsemide  80 mg Oral TID

## 2021-03-08 NOTE — CONSULTS
Consult and Procedure Service - Procedure Note    Attending:Jessica De Anda MD   Procedure: Diagnostic and therapeutic paracentesis  Indication: Symptomatic abdominal distension  Risk Assessment: Elevated bleeding risk but optimized and within parameters  Pre-procedure diagnosis: Ascites  Post-procedure diagnosis: Same    The risks and benefits of the procedure were explained to Will who expressed understanding and opted to proceed.  Consent was obtained and placed in the chart.  A time out was performed.  An area of ascites was located and marked using ultrasound guidance in the right lower quadrant; the area was prepped and draped in the usual sterile fashion.  10 ml of 1% lidocaine was instilled and ascites located.  The Legal Shine paracentesis catheter and needle were inserted under real-time guidance until ascites obtained then the needle removed and the catheter advanced.  The apparatus was connected to vacuum bottles and a total of 4500 ml of pale yellow colored fluid removed.   A specimen was sent for analysis. The catheter was withdrawn and the area dressed.  Patient tolerated the procedure well with no immediate complications.  Please contact the Consult and Procedure Service if any complications or concerns arise.     Jessica De Anda MD   DOS:  3/8/2021

## 2021-03-08 NOTE — PROCEDURES
[facility]    Single Lumen PICC Placement    Date/Time: 3/8/2021 11:39 AM  Performed by: Daylin Cabrera RN  Authorized by: Chica Perry DO   Indications: IV medication.    UNIVERSAL PROTOCOL   Site Marked: Yes  Prior Images Obtained and Reviewed:  Yes  Required items: Required blood products, implants, devices and special equipment available    Patient identity confirmed:  Verbally with patient, arm band and hospital-assigned identification number  NA - No sedation, light sedation, or local anesthesia  Confirmation Checklist:  Patient's identity using two indicators, relevant allergies, procedure was appropriate and matched the consent or emergent situation and correct equipment/implants were available  Time out: Immediately prior to the procedure a time out was called    Universal Protocol: the Joint Commission Universal Protocol was followed    Preparation: Patient was prepped and draped in usual sterile fashion           ANESTHESIA    Anesthesia: Local infiltration  Local Anesthetic:  Lidocaine 1% without epinephrine  Anesthetic Total (mL):  3      SEDATION    Patient Sedated: No        Preparation: skin prepped with ChloraPrep  Skin prep agent: skin prep agent completely dried prior to procedure  Sterile barriers: maximum sterile barriers were used: cap, mask, sterile gown, sterile gloves, and large sterile sheet  Hand hygiene: hand hygiene performed prior to central venous catheter insertion  Type of line used: Power PICC  Catheter type: single lumen  Lumen type: non-valved  Catheter size: 4 Fr  Brand: Audiam  Lot number: [id number]  Placement method: venipuncture, MST, ultrasound and tip confirmation system  Number of attempts: 1  Successful placement: yes  Orientation: right  Location: basilic vein (vein diameter- 0.48cm)  Site rationale: left AICD  Arm circumference: adults 10 cm  Extremity circumference: 19  Visible catheter length: 0  Total catheter length:  39  Dressing and securement: blood cleaned with CHG, chlorhexidine disc applied, glue, statlock, site cleaned and sterile dressing applied  Post procedure assessment: blood return through all ports, free fluid flow and placement verified by x-ray  PROCEDURE   Patient Tolerance:  Patient tolerated the procedure well with no immediate complications

## 2021-03-08 NOTE — PLAN OF CARE
Problem: Altered Behavior (Delirium)  Goal: Improved Behavioral Control  Outcome: Improving     Problem: Adjustment to Illness (Delirium)  Goal: Optimal Coping  Outcome: Improving     Problem: Cardiac Output Decreased  Goal: Effective Cardiac Output  Outcome: Improving  Intervention: Optimize Cardiac Output  Recent Flowsheet Documentation  Taken 3/8/2021 0800 by Daisy Ashley, RN  VTE Prevention/Management: ambulation promoted  Head of Bed (HOB) Positioning: HOB at 30-45 degrees   Pt Aaox3, flat slow to answer question or fallow commands, SR-ST RA denies any sob. Abd distended, tender with palpitation.Still waiting on paracentesis. XL loss BM.           Dobutamine 2.5kg/hr/min (3.9ml/hr) infusing to a new L Picc. No s/s of distress. Denies any pain. Sitter at BS. CB and phone in reach. Will continue to monitor.  .Daisy Ashley, RN

## 2021-03-08 NOTE — PLAN OF CARE
D: Transferred from OSH 2/26 for advanced therapies after being admitted with HF (EF 15%), cardiogenic shock, and CA. Liver biopsy revealing cardiac cirrhosis. Course c/b UGIB d/t esophagitis. Hx of NICM 2/2 EtOH abuse, VT s/p ICD, tobacco use     I/A: Disoriented to situation, forgetful, verbally/sexuallly inappropriate at times, redirectable. VSS on RA. SR/ST on tele. Endorses back and generalized pain managed with PRN tylenol, oxy x1 and icy/hot patch. Dobutamine gtt @2.5mcg/kg/min via L PIV. Tolerating 2gNa diet with 1.8L FR with fair appetite. Skin cares as per orders. Intermittently incontinent. LBM 3/7. Up SBA. Attendant at bedside for safety as pt frequently pulls at lines.    P: Therapeutic paracentesis today. Will need new PICC placment, (pt able to consent given mentation?). Discharge to home with assist and possible home inotrope once therapeutic INR and medically stable. Continue to monitor and notify Cards 2 with changes/concerns.

## 2021-03-08 NOTE — PLAN OF CARE
"  Problem: Altered Behavior (Delirium)  Goal: Improved Behavioral Control  Outcome: Improving     Problem: Patient Goal: Social Work Focus  Goal: 1. Patient Goal: Care Coordination / Social Work  Outcome: Improving     Pt Alert, confused, doesn't fallow commands are answer questions appropriately. Pulled out PICC .Yelling out of room\" I want to leave\" tried to hit sitter and I. Pt hands held by sitter until pt laid back in bed. Zyprex IM given. R PIV inserted until PICC can be replaced Dobutamine 2.5mcg/kg/min (3.9 ml/hr) restarted. Paracenteses tomorrow? No s/s of distress, c/o of back pain, tylenol PRN given. Sitter at Bedside. CB and phone in reach. Will Continue to monitor.  .Daisy Ashley RN      "

## 2021-03-08 NOTE — PROGRESS NOTES
Care Management Follow Up    Length of Stay (days): 10    Expected Discharge Date: 03/11/21     Concerns to be Addressed: Home Infusion, PCP follow up  Patient plan of care discussed at interdisciplinary rounds: Yes    Anticipated Discharge Disposition: Home with SO. Other (Comments)(new warfarin monitoring)     Anticipated Discharge Services: Home Infusion  Anticipated Discharge DME: None    Referrals Placed by CM/SW: [facility]  Private pay costs discussed: N/A    Additional Information:  Per MD, pt will likely need to discharge home on IV dobutamine. This writer discussed home infusion with pt's SO Tamie as pt currently still intermittently confused. Tamie confirmed that she would be willing/able to learn how to administer IV dobutamine for pt at home. Tamie plans to come to [facility] tomorrow after work and can stay until pt is discharged (tentatively Weds or Thurs.)   Benefit check was completed with [facility] and pt has 100% coverage for home IV dobutamine. PLC tentatively scheduled for Thurs 3/11 at 2:30pm (request pt be on waitlist of earlier time.)    Intervention  Rescheduled PCP follow up:     [facility]  Primary Care MD: Dr Carlton Jacome   Address: [address] [place] [address]   Phone: [phone]   Fax: [phone]    Appointment on Friday 3/12/21 @ 2:40pm---please arrive at 2:25pm    Orders placed for [facility] for home IV dobutamine and PICC line cares/supplies.     CC will continue to monitor patient's medical condition and progress towards discharge.  Estefany Paul RN BSN  6C Unit Care Coordinator  Phone number: [phone]  Pager: [phone]

## 2021-03-08 NOTE — PROGRESS NOTES
1930 Up to the room to insert PICC line but patient confused unable to sign consent. Patient's nurse notified MD and ok to place PICC line tomorrow. Patient has piv.

## 2021-03-09 ENCOUNTER — APPOINTMENT (OUTPATIENT)
Dept: OCCUPATIONAL THERAPY | Facility: CLINIC | Age: 36
End: 2021-03-09
Attending: INTERNAL MEDICINE
Payer: MEDICAID

## 2021-03-09 LAB
ALBUMIN SERPL-MCNC: 3.3 G/DL (ref 3.4–5)
ALP SERPL-CCNC: 158 U/L (ref 40–150)
ALT SERPL W P-5'-P-CCNC: 25 U/L (ref 0–70)
ANION GAP SERPL CALCULATED.3IONS-SCNC: 6 MMOL/L (ref 3–14)
ANION GAP SERPL CALCULATED.3IONS-SCNC: 7 MMOL/L (ref 3–14)
AST SERPL W P-5'-P-CCNC: 19 U/L (ref 0–45)
BACTERIA SPEC CULT: NO GROWTH
BASE EXCESS BLDV CALC-SCNC: 10.5 MMOL/L
BASE EXCESS BLDV CALC-SCNC: 9.3 MMOL/L
BILIRUB SERPL-MCNC: 0.9 MG/DL (ref 0.2–1.3)
BUN SERPL-MCNC: 19 MG/DL (ref 7–30)
BUN SERPL-MCNC: 19 MG/DL (ref 7–30)
CALCIUM SERPL-MCNC: 8.5 MG/DL (ref 8.5–10.1)
CALCIUM SERPL-MCNC: 9.1 MG/DL (ref 8.5–10.1)
CHLORIDE SERPL-SCNC: 84 MMOL/L (ref 94–109)
CHLORIDE SERPL-SCNC: 88 MMOL/L (ref 94–109)
CO2 SERPL-SCNC: 33 MMOL/L (ref 20–32)
CO2 SERPL-SCNC: 34 MMOL/L (ref 20–32)
COTININE SERPL-MCNC: 534 NG/ML
CREAT SERPL-MCNC: 0.99 MG/DL (ref 0.66–1.25)
CREAT SERPL-MCNC: 1.07 MG/DL (ref 0.66–1.25)
ERYTHROCYTE [DISTWIDTH] IN BLOOD BY AUTOMATED COUNT: 15.9 % (ref 10–15)
GFR SERPL CREATININE-BSD FRML MDRD: 88 ML/MIN/{1.73_M2}
GFR SERPL CREATININE-BSD FRML MDRD: >90 ML/MIN/{1.73_M2}
GLUCOSE SERPL-MCNC: 100 MG/DL (ref 70–99)
GLUCOSE SERPL-MCNC: 112 MG/DL (ref 70–99)
HCO3 BLDV-SCNC: 36 MMOL/L (ref 21–28)
HCO3 BLDV-SCNC: 37 MMOL/L (ref 21–28)
HCT VFR BLD AUTO: 30.1 % (ref 40–53)
HGB BLD-MCNC: 9.8 G/DL (ref 13.3–17.7)
INR PPP: 1.77 (ref 0.86–1.14)
MAGNESIUM SERPL-MCNC: 1.9 MG/DL (ref 1.6–2.3)
MCH RBC QN AUTO: 30.9 PG (ref 26.5–33)
MCHC RBC AUTO-ENTMCNC: 32.6 G/DL (ref 31.5–36.5)
MCV RBC AUTO: 95 FL (ref 78–100)
NICOTINE SERPL-MCNC: 8.8 NG/ML
O2/TOTAL GAS SETTING VFR VENT: ABNORMAL %
O2/TOTAL GAS SETTING VFR VENT: ABNORMAL %
OXYHGB MFR BLDV: 30 %
OXYHGB MFR BLDV: 31 %
PCO2 BLDV: 51 MM HG (ref 40–50)
PCO2 BLDV: 58 MM HG (ref 40–50)
PH BLDV: 7.41 PH (ref 7.32–7.43)
PH BLDV: 7.46 PH (ref 7.32–7.43)
PLATELET # BLD AUTO: 317 10E9/L (ref 150–450)
PO2 BLDV: 23 MM HG (ref 25–47)
PO2 BLDV: 23 MM HG (ref 25–47)
POTASSIUM SERPL-SCNC: 3.2 MMOL/L (ref 3.4–5.3)
POTASSIUM SERPL-SCNC: 4.7 MMOL/L (ref 3.4–5.3)
PROT SERPL-MCNC: 6.4 G/DL (ref 6.8–8.8)
RBC # BLD AUTO: 3.17 10E12/L (ref 4.4–5.9)
SODIUM SERPL-SCNC: 125 MMOL/L (ref 133–144)
SODIUM SERPL-SCNC: 127 MMOL/L (ref 133–144)
SPECIMEN SOURCE: NORMAL
WBC # BLD AUTO: 6.3 10E9/L (ref 4–11)

## 2021-03-09 PROCEDURE — 85610 PROTHROMBIN TIME: CPT | Performed by: INTERNAL MEDICINE

## 2021-03-09 PROCEDURE — 83735 ASSAY OF MAGNESIUM: CPT | Performed by: INTERNAL MEDICINE

## 2021-03-09 PROCEDURE — 97110 THERAPEUTIC EXERCISES: CPT | Mod: GO | Performed by: OCCUPATIONAL THERAPIST

## 2021-03-09 PROCEDURE — 82805 BLOOD GASES W/O2 SATURATION: CPT | Performed by: STUDENT IN AN ORGANIZED HEALTH CARE EDUCATION/TRAINING PROGRAM

## 2021-03-09 PROCEDURE — 82805 BLOOD GASES W/O2 SATURATION: CPT | Performed by: INTERNAL MEDICINE

## 2021-03-09 PROCEDURE — 36415 COLL VENOUS BLD VENIPUNCTURE: CPT | Performed by: INTERNAL MEDICINE

## 2021-03-09 PROCEDURE — 250N000013 HC RX MED GY IP 250 OP 250 PS 637: Performed by: STUDENT IN AN ORGANIZED HEALTH CARE EDUCATION/TRAINING PROGRAM

## 2021-03-09 PROCEDURE — 250N000011 HC RX IP 250 OP 636: Performed by: STUDENT IN AN ORGANIZED HEALTH CARE EDUCATION/TRAINING PROGRAM

## 2021-03-09 PROCEDURE — 250N000013 HC RX MED GY IP 250 OP 250 PS 637: Performed by: INTERNAL MEDICINE

## 2021-03-09 PROCEDURE — 97530 THERAPEUTIC ACTIVITIES: CPT | Mod: GO | Performed by: OCCUPATIONAL THERAPIST

## 2021-03-09 PROCEDURE — 99232 SBSQ HOSP IP/OBS MODERATE 35: CPT | Mod: GC | Performed by: INTERNAL MEDICINE

## 2021-03-09 PROCEDURE — 250N000013 HC RX MED GY IP 250 OP 250 PS 637

## 2021-03-09 PROCEDURE — 36415 COLL VENOUS BLD VENIPUNCTURE: CPT | Performed by: STUDENT IN AN ORGANIZED HEALTH CARE EDUCATION/TRAINING PROGRAM

## 2021-03-09 PROCEDURE — 85027 COMPLETE CBC AUTOMATED: CPT | Performed by: INTERNAL MEDICINE

## 2021-03-09 PROCEDURE — 80053 COMPREHEN METABOLIC PANEL: CPT | Performed by: INTERNAL MEDICINE

## 2021-03-09 PROCEDURE — 80048 BASIC METABOLIC PNL TOTAL CA: CPT | Performed by: INTERNAL MEDICINE

## 2021-03-09 PROCEDURE — 214N000001 HC R&B CCU UMMC

## 2021-03-09 RX ORDER — POTASSIUM CHLORIDE 750 MG/1
20 TABLET, EXTENDED RELEASE ORAL
Status: DISCONTINUED | OUTPATIENT
Start: 2021-03-09 | End: 2021-03-09 | Stop reason: ALTCHOICE

## 2021-03-09 RX ORDER — POTASSIUM CHLORIDE 1.5 G/1.58G
20 POWDER, FOR SOLUTION ORAL 2 TIMES DAILY WITH MEALS
Status: DISCONTINUED | OUTPATIENT
Start: 2021-03-09 | End: 2021-03-10 | Stop reason: HOSPADM

## 2021-03-09 RX ORDER — POTASSIUM CHLORIDE 1.5 G/1.58G
20 POWDER, FOR SOLUTION ORAL ONCE
Status: COMPLETED | OUTPATIENT
Start: 2021-03-09 | End: 2021-03-09

## 2021-03-09 RX ORDER — WARFARIN SODIUM 10 MG/1
10 TABLET ORAL
Status: COMPLETED | OUTPATIENT
Start: 2021-03-09 | End: 2021-03-09

## 2021-03-09 RX ORDER — POTASSIUM CHLORIDE 20MEQ/15ML
60 LIQUID (ML) ORAL ONCE
Status: COMPLETED | OUTPATIENT
Start: 2021-03-09 | End: 2021-03-09

## 2021-03-09 RX ADMIN — PANTOPRAZOLE SODIUM 40 MG: 40 TABLET, DELAYED RELEASE ORAL at 17:41

## 2021-03-09 RX ADMIN — NICOTINE 7 MG/24 HR DAILY TRANSDERMAL PATCH 1 PATCH: at 08:53

## 2021-03-09 RX ADMIN — ENOXAPARIN SODIUM 50 MG: 60 INJECTION SUBCUTANEOUS at 20:40

## 2021-03-09 RX ADMIN — OXYCODONE HYDROCHLORIDE 5 MG: 5 TABLET ORAL at 17:41

## 2021-03-09 RX ADMIN — MULTIPLE VITAMINS W/ MINERALS TAB 1 TABLET: TAB at 08:53

## 2021-03-09 RX ADMIN — THIAMINE HCL TAB 100 MG 100 MG: 100 TAB at 08:54

## 2021-03-09 RX ADMIN — Medication 400 MG: at 19:14

## 2021-03-09 RX ADMIN — RIFAXIMIN 550 MG: 550 TABLET ORAL at 19:14

## 2021-03-09 RX ADMIN — SPIRONOLACTONE 50 MG: 25 TABLET, FILM COATED ORAL at 08:53

## 2021-03-09 RX ADMIN — OXYCODONE HYDROCHLORIDE 5 MG: 5 TABLET ORAL at 02:21

## 2021-03-09 RX ADMIN — OXYCODONE HYDROCHLORIDE 5 MG: 5 TABLET ORAL at 21:59

## 2021-03-09 RX ADMIN — WARFARIN SODIUM 10 MG: 10 TABLET ORAL at 17:41

## 2021-03-09 RX ADMIN — LIDOCAINE 1 PATCH: 560 PATCH PERCUTANEOUS; TOPICAL; TRANSDERMAL at 08:54

## 2021-03-09 RX ADMIN — LIDOCAINE 1 PATCH: 560 PATCH PERCUTANEOUS; TOPICAL; TRANSDERMAL at 19:13

## 2021-03-09 RX ADMIN — ACETAMINOPHEN 650 MG: 325 TABLET, FILM COATED ORAL at 21:59

## 2021-03-09 RX ADMIN — POTASSIUM CHLORIDE 20 MEQ: 1.5 POWDER, FOR SOLUTION ORAL at 10:37

## 2021-03-09 RX ADMIN — FOLIC ACID 1 MG: 1 TABLET ORAL at 08:53

## 2021-03-09 RX ADMIN — POTASSIUM CHLORIDE 20 MEQ: 1.5 POWDER, FOR SOLUTION ORAL at 17:41

## 2021-03-09 RX ADMIN — Medication 400 MG: at 08:53

## 2021-03-09 RX ADMIN — PANTOPRAZOLE SODIUM 40 MG: 40 TABLET, DELAYED RELEASE ORAL at 08:53

## 2021-03-09 RX ADMIN — Medication 5 ML: at 13:57

## 2021-03-09 RX ADMIN — OXYCODONE HYDROCHLORIDE 5 MG: 5 TABLET ORAL at 10:47

## 2021-03-09 RX ADMIN — TORSEMIDE 80 MG: 20 TABLET ORAL at 08:53

## 2021-03-09 RX ADMIN — RIFAXIMIN 550 MG: 550 TABLET ORAL at 08:53

## 2021-03-09 RX ADMIN — ACETAMINOPHEN 650 MG: 325 TABLET, FILM COATED ORAL at 02:21

## 2021-03-09 RX ADMIN — LACTULOSE 20 G: 20 POWDER, FOR SOLUTION ORAL at 20:39

## 2021-03-09 RX ADMIN — ACETAMINOPHEN 650 MG: 325 TABLET, FILM COATED ORAL at 17:41

## 2021-03-09 RX ADMIN — LACTULOSE 20 G: 20 POWDER, FOR SOLUTION ORAL at 17:41

## 2021-03-09 RX ADMIN — LACTULOSE 20 G: 20 POWDER, FOR SOLUTION ORAL at 08:54

## 2021-03-09 RX ADMIN — ENOXAPARIN SODIUM 50 MG: 60 INJECTION SUBCUTANEOUS at 10:38

## 2021-03-09 RX ADMIN — POTASSIUM CHLORIDE 60 MEQ: 20 SOLUTION ORAL at 09:06

## 2021-03-09 RX ADMIN — DOBUTAMINE HYDROCHLORIDE 2.5 MCG/KG/MIN: 200 INJECTION INTRAVENOUS at 12:45

## 2021-03-09 RX ADMIN — LACTULOSE 20 G: 20 POWDER, FOR SOLUTION ORAL at 10:36

## 2021-03-09 ASSESSMENT — ACTIVITIES OF DAILY LIVING (ADL)
ADLS_ACUITY_SCORE: 22
ADLS_ACUITY_SCORE: 20
ADLS_ACUITY_SCORE: 22
ADLS_ACUITY_SCORE: 22

## 2021-03-09 ASSESSMENT — MIFFLIN-ST. JEOR: SCORE: 1444.25

## 2021-03-09 NOTE — PROGRESS NOTES
Care Management Follow Up    Length of Stay (days): 11    Expected Discharge Date: 03/10/21     Concerns to be Addressed: Home Infusion  Patient plan of care discussed at interdisciplinary rounds: Yes    Anticipated Discharge Disposition: Home with SO     Anticipated Discharge Services: Home Infusion, Outpatient INR and warfarin monitoring.  Anticipated Discharge DME: None    Patient/family educated on Medicare website which has current facility and service quality ratings: Yes  Education Provided on the Discharge Plan: Yes  Patient/Family in Agreement with the Plan: Yes    Referrals Placed by CM/SW: Home Infusion, Outpt INR and warfarin monitoring  Private pay costs discussed: N/A    Additional Information:  This writer received update from the cardiology team that pt may be medically stable for discharge tomorrow, mentation improving. The soonest available PLC is Thursday 3/11. This writer discussed with ANDRA weaver and she can plan to teach pt's SO tomorrow at [facility] and then [facility] will follow up teach at home on Thursday. Cards 2 team updated and requested orders be signed as early as possible tomorrow. Pt's SO, Tamie updated and she confirmed that she is coming down from [place] this evening.     CC will continue to monitor patient's medical condition and progress towards discharge.  Estefany Paul RN BSN  6C Unit Care Coordinator  Phone number: [phone]  Pager: [phone]

## 2021-03-09 NOTE — PROGRESS NOTES
[facility] [place]    Cardiology Progress Note- Cards 2        Date of Admission:  2/26/2021     Assessment & Plan: HVS   Miguel Ángel Henson is a 35 year old male with a PMHx of NICM (LVEF 15%) 2/2 to EtOH abuse, subcutaneous ICD with hx of VT and tobacco abuse who presented to OSH 2/17/20 profoundly volume overloaded with acute on chronic HFrEF, cardiogenic shock and AC requiring transfer to [facility] 2/26/2021 for consideration of advanced therapies. His cardiogenic shock has improved and he is hemodynamically stable on an oral regimen. Liver biopsy has shown fibrosis consistent with cardiac cirrhosis which is prohibitive for advanced cardiac therapies. His hospital course has otherwise been complicated by UGIB (resolved) due to esophagitis and encephalopathy (improved).     Today's Plan:  - Euvolemic on exam today, will hold further diuresis today and re-evaluate tomorrow  - Continue dobutamine @2.5  - Continue Lovenox to bridge to warfarin, till target INR 2-3  - Home inotrope teaching planned for 3/11/21  - plan to discharge to home with assist once therapeutic INR and inotrope teaching is completed. Not a candidate for advanced therapy due to cirrhosis  - Reduce Lactulose to 4x daily from 6x, goal of 3BM per day     ---Neuro---  # Category 2 Substance Abuse  - appreciate neuropsych evaluation  - recommend formal chemical dependency assessment  - recommend establishing care with a local psychiatrist or psychologist, can continue outpatient       ---Cardiovascular---  # Dilated NICM (LVEF 15%, LVIDd 7.7 cm) 2/2 to EtOH   # Acute on chronic HFrEF, NYHA class IV   # Cardiogenic shock   # Medication/clinic follow-up non compliance   Presented to OSH with marked volume overload and cardiogenic shock requiring multiple pressors. Decompensated HF is in setting of medication non-compliance. On admit to [facility], CI was 2.2 (on dobutamine 3 mcg/kg/min, NE 0.06 and vaso 2.4) with CVP  of 8 (small collapsible IVC) after significant volume removal (70 lbs) at OSH. Consideration of advanced therapies limited by biopsy proven cirrhosis and recent/continuing EtOH use.  TTE : LVEF 5-10%, LVIDd 8.97 cm, LV apical thrombus, mod-sev MR, mod-sev TR Mild RV dysfunction,  RHC : RA 6, RV 31/6, PA 31/16 (22), PCWP 14, Luis 5.2/3.2, TD 5.4/3.3, SVR 1060, PVR 1.5 on dobutamine 3, vaso 2.4, norepi 0.02  Mulino Numbers : CVP 3, PA 28/17 (24), PCWP 12, SvO2 60, CI 2.05, SVR 1634 on vaso 1, norepi 0.06,  2  Mulino numbers  CVP 13, PA 36/24, PCWP 16, SvO2 43, CI/CO 2.1/3.6, SVR 1622  -volume: hypervolemic, paracentesis 3/4 2L (plan for one on 3/8/21), torsemide 80 mg PO TID   -spironolactone 50mg every day  -PEth: negative  -neuropsych evaluation: completed     # LV apical thrombus  - heparin gtt--> lovenox bridging to warfarin with goal INR: 2-3     # Subcutaneous ICD with hx of VT  -ICD check unremarkable     ---Renal---  # Hyponatremia, 2/2 to volume overload; significantly improved from presentation though slowly worsening with volume accumulation  -trend BMP   - diuresis as above     # Diuretic induced hypokalemia  - replacement per unit protocol  - spironolactone as above     ---GI---  # Cardiac Cirrhosis  # Portal hypertension   # Ascites s/p multiple paracentesis   # Hx alcohol abuse with alcoholic pancreatis/hepatitis    Biopsy with severe fibrosis/cardiac cirrhosis. Per hepatology: No way to definitively state his liver will have improvement after heart transplant or the impact of recovery/progression with ongoing alcohol use.  -PEth as above   -trend LFTs  -referral to GI as outpatient for ongoing management    - Last therapeutic paracentesis was 3/8 with 4.5L removed      # Upper GIB - resolved  Coffee ground emesis on 3/4/21. Hb 11 > 8. S/p EGD which showed esophagitis, friable mucosa. No evidence of varices.   - 72hrs ceftriaxone for SBP prophylaxis  - continue pantoprazole 40mg bid  oral  - hemoglobin checks BID  - s/p 1u pRBC 3/5     # New onset diarrhea  - check stool C Diff toxin/PCR     ---ID---  No acute issues, no growth on cx from paracentesis   -covid negative      ---Endocrine--   No acute issues     ---Hematology---  # Acute Blood Loss Anemia 2/2 UGIB - resolved  -  Last transfused on 3/5/21 with 1U pRBCs goal of Hgb >/= 8  - trend CBC daily     ---Nutrition--   #  Severe Malnutrition  - nutrition consult  - multivitamin     ---Dermatologic--   # Extremity Wounds  # Pressure wound (present on admission)  - care per WOC recommendations     FEN: 2g Na, 1800cc fluid restricted   DVT Prophylaxis: Heparin gtt-->lovenox  Code Status: Full         Disposition Plan   Expected discharge: 2 - 3 days, recommended to prior living arrangement once therapeutic anticoagulation achieved.      Entered: Tabatha Gil MD 03/09/2021, 6:56 AM       The patient's care was discussed with the Attending Physician, Dr. Ayala.    Tabatha Gil MD   PGY-3 Internal Medicine  [facility] [address]  [facility]  Please see sign in/sign out for up to date coverage information  ______________________________________________________________________    Interval History   NAEO. No pain, SOB, dizziness.  Nurse notes reviewed.  Paracentesis yesterday with 4.5L removed  Data reviewed today: I reviewed all medications, new labs and imaging results over the last 24 hours.     Physical Exam   Vital Signs: Temp: 98  F (36.7  C) Temp src: Oral BP: 101/66 Pulse: 110   Resp: 16 SpO2: 98 % O2 Device: None (Room air)    Weight: 113 lbs 1.54 oz  Constitutional: awake, alert, no apparent distress, cachectic in appearance  Eyes: Nonicteric, pupils equal  HENT: NCAT, temporal wasting  Respiratory: Nonlabored,  Decreased air movement over the b/l bases, no crackles or wheezes  Cardiovascular: RRR,  III/VI Holosystolic blowing murmur best appreciated over apex,  parasternal heave, laterally displaced PMI; JVP at clavicle  GI: Distended abdomen, nonrigid, NT. + fluid wave  Skin: warm and dry, no rash, feet with bandages in place  Musculoskeletal: thin extremities, wwp, no peripheral edema  Neurologic: AAOx3, CN grossly intact, spontaneous movement of all extremities    Data   Recent Labs   Lab 03/09/21  0532 03/08/21  1547 03/08/21  0443 03/07/21  0516 03/07/21  0516 03/06/21  1532 03/06/21  1136   WBC 6.3  --   --   --  6.1 8.3  --    HGB 9.8*  --   --   --  9.5* 9.7*  --    MCV 95  --   --   --  97 97  --      --   --   --  322 350  --    INR 1.77*  --  1.61*  --  1.44*  --   --    * 125* 125*   < > 126* 128*  --    POTASSIUM 3.2* 3.6 3.6   < > 3.6 3.6  --    CHLORIDE 84* 86* 86*   < > 89* 93*  --    CO2 34* 28 31   < > 30 28  --    BUN 19 16 14   < > 10 13  --    CR 1.07 0.80 0.86   < > 0.67 0.70  --    ANIONGAP 7 11 8   < > 7 7  --    ALEKSEY 8.5 8.9 8.5   < > 8.0* 8.4*  --    * 123* 90   < > 99 115*  --    ALBUMIN 3.3*  --  2.5*  --  2.6*  --   --    PROTTOTAL 6.4*  --  6.0*  --  5.6*  --   --    BILITOTAL 0.9  --  1.0  --  1.3  --   --    ALKPHOS 158*  --  170*  --  155*  --   --    ALT 25  --  25  --  23  --   --    AST 19  --  31  --  34  --   --    TROPI  --   --   --   --   --   --  0.039    < > = values in this interval not displayed.     Recent Results (from the past 24 hour(s))   XR Chest Port 1 View    Narrative    XR CHEST PORT 1 VW  3/8/2021 12:07 PM      HISTORY: PICC    COMPARISON: Chest radiograph 3/1/2021    FINDINGS:   Portable upright AP radiograph of the chest. Interval removal of right  Bunnell-Richy catheter. Right upper extremity PICC tip projects over the  high right atrium. Left chest wall cardiac device with lead in stable  position.    Trachea is midline. Cardiac silhouette is within normal limits.  Pulmonary vasculature is distinct. No focal airspace opacity, pleural  effusion, or pneumothorax.      Impression    IMPRESSION:   1.  Right upper extremity PICC tip projects over the high right atrium.  2. No acute cardiopulmonary abnormalities.    I have personally reviewed the examination and initial interpretation  and I agree with the findings.    DELMIS WORRELL MD   POC US Guide for Paracentesis    Impression    Consult and Procedure Service - Procedure Note    Attending:Jessica De Anda MD   Procedure: Diagnostic and therapeutic paracentesis  Indication: Symptomatic abdominal distension  Risk Assessment: Elevated bleeding risk but optimized and within parameters  Pre-procedure diagnosis: Ascites  Post-procedure diagnosis: Same    The risks and benefits of the procedure were explained to Will who expressed understanding and opted to proceed.  Consent was obtained and placed in the chart.  A time out was performed.  An area of ascites was located and marked using ultrasound guidance in the right lower quadrant; the area was prepped and draped in the usual sterile fashion.  10 ml of 1% lidocaine was instilled and ascites located.  The People Pattern paracentesis catheter and needle were inserted under real-time guidance until ascites obtained then the needle removed and the catheter advanced.  The apparatus was connected to vacuum bottles and a total of 4500 ml of pale yellow colored fluid removed.   A specimen was sent for analysis. The catheter was withdrawn and the area dressed.  Patient tolerated the procedure well with no immediate complications.  Please contact the Consult and Procedure Service if any complications or concerns arise.     Jessica De Anda MD   DOS:  3/8/2021        Medications     - MEDICATION INSTRUCTIONS -       DOBUTamine 2.5 mcg/kg/min (03/09/21 0055)     - MEDICATION INSTRUCTIONS -       Warfarin Therapy Reminder         folic acid  1 mg Oral Daily     heparin lock flush  5-10 mL Intracatheter Q24H     lactulose  20 g Oral 6x Daily     lidocaine  1 patch Transdermal Q24H     lidocaine   Transdermal Q8H     magnesium oxide  400 mg  Oral BID     menthol   Transdermal Q8H     multivitamin w/minerals  1 tablet Oral Daily     nicotine  1 patch Transdermal Daily     nicotine   Transdermal Q8H     pantoprazole  40 mg Oral BID AC     potassium chloride  20 mEq Oral BID AC     rifaximin  550 mg Oral BID     spironolactone  50 mg Oral Daily     vitamin B1  100 mg Oral Daily     torsemide  80 mg Oral TID

## 2021-03-09 NOTE — PLAN OF CARE
"/66 (BP Location: Left arm)   Pulse 110   Temp 98  F (36.7  C) (Oral)   Resp 16   Ht 1.778 m (5' 10\")   Wt 51.3 kg (113 lb 1.5 oz)   SpO2 98%   BMI 16.23 kg/m      D: Transferred from OSH 2/26 for advanced therapies after being admitted with HF (EF 15%), cardiogenic shock, and AC. Liver biopsy revealing cardiac cirrhosis. Course c/b UGIB d/t esophagitis. Hx of NICM 2/2 EtOH abuse, VT s/p ICD, tobacco use     I/A: Disoriented to situation and sometimes forgetful.  AVSS on RA. SR/ST on tele.  S/P paracentesis RUQ, with bandage in place no oozing noted, patient c/o pain in the abd and back took tylenol and oxycodone with relief.  He is on 2-gram sodium diet with 1.8L fluid restriction.  New PICC line was placed on 3/8 and Dobutamine infusing @2.5mcg/kg/min.  Patient wearing pull-ups and intermittently incontinent of urine and stool.  Attendant at bedside for safety however, meditation showing some improvements.      P:  Will continue to monitor and notify Cards 2 with changes/concerns.  "

## 2021-03-09 NOTE — PLAN OF CARE
Six lose large bowel movements. Paracentesis done at bedside - albumin given. A/O x4 states feeling over well better. Took a shower. Dobutamine continues w/ 1:1 sitter.       Problem: Adult Inpatient Plan of Care  Goal: Optimal Comfort and Wellbeing  Outcome: Improving     Problem: Adult Inpatient Plan of Care  Goal: Readiness for Transition of Care  Outcome: Improving

## 2021-03-09 NOTE — SUMMARY OF CARE
-Pt was seen by the primary group of physicians who instructed that the Pt be taken off of 1:1 staffing.  -The Pt has bed alarm activated, is encouraged to call for help and is frequently checked every 15 mins.  -Pt eating the majority of each meal.  -Pt tries to circumvent his fluid restriction.  -Pt makes many unusual claims about his situation such as alcohol did not helen his heart problems, instead it was the medications prescribed by his physicians that caused his illness. The Pt also claims to be a devoted healthy eater and yet seeks candy and treats as his priority things to eat.

## 2021-03-10 ENCOUNTER — HOME INFUSION (PRE-WILLOW HOME INFUSION) (OUTPATIENT)
Dept: PHARMACY | Facility: CLINIC | Age: 36
End: 2021-03-10

## 2021-03-10 ENCOUNTER — PATIENT OUTREACH (OUTPATIENT)
Dept: CARE COORDINATION | Facility: CLINIC | Age: 36
End: 2021-03-10

## 2021-03-10 ENCOUNTER — TELEPHONE (OUTPATIENT)
Dept: CARDIOLOGY | Facility: CLINIC | Age: 36
End: 2021-03-10

## 2021-03-10 ENCOUNTER — APPOINTMENT (OUTPATIENT)
Dept: OCCUPATIONAL THERAPY | Facility: CLINIC | Age: 36
End: 2021-03-10
Attending: INTERNAL MEDICINE
Payer: MEDICAID

## 2021-03-10 VITALS
WEIGHT: 118.8 LBS | RESPIRATION RATE: 16 BRPM | OXYGEN SATURATION: 100 % | DIASTOLIC BLOOD PRESSURE: 82 MMHG | HEART RATE: 114 BPM | BODY MASS INDEX: 17.01 KG/M2 | SYSTOLIC BLOOD PRESSURE: 101 MMHG | TEMPERATURE: 98.3 F | HEIGHT: 70 IN

## 2021-03-10 LAB
ALBUMIN SERPL-MCNC: 2.8 G/DL (ref 3.4–5)
ALP SERPL-CCNC: 138 U/L (ref 40–150)
ALT SERPL W P-5'-P-CCNC: 24 U/L (ref 0–70)
ANION GAP SERPL CALCULATED.3IONS-SCNC: 6 MMOL/L (ref 3–14)
ANION GAP SERPL CALCULATED.3IONS-SCNC: 6 MMOL/L (ref 3–14)
AST SERPL W P-5'-P-CCNC: 28 U/L (ref 0–45)
BASE EXCESS BLDV CALC-SCNC: 6.5 MMOL/L
BASE EXCESS BLDV CALC-SCNC: 7.7 MMOL/L
BILIRUB SERPL-MCNC: 0.7 MG/DL (ref 0.2–1.3)
BUN SERPL-MCNC: 19 MG/DL (ref 7–30)
BUN SERPL-MCNC: 22 MG/DL (ref 7–30)
CALCIUM SERPL-MCNC: 8.5 MG/DL (ref 8.5–10.1)
CALCIUM SERPL-MCNC: 9 MG/DL (ref 8.5–10.1)
CHLORIDE SERPL-SCNC: 86 MMOL/L (ref 94–109)
CHLORIDE SERPL-SCNC: 88 MMOL/L (ref 94–109)
CO2 SERPL-SCNC: 30 MMOL/L (ref 20–32)
CO2 SERPL-SCNC: 32 MMOL/L (ref 20–32)
CREAT SERPL-MCNC: 0.86 MG/DL (ref 0.66–1.25)
CREAT SERPL-MCNC: 0.9 MG/DL (ref 0.66–1.25)
ERYTHROCYTE [DISTWIDTH] IN BLOOD BY AUTOMATED COUNT: 15.8 % (ref 10–15)
GFR SERPL CREATININE-BSD FRML MDRD: >90 ML/MIN/{1.73_M2}
GFR SERPL CREATININE-BSD FRML MDRD: >90 ML/MIN/{1.73_M2}
GLUCOSE SERPL-MCNC: 100 MG/DL (ref 70–99)
GLUCOSE SERPL-MCNC: 115 MG/DL (ref 70–99)
HCO3 BLDV-SCNC: 31 MMOL/L (ref 21–28)
HCO3 BLDV-SCNC: 34 MMOL/L (ref 21–28)
HCT VFR BLD AUTO: 26.8 % (ref 40–53)
HGB BLD-MCNC: 8.9 G/DL (ref 13.3–17.7)
INR PPP: 1.89 (ref 0.86–1.14)
MAGNESIUM SERPL-MCNC: 1.6 MG/DL (ref 1.6–2.3)
MCH RBC QN AUTO: 32.2 PG (ref 26.5–33)
MCHC RBC AUTO-ENTMCNC: 33.2 G/DL (ref 31.5–36.5)
MCV RBC AUTO: 97 FL (ref 78–100)
MDC_IDC_LEAD_IMPLANT_DT: NORMAL
MDC_IDC_LEAD_LOCATION: NORMAL
MDC_IDC_LEAD_MFG: NORMAL
MDC_IDC_LEAD_MODEL: NORMAL
MDC_IDC_LEAD_POLARITY_TYPE: NORMAL
MDC_IDC_LEAD_SERIAL: NORMAL
MDC_IDC_LEAD_SPECIAL_FUNCTION: NORMAL
MDC_IDC_MSMT_CAP_CHARGE_TYPE: NORMAL
MDC_IDC_PG_IMPLANT_DTM: NORMAL
MDC_IDC_PG_MFG: NORMAL
MDC_IDC_PG_MODEL: NORMAL
MDC_IDC_PG_SERIAL: NORMAL
MDC_IDC_PG_TYPE: NORMAL
MDC_IDC_SESS_CLINIC_NAME: NORMAL
MDC_IDC_SESS_DTM: NORMAL
MDC_IDC_SESS_TYPE: NORMAL
MDC_IDC_SET_ZONE_DETECTION_INTERVAL: 240 MS
MDC_IDC_SET_ZONE_DETECTION_INTERVAL: 285.71 MS
MDC_IDC_SET_ZONE_TYPE: NORMAL
MDC_IDC_SET_ZONE_VENDOR_TYPE: NORMAL
O2/TOTAL GAS SETTING VFR VENT: 0.21 %
O2/TOTAL GAS SETTING VFR VENT: 21 %
OXYHGB MFR BLDV: 29 %
OXYHGB MFR BLDV: 89 %
PCO2 BLDV: 43 MM HG (ref 40–50)
PCO2 BLDV: 55 MM HG (ref 40–50)
PH BLDV: 7.4 PH (ref 7.32–7.43)
PH BLDV: 7.47 PH (ref 7.32–7.43)
PLATELET # BLD AUTO: 274 10E9/L (ref 150–450)
PO2 BLDV: 22 MM HG (ref 25–47)
PO2 BLDV: 59 MM HG (ref 25–47)
POTASSIUM SERPL-SCNC: 3.9 MMOL/L (ref 3.4–5.3)
POTASSIUM SERPL-SCNC: 4.1 MMOL/L (ref 3.4–5.3)
PROT SERPL-MCNC: 5.8 G/DL (ref 6.8–8.8)
RBC # BLD AUTO: 2.76 10E12/L (ref 4.4–5.9)
SODIUM SERPL-SCNC: 123 MMOL/L (ref 133–144)
SODIUM SERPL-SCNC: 125 MMOL/L (ref 133–144)
WBC # BLD AUTO: 6.4 10E9/L (ref 4–11)

## 2021-03-10 PROCEDURE — 99238 HOSP IP/OBS DSCHRG MGMT 30/<: CPT | Mod: GC | Performed by: INTERNAL MEDICINE

## 2021-03-10 PROCEDURE — 80053 COMPREHEN METABOLIC PANEL: CPT | Performed by: INTERNAL MEDICINE

## 2021-03-10 PROCEDURE — 80048 BASIC METABOLIC PNL TOTAL CA: CPT | Performed by: INTERNAL MEDICINE

## 2021-03-10 PROCEDURE — 97530 THERAPEUTIC ACTIVITIES: CPT | Mod: GO | Performed by: OCCUPATIONAL THERAPIST

## 2021-03-10 PROCEDURE — 250N000013 HC RX MED GY IP 250 OP 250 PS 637: Performed by: STUDENT IN AN ORGANIZED HEALTH CARE EDUCATION/TRAINING PROGRAM

## 2021-03-10 PROCEDURE — 36415 COLL VENOUS BLD VENIPUNCTURE: CPT | Performed by: INTERNAL MEDICINE

## 2021-03-10 PROCEDURE — 250N000011 HC RX IP 250 OP 636: Performed by: STUDENT IN AN ORGANIZED HEALTH CARE EDUCATION/TRAINING PROGRAM

## 2021-03-10 PROCEDURE — 250N000013 HC RX MED GY IP 250 OP 250 PS 637: Performed by: INTERNAL MEDICINE

## 2021-03-10 PROCEDURE — 36415 COLL VENOUS BLD VENIPUNCTURE: CPT | Performed by: STUDENT IN AN ORGANIZED HEALTH CARE EDUCATION/TRAINING PROGRAM

## 2021-03-10 PROCEDURE — 250N000013 HC RX MED GY IP 250 OP 250 PS 637

## 2021-03-10 PROCEDURE — 82805 BLOOD GASES W/O2 SATURATION: CPT | Performed by: INTERNAL MEDICINE

## 2021-03-10 PROCEDURE — 85610 PROTHROMBIN TIME: CPT | Performed by: INTERNAL MEDICINE

## 2021-03-10 PROCEDURE — 82805 BLOOD GASES W/O2 SATURATION: CPT | Performed by: STUDENT IN AN ORGANIZED HEALTH CARE EDUCATION/TRAINING PROGRAM

## 2021-03-10 PROCEDURE — 83735 ASSAY OF MAGNESIUM: CPT | Performed by: INTERNAL MEDICINE

## 2021-03-10 PROCEDURE — 85027 COMPLETE CBC AUTOMATED: CPT | Performed by: INTERNAL MEDICINE

## 2021-03-10 PROCEDURE — 97110 THERAPEUTIC EXERCISES: CPT | Mod: GO | Performed by: OCCUPATIONAL THERAPIST

## 2021-03-10 RX ORDER — DOBUTAMINE HYDROCHLORIDE 200 MG/100ML
5 INJECTION INTRAVENOUS CONTINUOUS
Qty: 250 ML | Refills: 1 | Status: SHIPPED | OUTPATIENT
Start: 2021-03-10

## 2021-03-10 RX ORDER — DOBUTAMINE HYDROCHLORIDE 200 MG/100ML
2.5 INJECTION INTRAVENOUS CONTINUOUS
Qty: 250 ML | Refills: 1 | Status: SHIPPED | OUTPATIENT
Start: 2021-03-10 | End: 2021-03-10

## 2021-03-10 RX ORDER — TORSEMIDE 20 MG/1
80 TABLET ORAL
Status: DISCONTINUED | OUTPATIENT
Start: 2021-03-10 | End: 2021-03-10 | Stop reason: HOSPADM

## 2021-03-10 RX ORDER — EPLERENONE 25 MG/1
50 TABLET, FILM COATED ORAL DAILY
Qty: 60 TABLET | Refills: 1 | Status: SHIPPED | OUTPATIENT
Start: 2021-03-10 | End: 2021-04-09

## 2021-03-10 RX ORDER — WARFARIN SODIUM 10 MG/1
10 TABLET ORAL
Status: DISCONTINUED | OUTPATIENT
Start: 2021-03-10 | End: 2021-03-10 | Stop reason: HOSPADM

## 2021-03-10 RX ORDER — WARFARIN SODIUM 10 MG/1
10 TABLET ORAL DAILY
Qty: 30 TABLET | Refills: 0 | Status: SHIPPED | OUTPATIENT
Start: 2021-03-10 | End: 2021-03-12

## 2021-03-10 RX ORDER — POTASSIUM CHLORIDE 750 MG/1
20 TABLET, EXTENDED RELEASE ORAL 2 TIMES DAILY
Qty: 120 TABLET | Refills: 0 | Status: SHIPPED | OUTPATIENT
Start: 2021-03-10 | End: 2021-04-09

## 2021-03-10 RX ORDER — MAGNESIUM OXIDE 400 MG/1
400 TABLET ORAL 2 TIMES DAILY
Status: DISCONTINUED | OUTPATIENT
Start: 2021-03-10 | End: 2021-03-10 | Stop reason: HOSPADM

## 2021-03-10 RX ORDER — TORSEMIDE 20 MG/1
80 TABLET ORAL 2 TIMES DAILY
Qty: 240 TABLET | Refills: 1 | Status: SHIPPED | OUTPATIENT
Start: 2021-03-10 | End: 2021-04-09

## 2021-03-10 RX ORDER — FOLIC ACID 1 MG/1
1 TABLET ORAL DAILY
Qty: 30 TABLET | Refills: 1 | Status: SHIPPED | OUTPATIENT
Start: 2021-03-11 | End: 2021-04-10

## 2021-03-10 RX ADMIN — LACTULOSE 20 G: 20 POWDER, FOR SOLUTION ORAL at 08:22

## 2021-03-10 RX ADMIN — RIFAXIMIN 550 MG: 550 TABLET ORAL at 08:22

## 2021-03-10 RX ADMIN — OXYCODONE HYDROCHLORIDE 5 MG: 5 TABLET ORAL at 06:43

## 2021-03-10 RX ADMIN — ACETAMINOPHEN 650 MG: 325 TABLET, FILM COATED ORAL at 02:28

## 2021-03-10 RX ADMIN — TORSEMIDE 80 MG: 20 TABLET ORAL at 08:32

## 2021-03-10 RX ADMIN — ENOXAPARIN SODIUM 50 MG: 60 INJECTION SUBCUTANEOUS at 14:28

## 2021-03-10 RX ADMIN — ENOXAPARIN SODIUM 50 MG: 60 INJECTION SUBCUTANEOUS at 08:35

## 2021-03-10 RX ADMIN — ACETAMINOPHEN 650 MG: 325 TABLET, FILM COATED ORAL at 06:43

## 2021-03-10 RX ADMIN — SPIRONOLACTONE 50 MG: 25 TABLET, FILM COATED ORAL at 08:22

## 2021-03-10 RX ADMIN — LIDOCAINE 1 PATCH: 560 PATCH PERCUTANEOUS; TOPICAL; TRANSDERMAL at 08:23

## 2021-03-10 RX ADMIN — FOLIC ACID 1 MG: 1 TABLET ORAL at 08:22

## 2021-03-10 RX ADMIN — MULTIPLE VITAMINS W/ MINERALS TAB 1 TABLET: TAB at 08:22

## 2021-03-10 RX ADMIN — THIAMINE HCL TAB 100 MG 100 MG: 100 TAB at 08:22

## 2021-03-10 RX ADMIN — POTASSIUM CHLORIDE 20 MEQ: 1.5 POWDER, FOR SOLUTION ORAL at 08:23

## 2021-03-10 RX ADMIN — Medication 5 ML: at 12:46

## 2021-03-10 RX ADMIN — PANTOPRAZOLE SODIUM 40 MG: 40 TABLET, DELAYED RELEASE ORAL at 08:22

## 2021-03-10 RX ADMIN — OXYCODONE HYDROCHLORIDE 5 MG: 5 TABLET ORAL at 02:28

## 2021-03-10 RX ADMIN — Medication 400 MG: at 08:31

## 2021-03-10 RX ADMIN — NICOTINE 7 MG/24 HR DAILY TRANSDERMAL PATCH 1 PATCH: at 08:22

## 2021-03-10 ASSESSMENT — ACTIVITIES OF DAILY LIVING (ADL)
ADLS_ACUITY_SCORE: 18

## 2021-03-10 ASSESSMENT — MIFFLIN-ST. JEOR: SCORE: 1470.12

## 2021-03-10 NOTE — PROGRESS NOTES
Addendum  1515  Returned to bedside once supplies arrived.  Provided hook up and some preliminary teaching after reviewing pump settings and verifying with orders while Maverick and Tamie observed.   Instructed them to never flush PICC between bag changes.  Reminded them about plan for SNV, supplies and ongoing supply deliveries, storage of medication, back up pump, 24/7 availability of [facility] and [facility] staff and how to contact as needed while on home IV therapy.    Tamie and Will verbalized understanding of information given.   Pt will be seen at home tomorrow for first bag change.  They feel comfortable discharging and managing the IV therapy at home once teaching is completed.    Maverick's home dobutamine infusion is running and he is ready for discharge from [facility] perspective.    Home Infusion  Maverick is discharging today and is going home on continuous IV dobutamine.   He has never done home IV therapy before and will not have the opportunity to attend the [facility] for some initial teaching.  His SO Tamie will be his CG and take primary responsibility for the management of his therapy.  Benefits have been checked and pt will have 100% coverage with his [place] policy.  Met with Hunter at bedside to relay benefits and offer choice of agency.  Will would like to use [facility].  Provided them with information about [facility] services.  Explained about administration method, the pump, medication bag and gemma pack used for mobility.   I informed the that I will assist with hook up of his home medication here at the hospital once drug and supplies arrive and they will have a follow up nurse visit at home the following day to continue teaching the daily bag changes.   Informed them about supplies and delivery of supplies, storage of medication, continuous infusion requirements, plan for SNV and 24/7 availability of I staff while on IV therapy.   Will will receive home nursing through [facility] ([phone]).  uHnter verbalized  understanding of all information given.   They are willing and able to learn and manage home IV therapy once teaching is complete.  Questions answered.  Will return for hook up of home pump once supplies arrive.    Nazia AVILES  [facility] Liaison  [phone] [email or web address]: [phone]

## 2021-03-10 NOTE — DISCHARGE SUMMARY
[facility]    Cardiology Discharge Summary- Cards 2         Date of Admission:  2/26/2021  Date of Discharge:  3/10/2021  3:40 PM  Discharging Provider: Dr. Ayala      Discharge Diagnoses   Dilated NICM (LVEF 15%, LVIDd 7.7 cm) 2/2 to EtOH   Acute on chronic HFrEF, NYHA class IV   Cardiogenic shock   LV apical thrombus  Hyponatremia  Hypokalemia  Cirrhosis  Portal Hypertension  Ascites  Alcohol dependency with hepatitis  Esophagitis  Upper GIB  Encephalopathy  Anemia    Follow-ups Needed After Discharge   Follow-up Appointments     Follow Up and recommended labs and tests      Mr Henson, ana are scheduled for:    [facility] [place] @ [facility]  Address: [address] [place] [address]  Ph: [phone]  Fax: [phone]    Friday, 3/18/21 @ 8am  Dr Lashell Solis    _______________________________________________________________    New warfarin  Indication: Atril fibrillation/LV Thrombus  Goal INR: 2-3  Duration: to be determined    Primary Care MD: Dr Carlton Jacome  Clinic: [facility]  Address: [address] [place] [address]    Ph: [phone] scheduling  INR RN: Please see her after your appopintment with Dr Jacome  Fax: [phone]  Appointment is on: Friday, 3/12/21 @ 2:40pm---please arrive at 2:25pm         Follow Up and recommended labs and tests      Follow up with Yaa Cage at ([place], [place]) on   3/15/21 at 8:45am  for hospital follow- up. The following labs/tests are   recommended: BMP, INR, CBC.    Also f/u with Azar Argueta at [facility], on 3/15/21 at 3:50pm           Unresulted Labs Ordered in the Past 30 Days of this Admission     Date and Time Order Name Status Description    3/8/2021 1620 fluid culture - Aerobic Bacterial Preliminary       These results will be followed up by primary Cardiology     Discharge Disposition   Discharged to home  Condition at discharge: Poor    Hospital  Course   Miguel Ángel Henson is a 35 year old male with a PMHx of NICM (LVEF 15%) 2/ to EtOH abuse, subcutaneous ICD with hx of VT and tobacco abuse who presented to OSH 20 profoundly volume overloaded with acute on chronic HFrEF, cardiogenic shock and AC requiring transfer to [facility] 2021 for consideration of advanced therapies. Liver biopsy showed fibrosis consistent with cardiac cirrhosis which is prohibitive for advanced cardiac therapies. He was started on inotrope therapy and aggressively diuresed with improvement of his cardiogenic shock and he is hemodynamically stable on an oral regimen.  His hospital course was otherwise been complicated by UGIB (resolved) due to esophagitis and encephalopathy (improved). He was subsequently discharged on home inotrope with plan for continuing follow up at his outpatient cardiology and GI clinic in [facility]).        # Dilated NICM (LVEF 15%, LVIDd 7.7 cm) 2/ to EtOH   # Acute on chronic HFrEF, NYHA class IV, s/p ICD placement  # Cardiogenic shock   # Medication/clinic follow-up non compliance   Presented to OSH with marked volume overload and cardiogenic shock requiring multiple pressors. Decompensated HF is in setting of medication non-compliance. On admit to [facility], CI was 2.2 (on dobutamine 3 mcg/kg/min, NE 0.06 and vaso 2.4) with CVP of 8 (small collapsible IVC) after significant volume removal (70 lbs) at OSH. Consideration of advanced therapies limited by biopsy proven cirrhosis and recent/continuing EtOH use. Inotrope therapy initiated and further aggressive diuresis done with decent response. TTE : LVEF 5-10%, LVIDd 8.97 cm, LV apical thrombus, mod-sev MR, mod-sev TR Mild RV dysfunction,RHC : RA 6, RV 31/6, PA 31/16 (22), PCWP 14, Luis 5.2/3.2, TD 5.4/3.3, SVR 1060, PVR 1.5 on dobutamine 3, vaso 2.4, norepi 0.02. Reading Numbers : CVP 3, PA 28/17 (24), PCWP 12, SvO2 60, CI 2.05, SVR 1634 on vaso 1, norepi 0.06,  2. Reading numbers  2/28 CVP 13, PA 36/24, PCWP 16, SvO2 43, CI/CO 2.1/3.6, SVR 1622. Overall picture concerning for poor prognosis and this concern was relayed to patient and spouse on multiple discussions including on day of discharge. Discharged on oral diuretics and ionotrope (Dobutamine) therapy.   - Torsemide 80mg bid  - Spironolactone 50mg every day  - Follow up with Cardiology at [facility] as above.      # LV apical thrombus  - Warfarin with goal INR: 2-3, to be seen at [facility], anticoagulation clinic on 3/12/21     # Hyponatremia, 2/2 acute on chronic volume overload  - Sodium range of 127-123 during this admission  - BMP check on cardiology clinic follow up     # Diuretic induced hypokalemia  - KCl replacement prescribed on discharge as below  - BMP check on cardiology clinic follow up    # Cardiac Cirrhosis  # Portal hypertension   # Ascites s/p multiple paracentesis   # Hx alcohol abuse with alcoholic pancreatis/hepatitis    Biopsy with severe fibrosis/cardiac cirrhosis. Per hepatology: No way to definitively state his liver will have improvement after heart transplant or the impact of recovery/progression with ongoing alcohol use. Last therapeutic paracentesis was 3/8 with 4.5L removed.  - GI follow up at [facility] on 3/15/21      # Upper GIB - resolved  Coffee ground emesis on 3/4/21. Hb 11 > 8 and transfused x1 to keep hgb >8. S/p EGD which showed esophagitis, friable mucosa. No evidence of varices. Completed 72hrs ceftriaxone for SBP prophylaxis, Hgb was stable above 8 for >3 days prior to discharge.   - continue pantoprazole 40mg bid oral  - Outpatient GI follow up as above with hgb check.     # Encephalopathy 2/2 decompensated Cirrhosis  Patient had gradual worsening of mentation a/w increased somnolence and asterixis during admission requiring aggressive lactulose therapy for days before showing signs of improvement.   He was alert and oreinted on day of discharge  - Continue Lactulose daily to  target 3 BM per day  - GI follow up as above     FEN: 2g Na, 1800cc fluid restricted   DVT Prophylaxis: warfarin  Code Status: Full          Discharge plan discussed with attending physician Dr. Ayala       Consultations This Hospital Stay   WOUND OSTOMY CONTINENCE NURSE  IP CONSULT  NUTRITION SERVICES ADULT IP CONSULT  PHARMACY IP CONSULT  PHARMACY IP CONSULT  NEUROPSYCHOLOGY IP CONSULT  INTERNAL MEDICINE PROCEDURE TEAM ADULT IP CONSULT [facility]  GI HEPATOLOGY ADULT IP CONSULT  INTERVENTIONAL RADIOLOGY ADULT/PEDS IP CONSULT  MEDICATION HISTORY IP PHARMACY CONSULT  GI LUMINAL ADULT IP CONSULT  PHYSICAL THERAPY ADULT IP CONSULT  OCCUPATIONAL THERAPY ADULT IP CONSULT  PHARMACY TO DOSE WARFARIN  PHARMACY IP CONSULT  PHARMACY IP CONSULT  INTERNAL MEDICINE PROCEDURE TEAM ADULT IP CONSULT [facility]  PATIENT LEARNING CENTER IP CONSULT  PHARMACY IP CONSULT  PHARMACY IP CONSULT  CARE MANAGEMENT / SOCIAL WORK IP CONSULT  INTERNAL MEDICINE PROCEDURE TEAM ADULT IP CONSULT [facility]  VASCULAR ACCESS FOR PICC PLACEMENT ADULT IP CONSULT  VASCULAR ACCESS CARE ADULT IP CONSULT  VASCULAR ACCESS CARE ADULT IP CONSULT  VASCULAR ACCESS FOR PICC PLACEMENT ADULT IP CONSULT    Code Status   Full Code        Onyinyechukwu Bette Gil MD  [facility]  ______________________________________________________________________    Physical Exam   Vital Signs: Temp: 98.3  F (36.8  C) Temp src: Oral BP: 101/82 Pulse: 114   Resp: 16 SpO2: 100 % O2 Device: None (Room air)    Weight: 118 lbs 12.8 oz  Constitutional: awake, alert, no apparent distress, cachectic in appearance  Eyes: Nonicteric, pupils equal  HENT: NCAT, temporal wasting  Respiratory: Nonlabored,  Decreased air movement over the b/l bases, no crackles or wheezes  Cardiovascular: RRR,  III/VI Holosystolic blowing murmur best appreciated over apex, parasternal heave, laterally displaced PMI; JVP  at clavicle  GI: Distended abdomen, nonrigid, NT. + fluid wave  Skin: warm and dry, no rash, feet with bandages in place  Musculoskeletal: thin extremities, wwp, no peripheral edema  Neurologic: AAOx3, CN grossly intact, spontaneous movement of all extremities            Primary Care Physician   [facility]    Discharge Orders      Home infusion referral      Follow Up and recommended labs and tests    Mr Henson, ana are scheduled for:    [facility] @ [facility]  Address: [address] [place] [address]  Ph: [phone]  Fax: [phone]    Friday, 3/18/21 @ 8am  Dr Lashell Solis    _______________________________________________________________    New warfarin  Indication: Atril fibrillation/LV Thrombus  Goal INR: 2-3  Duration: to be determined    Primary Care MD: Dr Carlton Jacome  Clinic: [facility]  Address: [address] [place] [address]    Ph: [phone] scheduling  INR RN: Please see her after your appopintment with Dr Jacome  Fax: [phone]  Appointment is on: Friday, 3/12/21 @ 2:40pm---please arrive at 2:25pm     Reason for your hospital stay    You were admitted with severe heart failure and cardiogenic shock.     Follow Up and recommended labs and tests    Follow up with Yaa Cage at ([place], [place]) on 3/15/21 at 8:45am  for hospital follow- up. The following labs/tests are recommended: BMP, INR, CBC.    Also f/u with Azar Argueta at [facility], on 3/15/21 at 3:50pm     Activity    Your activity upon discharge: activity as tolerated     Diet    Follow this diet upon discharge: Orders Placed This Encounter      Fluid restriction 1800 ML FLUID      Snacks/Supplements Adult: Other; Strawberry Magic Cup @ 10; Chocolate Magic Cup + string cheese @ HS daily; Between Meals      2 Gram Sodium Diet       Significant Results and Procedures   Most Recent 3 CBC's:  Recent Labs   Lab Test 03/10/21  0536 03/09/21  0532  03/07/21  0516   WBC 6.4 6.3 6.1   HGB 8.9* 9.8* 9.5*   MCV 97 95 97    317 322     Most Recent 3 BMP's:  Recent Labs   Lab Test 03/10/21  1440 03/10/21  0536 03/09/21  1452   * 125* 127*   POTASSIUM 4.1 3.9 4.7   CHLORIDE 86* 88* 88*   CO2 32 30 33*   BUN 19 22 19   CR 0.90 0.86 0.99   ANIONGAP 6 6 6   ALEKSEY 9.0 8.5 9.1   * 100* 100*     Most Recent 2 LFT's:  Recent Labs   Lab Test 03/10/21  0536 03/09/21  0532   AST 28 19   ALT 24 25   ALKPHOS 138 158*   BILITOTAL 0.7 0.9     Most Recent 3 INR's:  Recent Labs   Lab Test 03/10/21  0536 03/09/21  0532 03/08/21  0443   INR 1.89* 1.77* 1.61*       Discharge Medications   Current Discharge Medication List      START taking these medications    Details   DOBUTamine 500 mg in dextrose 5% 250 mL (adult std conc) premix Inject 257.5 mcg/min into the vein continuous  Qty: 250 mL, Refills: 1    Associated Diagnoses: Cardiogenic shock (H)      folic acid (FOLVITE) 1 MG tablet Take 1 tablet (1 mg) by mouth daily  Qty: 30 tablet, Refills: 1    Associated Diagnoses: Cardiogenic shock (H)      lactulose (CEPHULAC) 20 GM packet Take 1 packet (20 g) by mouth 4 times daily Hold if more than 3 BM in one day  Qty: 120 packet, Refills: 3    Associated Diagnoses: Cardiogenic shock (H)      rifaximin (XIFAXAN) 550 MG TABS tablet Take 1 tablet (550 mg) by mouth 2 times daily  Qty: 60 tablet, Refills: 0    Associated Diagnoses: Cardiogenic shock (H)      warfarin ANTICOAGULANT (COUMADIN) 10 MG tablet Take 1 tablet (10 mg) by mouth daily for 2 days Take 2 tablets by mouth on 3/10/21 and 3/11/21. The will tell you what dose to take in clinic on 3/12/21  Qty: 30 tablet, Refills: 0    Associated Diagnoses: LV (left ventricular) mural thrombus         CONTINUE these medications which have CHANGED    Details   eplerenone (INSPRA) 25 MG tablet Take 2 tablets (50 mg) by mouth daily  Qty: 60 tablet, Refills: 1    Associated Diagnoses: Cardiogenic shock (H)      potassium chloride  ER (KLOR-CON M) 10 MEQ CR tablet Take 2 tablets (20 mEq) by mouth 2 times daily  Qty: 120 tablet, Refills: 0    Associated Diagnoses: Cardiogenic shock (H)      torsemide (DEMADEX) 20 MG tablet Take 4 tablets (80 mg) by mouth 2 times daily  Qty: 240 tablet, Refills: 1    Associated Diagnoses: Cardiogenic shock (H)         CONTINUE these medications which have NOT CHANGED    Details   magnesium oxide (MAG-OX) 400 MG tablet Take 800 mg by mouth daily      multivitamin w/minerals (MULTI-VITAMIN) tablet Take 1 tablet by mouth daily      omeprazole (PRILOSEC) 40 MG DR capsule Take 40 mg by mouth daily         STOP taking these medications       losartan (COZAAR) 50 MG tablet Comments:   Reason for Stopping:         metoprolol succinate ER (TOPROL-XL) 25 MG 24 hr tablet Comments:   Reason for Stopping:         sildenafil (VIAGRA) 100 MG tablet Comments:   Reason for Stopping:             Allergies   No Known Allergies

## 2021-03-10 NOTE — PLAN OF CARE
D: Transferred from OSH 2/26 for advanced therapies after being admitted with HF (EF 15%), cardiogenic shock, and AC. Liver biopsy revealing cardiac cirrhosis. Course c/b UGIB d/t esophagitis.   Hx of NICM 2/2 EtOH abuse, VT s/p ICD, tobacco use    I: Monitored vitals and assessed pt status.   Changed:  Running: SL PICC w/dobutamine gtt @ 2.5mcq/kg/min, PIV x2 SL'd  PRN: Oxycodone and tylenol @ 0230    A: A0x4. VSS, on RA. Monitor SR/ST . Pain controlled with tylenol and oxy and Lido patches.Continues to have loose BMs (2/2 lactulose). Good UO. On 1.8L FR. Slept off and on     I/O this shift:  In: 320 [P.O.:320]  Out: 600 [Urine:600]    Temp:  [97.5  F (36.4  C)-98.3  F (36.8  C)] 97.6  F (36.4  C)  Pulse:  [] 98  Resp:  [16-18] 16  BP: ()/(58-83) 99/68  SpO2:  [95 %-100 %] 100 %      P: Continue to monitor Pt status and report changes to treatment team. Discharge to home with IV dobutamine when medically ready and teaching for dobutamine and PICC has been completed

## 2021-03-10 NOTE — PROGRESS NOTES
HX:35 year old male with a PMHx of NICM (LVEF 15%) 2/2 to EtOH abuse, subcutaneous ICD with hx of VT and tobacco abuse who presented to OSH 2/17/20 profoundly volume overloaded with acute on chronic HFrEF, cardiogenic shock and AC requiring transfer to [facility] 2/26/2021 for consideration of advanced therapies.     Cardiac:ST 100s  VS:VSS  IV:SL PICC with dobutamine at 2.5 mcg/kg/min, PIV-SL  Tubes:NA  Neuro:A/Ox4, see flowsheets for neuro assessments, appropriate verbalizations, talkative, no hypermania noted  Resp:denies shortness of breath  GI/:voiding, several large diarrhea BMs d/t lactulose  Endo:NA  Skin:mepilex on coccyx, open wound on top of right foot, dressed with medihoney and mepilex  Pain:c/o generalized and back pain, treated with lidocaine patch, tylenol and oxycodone  Social:no visitors present, cell phone at bedside  Plan:discharge to home with IV dobutamine and warfarin

## 2021-03-10 NOTE — PROGRESS NOTES
-Pt received orders that were carried out to discharge to home.  -Pt accompanied home by his wife.  -Pt and his wife reviewed and understood his discharge instructions, orders, follow up appointments and prescriptions.  -Pt and his wife had education with the home infusion service over home dobutamine use. Pt attached to the home infusion prior to discharge.   -Pt and wife consulted with the attending physician prior to leaving.  -Pt provided with wound care supplies.   -Pt's belongings returned to him from the safe.  -Pt received prescriptions from the discharge pharmacy.

## 2021-03-11 NOTE — PLAN OF CARE
Occupational Therapy Discharge Summary     Reason for therapy discharge:    Discharged to home w/ OP CR.     Progress towards therapy goal(s). See goals on Care Plan in [facility] electronic health record for goal details.  Goals partially met.  Barriers to achieving goals:   discharge from facility.     Therapy recommendation(s):    Continued therapy is recommended.  Rationale/Recommendations:  Continued OT at OP CR to increase ind in ADLS/IADLS and work towards unmet goals.

## 2021-03-11 NOTE — TELEPHONE ENCOUNTER
Prior Authorization Approval    xifaxan 550mg tabs  Date Initiated: 3/10/2021  Date Completed: 3/11/2021  Prior Auth Type: Clinical                Status: Approved    Effective Date: 03/11/2021 - 03/05/2022  Copay: 0.00     Filling Pharmacy: [facility] - [place], [place] - [address] [place] [address]    Insurance: [place] Medicaid ([facility]) - Phone [phone] Fax [phone]  ID: [id number]  Case Number: [id number]   Submitted Via: Fax    Mable Molina  [facility] Liaison  Ph: [phone] Pager: [phone]

## 2021-03-11 NOTE — PROGRESS NOTES
This is a recent snapshot of the patient's [facility] medical record.  For current drug dose and complete information and questions, call [phone] or In Basket pool, fv home infusion ([id number])  CSN Number:  [id number]

## 2021-03-12 ENCOUNTER — HOME INFUSION (PRE-WILLOW HOME INFUSION) (OUTPATIENT)
Dept: PHARMACY | Facility: CLINIC | Age: 36
End: 2021-03-12

## 2021-03-12 NOTE — PROGRESS NOTES
Attempted to contact the patient x3 for post-hospital call without answer. Will close encounter at this time.    Becky Tejada CMA, [facility]  Post Hospital Discharge Team

## 2021-03-13 LAB
BACTERIA SPEC CULT: NO GROWTH
SPECIMEN SOURCE: NORMAL

## 2021-03-15 ENCOUNTER — HOME INFUSION (PRE-WILLOW HOME INFUSION) (OUTPATIENT)
Dept: PHARMACY | Facility: CLINIC | Age: 36
End: 2021-03-15

## 2021-03-15 NOTE — PROGRESS NOTES
This is a recent snapshot of the patient's [facility] medical record.  For current drug dose and complete information and questions, call [phone] or In Basket pool, fv home infusion ([id number])  CSN Number:  [id number]

## 2021-03-16 ENCOUNTER — HOME INFUSION (PRE-WILLOW HOME INFUSION) (OUTPATIENT)
Dept: PHARMACY | Facility: CLINIC | Age: 36
End: 2021-03-16

## 2021-03-17 NOTE — PROGRESS NOTES
This is a recent snapshot of the patient's [facility] medical record.  For current drug dose and complete information and questions, call [phone] or In Basket pool, fv home infusion ([id number])  CSN Number:  [id number]

## 2021-04-02 NOTE — PROGRESS NOTES
This is a recent snapshot of the patient's [facility] medical record.  For current drug dose and complete information and questions, call [phone] or In Basket pool, fv home infusion ([id number])  CSN Number:  [id number]

## 2021-10-24 NOTE — DISCHARGE INSTRUCTIONS
________________________________________________________  Discharge RN please fax discharge orders to:    Primary Care MD: Dr Carlton Jacome   Fax: [phone]  And anticoagulation dosing sheet/discharge summary    And to:  [facility] @ [facility]  Address: [facility] [address] [place] [address]  Ph: [phone]  Fax: [phone]  And anticoagulation dosing sheet/discharge summary    ________________________________________________________  
Statement Selected

## (undated) DEVICE — Device

## (undated) DEVICE — PACK HEART RIGHT CUSTOM SAN32RHF18

## (undated) DEVICE — KIT RIGHT HEART CATH 60130719

## (undated) DEVICE — SLEEVE REPOSITIONING W/CATH LOCK 60CM 406503

## (undated) DEVICE — INTRO SHEATH 7FRX10CM PINNACLE RSS702

## (undated) DEVICE — INTRODUCER SHEATH FAST-CATH CATH-LOCK 7FRX12CM 406702

## (undated) RX ORDER — HEPARIN SODIUM 200 [USP'U]/100ML
INJECTION, SOLUTION INTRAVENOUS
Status: DISPENSED
Start: 2021-03-01

## (undated) RX ORDER — LIDOCAINE HYDROCHLORIDE 10 MG/ML
INJECTION, SOLUTION EPIDURAL; INFILTRATION; INTRACAUDAL; PERINEURAL
Status: DISPENSED
Start: 2021-03-01

## (undated) RX ORDER — FENTANYL CITRATE 50 UG/ML
INJECTION, SOLUTION INTRAMUSCULAR; INTRAVENOUS
Status: DISPENSED
Start: 2021-03-01